# Patient Record
Sex: FEMALE | Race: BLACK OR AFRICAN AMERICAN | Employment: FULL TIME | URBAN - METROPOLITAN AREA
[De-identification: names, ages, dates, MRNs, and addresses within clinical notes are randomized per-mention and may not be internally consistent; named-entity substitution may affect disease eponyms.]

---

## 2017-03-13 DIAGNOSIS — I10 ESSENTIAL HYPERTENSION, BENIGN: ICD-10-CM

## 2017-03-13 RX ORDER — LISINOPRIL AND HYDROCHLOROTHIAZIDE 20; 25 MG/1; MG/1
1 TABLET ORAL DAILY
Qty: 90 TAB | Refills: 0 | Status: SHIPPED | OUTPATIENT
Start: 2017-03-13 | End: 2017-09-19 | Stop reason: SDUPTHER

## 2017-09-19 DIAGNOSIS — I10 ESSENTIAL HYPERTENSION, BENIGN: ICD-10-CM

## 2017-09-19 RX ORDER — LISINOPRIL AND HYDROCHLOROTHIAZIDE 20; 25 MG/1; MG/1
1 TABLET ORAL DAILY
Qty: 30 TAB | Refills: 2 | Status: SHIPPED | OUTPATIENT
Start: 2017-09-19 | End: 2018-10-19

## 2017-10-15 ENCOUNTER — HOSPITAL ENCOUNTER (EMERGENCY)
Age: 32
Discharge: HOME | End: 2017-10-15
Attending: EMERGENCY MEDICINE | Admitting: EMERGENCY MEDICINE
Payer: SUBSIDIZED

## 2017-10-15 VITALS
OXYGEN SATURATION: 100 % | HEART RATE: 90 BPM | HEIGHT: 66 IN | SYSTOLIC BLOOD PRESSURE: 133 MMHG | WEIGHT: 200 LBS | RESPIRATION RATE: 18 BRPM | BODY MASS INDEX: 32.14 KG/M2 | TEMPERATURE: 100.9 F | DIASTOLIC BLOOD PRESSURE: 104 MMHG

## 2017-10-15 DIAGNOSIS — H65.193 OTHER ACUTE NONSUPPURATIVE OTITIS MEDIA OF BOTH EARS, RECURRENCE NOT SPECIFIED: Primary | ICD-10-CM

## 2017-10-15 DIAGNOSIS — H60.503 ACUTE OTITIS EXTERNA OF BOTH EARS, UNSPECIFIED TYPE: ICD-10-CM

## 2017-10-15 PROCEDURE — 74011250637 HC RX REV CODE- 250/637: Performed by: PHYSICIAN ASSISTANT

## 2017-10-15 PROCEDURE — 99283 EMERGENCY DEPT VISIT LOW MDM: CPT

## 2017-10-15 RX ORDER — LORATADINE 10 MG/1
10 TABLET ORAL
Status: COMPLETED | OUTPATIENT
Start: 2017-10-15 | End: 2017-10-15

## 2017-10-15 RX ORDER — IBUPROFEN 400 MG/1
800 TABLET ORAL
Status: COMPLETED | OUTPATIENT
Start: 2017-10-15 | End: 2017-10-15

## 2017-10-15 RX ORDER — LORATADINE 10 MG/1
10 TABLET ORAL DAILY
Qty: 20 TAB | Refills: 0 | Status: SHIPPED | OUTPATIENT
Start: 2017-10-15 | End: 2018-03-21

## 2017-10-15 RX ORDER — AMOXICILLIN 875 MG/1
875 TABLET, FILM COATED ORAL 2 TIMES DAILY
Qty: 20 TAB | Refills: 0 | Status: SHIPPED | OUTPATIENT
Start: 2017-10-15 | End: 2017-10-25

## 2017-10-15 RX ORDER — IBUPROFEN 800 MG/1
800 TABLET ORAL
Qty: 20 TAB | Refills: 0 | Status: SHIPPED | OUTPATIENT
Start: 2017-10-15 | End: 2017-10-15

## 2017-10-15 RX ORDER — NEOMYCIN SULFATE, POLYMYXIN B SULFATE, HYDROCORTISONE 3.5; 10000; 1 MG/ML; [USP'U]/ML; MG/ML
5 SOLUTION/ DROPS AURICULAR (OTIC) 4 TIMES DAILY
Qty: 10 ML | Refills: 0 | Status: SHIPPED | OUTPATIENT
Start: 2017-10-15 | End: 2017-10-15

## 2017-10-15 RX ORDER — IBUPROFEN 800 MG/1
800 TABLET ORAL
Qty: 20 TAB | Refills: 0 | Status: SHIPPED | OUTPATIENT
Start: 2017-10-15 | End: 2017-10-22

## 2017-10-15 RX ORDER — AMOXICILLIN 875 MG/1
875 TABLET, FILM COATED ORAL 2 TIMES DAILY
Qty: 20 TAB | Refills: 0 | Status: SHIPPED | OUTPATIENT
Start: 2017-10-15 | End: 2017-10-15

## 2017-10-15 RX ORDER — LORATADINE 10 MG/1
10 TABLET ORAL DAILY
Qty: 20 TAB | Refills: 0 | Status: SHIPPED | OUTPATIENT
Start: 2017-10-15 | End: 2017-10-15

## 2017-10-15 RX ORDER — NEOMYCIN SULFATE, POLYMYXIN B SULFATE, HYDROCORTISONE 3.5; 10000; 1 MG/ML; [USP'U]/ML; MG/ML
5 SOLUTION/ DROPS AURICULAR (OTIC) 4 TIMES DAILY
Qty: 10 ML | Refills: 0 | Status: SHIPPED | OUTPATIENT
Start: 2017-10-15 | End: 2018-03-21

## 2017-10-15 RX ADMIN — IBUPROFEN 800 MG: 400 TABLET, FILM COATED ORAL at 21:48

## 2017-10-15 RX ADMIN — LORATADINE 10 MG: 10 TABLET ORAL at 21:48

## 2017-10-16 NOTE — ED PROVIDER NOTES
Patient is a 36 y.o. female presenting with ear pain. The history is provided by the patient. Ear Pain    This is a new problem. The current episode started 12 to 24 hours ago. The problem occurs constantly. The problem has not changed since onset. Patient complains that both ears are affected. There has been a fever of 100 - 100.9 F. The fever has been present for less than 1 day. The pain is at a severity of 10/10. The pain is moderate. Associated symptoms include headaches. Pertinent negatives include no ear discharge, no hearing loss, no rhinorrhea, no sore throat, no abdominal pain, no diarrhea, no vomiting, no neck pain, no cough and no rash. Risk factors: [place]. Her past medical history does not include chronic ear infection, hearing loss or tympanostomy tube. Past Medical History:   Diagnosis Date    Headache     Hypertension        Past Surgical History:   Procedure Laterality Date    HX GYN      ectopic pregnancy - lapartomy         Family History:   Problem Relation Age of Onset    Hypertension Mother     Migraines Mother     Cancer Maternal Grandmother      brain tumor       Social History     Social History    Marital status: UNKNOWN     Spouse name: N/A    Number of children: N/A    Years of education: N/A     Occupational History    Not on file. Social History Main Topics    Smoking status: Current Every Day Smoker     Packs/day: 0.25    Smokeless tobacco: Never Used    Alcohol use Yes      Comment: occassionally    Drug use: No    Sexual activity: Yes     Partners: Male     Birth control/ protection: Pill     Other Topics Concern    Not on file     Social History Narrative         ALLERGIES: Nuts [tree nut]    Review of Systems   Constitutional: Negative for activity change, chills, fatigue and fever. HENT: Positive for ear pain.  Negative for congestion, dental problem, ear discharge, facial swelling, hearing loss, mouth sores, postnasal drip, rhinorrhea, sinus pressure, sore throat, tinnitus and trouble swallowing. Eyes: Negative for pain, discharge and visual disturbance. Respiratory: Negative for apnea, cough and shortness of breath. Cardiovascular: Negative for chest pain. Gastrointestinal: Negative for abdominal pain, diarrhea, nausea and vomiting. Genitourinary: Negative. Negative for dysuria and frequency. Musculoskeletal: Negative for arthralgias and neck pain. Skin: Negative. Negative for rash. Neurological: Positive for headaches. Negative for seizures, facial asymmetry, speech difficulty, weakness, light-headedness and numbness. Psychiatric/Behavioral: Negative. Vitals:    10/15/17 2122   BP: (!) 133/104   Pulse: 90   Resp: 18   Temp: (!) 100.9 °F (38.3 °C)   SpO2: 100%   Weight: 90.7 kg (200 lb)   Height: 5' 6\" (1.676 m)            Physical Exam   Constitutional: She is oriented to person, place, and time. She appears well-developed and well-nourished. No distress. HENT:   Head: Normocephalic and atraumatic. Right Ear: Hearing normal. There is swelling and tenderness. No drainage. No foreign bodies. No mastoid tenderness. Tympanic membrane is injected, erythematous and bulging. No middle ear effusion. No decreased hearing is noted. Left Ear: Hearing normal. There is swelling and tenderness. No drainage. No foreign bodies. No mastoid tenderness. Tympanic membrane is injected, erythematous and bulging. No middle ear effusion. No decreased hearing is noted. Nose: Nose normal. No mucosal edema or rhinorrhea. Right sinus exhibits no maxillary sinus tenderness and no frontal sinus tenderness. Left sinus exhibits no maxillary sinus tenderness and no frontal sinus tenderness. Mouth/Throat: Uvula is midline, oropharynx is clear and moist and mucous membranes are normal. No oropharyngeal exudate, posterior oropharyngeal edema, posterior oropharyngeal erythema or tonsillar abscesses.    Eyes: Conjunctivae and EOM are normal. Pupils are equal, round, and reactive to light. Neck: Normal range of motion. Neck supple. Cardiovascular: Normal rate, regular rhythm, normal heart sounds and intact distal pulses. Pulmonary/Chest: Effort normal and breath sounds normal.   Abdominal: Soft. Bowel sounds are normal.   Musculoskeletal: Normal range of motion. Neurological: She is alert and oriented to person, place, and time. No cranial nerve deficit. Skin: Skin is warm and dry. She is not diaphoretic. Psychiatric: She has a normal mood and affect. Her behavior is normal. Judgment and thought content normal.   Nursing note and vitals reviewed. MDM  Number of Diagnoses or Management Options  Acute otitis externa of both ears, unspecified type: Other acute nonsuppurative otitis media of both ears, recurrence not specified:   Diagnosis management comments: DDx: oe, aom, serous otitis media, uri, pharyngitis, mastoiditis    LABORATORY TESTS:  No results found for this or any previous visit (from the past 12 hour(s)). IMAGING RESULTS:  No orders to display    MEDICATIONS GIVEN:  Medications  ibuprofen (MOTRIN) tablet 800 mg (not administered)  loratadine (CLARITIN) tablet 10 mg (not administered)    IMPRESSION:  Other acute nonsuppurative otitis media of both ears, recurrence not specified  (primary encounter diagnosis)  Acute otitis externa of both ears, unspecified type    PLAN:  1. Current Discharge Medication List    START taking these medications    loratadine (CLARITIN) 10 mg tablet  Take 1 Tab by mouth daily. Qty: 20 Tab Refills: 0    ibuprofen (MOTRIN) 800 mg tablet  Take 1 Tab by mouth every six (6) hours as needed for Pain for up to 7 days. Qty: 20 Tab Refills: 0    amoxicillin (AMOXIL) 875 mg tablet  Take 1 Tab by mouth two (2) times a day for 10 days. Qty: 20 Tab Refills: 0    neomycin-polymyxin-hydrocortisone (CORTISPORIN) otic solution  Administer 5 Drops into each ear four (4) times daily.   Qty: 10 mL Refills: 0      CONTINUE these medications which have NOT CHANGED    lisinopril-hydroCHLOROthiazide (PRINZIDE, ZESTORETIC) 20-25 mg per tablet  Take 1 Tab by mouth daily. Qty: 30 Tab Refills: 2  Associated Diagnoses:Essential hypertension, benign    ascorbic acid (VITAMIN C) 500 mg tablet  Take  by mouth. cholecalciferol (VITAMIN D3) 1,000 unit cap  Take  by mouth daily. 2. Follow-up Information     Follow up With Details Comments [address], MD Schedule an appointment as soon as possible for a   visit in 1 week As needed, If symptoms worsen [address] [place] [address]   [address] [place] [address]  [phone]        Return to ED if worse                  Amount and/or Complexity of Data Reviewed  Tests in the medicine section of CPT®: ordered and reviewed    Patient Progress  Patient progress: stable    ED Course       Procedures    9:38 PM  Pt denies taking her daily HTN medication today. Educated on taking as Rx.     9:39 PM  I have discussed with patient their diagnosis, treatment, and follow up plan. The patient agrees to follow up as outlined in discharge paperwork and also to return to the ED with any worsening.  Dominga Barnes PA-C

## 2017-10-16 NOTE — ED NOTES
Discharge instructions were given to the patient by John Paul Lake RN. The patient left the Emergency Department ambulatory, alert and oriented and in no acute distress with 4 prescriptions. The patient was encouraged to call or return to the ED for worsening issues or problems and was encouraged to schedule a follow up appointment for continuing care. The patient verbalized understanding of discharge instructions and prescriptions, all questions were answered. The patient has no further concerns at this time.

## 2017-10-16 NOTE — ED NOTES
Pt presents ambulatory to ED complaining of ear pain and fever X1 day. Pt reports taking Tylenol at 1900 with no relief Pt is alert and oriented x 4, RR even and unlabored, skin is warm and dry. Assesment completed and pt updated on plan of care. Emergency Department Nursing Plan of Care       The Nursing Plan of Care is developed from the Nursing assessment and Emergency Department Attending provider initial evaluation. The plan of care may be reviewed in the ED Provider note.     The Plan of Care was developed with the following considerations:   Patient / Family readiness to learn indicated by:verbalized understanding  Persons(s) to be included in education: patient  Barriers to Learning/Limitations:No    Signed     Yris Infante RN    10/15/2017   9:52 PM

## 2017-10-16 NOTE — DISCHARGE INSTRUCTIONS
Swimmer's Ear: Care Instructions  Your Care Instructions    Swimmer's ear (otitis externa) is inflammation or infection of the ear canal. This is the passage that leads from the outer ear to the eardrum. Any water, sand, or other debris that gets into the ear canal and stays there can cause swimmer's ear. Putting cotton swabs or other items in the ear to clean it can also cause this problem. Swimmer's ear can be very painful. But you can treat the pain and infection with medicines. You should feel better in a few days. Follow-up care is a key part of your treatment and safety. Be sure to make and go to all appointments, and call your doctor if you are having problems. It's also a good idea to know your test results and keep a list of the medicines you take. How can you care for yourself at home? Cleaning and care  · Use antibiotic drops as your doctor directs. · Do not insert ear drops (other than the antibiotic ear drops) or anything else into the ear unless your doctor has told you to. · Avoid getting water in the ear until the problem clears up. Use cotton lightly coated with petroleum jelly as an earplug. Do not use plastic earplugs. · Use a hair dryer set on low to carefully dry the ear after you shower. · To ease ear pain, hold a warm washcloth against your ear. · Take pain medicines exactly as directed. ¨ If the doctor gave you a prescription medicine for pain, take it as prescribed. ¨ If you are not taking a prescription pain medicine, ask your doctor if you can take an over-the-counter medicine. Inserting ear drops  · Warm the drops to body temperature by rolling the container in your hands. Or you can place it in a cup of warm water for a few minutes. · Lie down, with your ear facing up. · Place drops inside the ear. Follow your doctor's instructions (or the directions on the label) for how many drops to use.  Gently wiggle the outer ear or pull the ear up and back to help the drops get into the ear. · It's important to keep the liquid in the ear canal for 3 to 5 minutes. When should you call for help? Call your doctor now or seek immediate medical care if:  · You have a new or higher fever. · You have new or worse pain, swelling, warmth, or redness around or behind your ear. · You have new or increasing pus or blood draining from your ear. Watch closely for changes in your health, and be sure to contact your doctor if:  · You are not getting better after 2 days (48 hours). Where can you learn more? Go to http://osmin-juancarlos.info/. Enter C706 in the search box to learn more about \"Swimmer's Ear: Care Instructions. \"  Current as of: July 29, 2016  Content Version: 11.3  © 8945-5236 [employer]. Care instructions adapted under license by [employer] (which disclaims liability or warranty for this information). If you have questions about a medical condition or this instruction, always ask your healthcare professional. [place] [address] any warranty or liability for your use of this information. Ear Infection (Otitis Media): Care Instructions  Your Care Instructions    An ear infection may start with a cold and affect the middle ear (otitis media). It can hurt a lot. Most ear infections clear up on their own in a couple of days. Most often you will not need antibiotics. This is because many ear infections are caused by a virus. Antibiotics don't work against a virus. Regular doses of pain medicines are the best way to reduce your fever and help you feel better. Follow-up care is a key part of your treatment and safety. Be sure to make and go to all appointments, and call your doctor if you are having problems. It's also a good idea to know your test results and keep a list of the medicines you take. How can you care for yourself at home? · Take pain medicines exactly as directed.   ¨ If the doctor gave you a prescription medicine for pain, take it as prescribed. ¨ If you are not taking a prescription pain medicine, take an over-the-counter medicine, such as acetaminophen (Tylenol), ibuprofen (Advil, Motrin), or naproxen (Aleve). Read and follow all instructions on the label. ¨ Do not take two or more pain medicines at the same time unless the doctor told you to. Many pain medicines have acetaminophen, which is Tylenol. Too much acetaminophen (Tylenol) can be harmful. · Plan to take a full dose of pain reliever before bedtime. Getting enough sleep will help you get better. · Try a warm, moist washcloth on the ear. It may help relieve pain. · If your doctor prescribed antibiotics, take them as directed. Do not stop taking them just because you feel better. You need to take the full course of antibiotics. When should you call for help? Call your doctor now or seek immediate medical care if:  · You have new or increasing ear pain. · You have new or increasing pus or blood draining from your ear. · You have a fever with a stiff neck or a severe headache. Watch closely for changes in your health, and be sure to contact your doctor if:  · You have new or worse symptoms. · You are not getting better after taking an antibiotic for 2 days. Where can you learn more? Go to http://osmin-juancarlos.info/. Enter X885 in the search box to learn more about \"Ear Infection (Otitis Media): Care Instructions. \"  Current as of: May 4, 2017  Content Version: 11.3  © 5905-5012 [employer]. Care instructions adapted under license by [employer] (which disclaims liability or warranty for this information). If you have questions about a medical condition or this instruction, always ask your healthcare professional. [address] any warranty or liability for your use of this information.

## 2018-02-12 ENCOUNTER — TELEPHONE (OUTPATIENT)
Dept: OBGYN CLINIC | Age: 33
End: 2018-02-12

## 2018-02-12 ENCOUNTER — OFFICE VISIT (OUTPATIENT)
Dept: OBGYN CLINIC | Age: 33
End: 2018-02-12

## 2018-02-12 VITALS
SYSTOLIC BLOOD PRESSURE: 119 MMHG | DIASTOLIC BLOOD PRESSURE: 82 MMHG | BODY MASS INDEX: 38.35 KG/M2 | HEART RATE: 99 BPM | TEMPERATURE: 98.6 F | RESPIRATION RATE: 18 BRPM | WEIGHT: 237.6 LBS

## 2018-02-12 DIAGNOSIS — Z01.411 ENCOUNTER FOR GYNECOLOGICAL EXAMINATION WITH ABNORMAL FINDING: ICD-10-CM

## 2018-02-12 DIAGNOSIS — N92.0 MENORRHAGIA WITH REGULAR CYCLE: Primary | ICD-10-CM

## 2018-02-12 RX ORDER — LEVONORGESTREL / ETHINYL ESTRADIOL AND ETHINYL ESTRADIOL 150-30(84)
1 KIT ORAL DAILY
Qty: 1 DOSE PACK | Refills: 4 | Status: SHIPPED | OUTPATIENT
Start: 2018-02-12 | End: 2018-10-19

## 2018-02-12 NOTE — MR AVS SNAPSHOT
[facility] 
 
 
 [place] [address] 
[phone] Patient: Jim Batres MRN: [id number] :1985 Visit Information Date & Time Provider Department Dept. Phone Encounter #  
 2018  2:00 PM Tc Cee MD [address] O[facility]YN AT [facility] [id number] Follow-up Instructions Return in about 1 year (around 2019), or if symptoms worsen or fail to improve. Follow-up and Disposition History Your Appointments 2018  3:00 PM  
New Patient with Nile Newton. Negra Emanuel NP  
[facility]) Appt Note: New pt est pcp. [place] [address] [place] [address] [id number]  
[phone]  
  
   
 [facility] [address] Upcoming Health Maintenance Date Due Influenza Age 5 to Adult 2017 PAP AKA CERVICAL CYTOLOGY 3/17/2018 Allergies as of 2018  Review Complete On: 2018 By: Tc Cee MD  
  
 Severity Noted Reaction Type Reactions Nuts [Tree Nut]  2014    Shortness of Breath Current Immunizations  Never Reviewed No immunizations on file. Not reviewed this visit You Were Diagnosed With   
  
 Codes Comments Menorrhagia with regular cycle    -  Primary ICD-10-CM: N92.0 ICD-9-CM: 626.2 Encounter for gynecological examination with abnormal finding     ICD-10-CM: Z01.411 ICD-9-CM: V72.31 Vitals BP Pulse Temp Resp Weight(growth percentile) LMP  
 119/82 (BP 1 Location: Left arm, BP Patient Position: Sitting) 99 98.6 °F (37 °C) (Oral) 18 237 lb 9.6 oz (107.8 kg) 2018 (Exact Date) BMI OB Status Smoking Status 38.35 kg/m2 Having regular periods Current Every Day Smoker Vitals History BMI and BSA Data Body Mass Index Body Surface Area  
 38.35 kg/m 2 2.24 m 2 Preferred Pharmacy Pharmacy Name Phone [facility] [address] [place] [address] [phone] Your Updated Medication List  
  
   
This list is accurate as of: 2/12/18  2:31 PM.  Always use your most recent med list.  
  
  
  
  
 L-Norgest&E Estradiol-E Estrad 0.15 mg-30 mcg (84)/10 mcg (7) 3mpk Commonly known as:  Phylliss Presume Take 1 Tab by mouth daily. Indications: MENORRHAGIA  
  
 lisinopril-hydroCHLOROthiazide 20-25 mg per tablet Commonly known as:  Daysi Primus Take 1 Tab by mouth daily. loratadine 10 mg tablet Commonly known as:  Lajune [place] Take 1 Tab by mouth daily. neomycin-polymyxin-hydrocortisone otic solution Commonly known as:  CORTISPORIN Administer 5 Drops into each ear four (4) times daily. POTASSIMIN PO Take  by mouth. VITAMIN B COMPLEX PO Take  by mouth. Prescriptions Sent to Pharmacy Refills L-Norgest&E Estradiol-E Estrad (SEASONIQUE) 0.15 mg-30 mcg (84)/10 mcg (7) 3MPk 4 Sig: Take 1 Tab by mouth daily. Indications: MENORRHAGIA Class: Normal  
 Pharmacy: [address] [place] [address] #: [phone] Route: Oral  
  
Follow-up Instructions Return in about 1 year (around 2/12/2019), or if symptoms worsen or fail to improve. Patient Instructions Pelvic Exam: Care Instructions Your Care Instructions When your doctor examines all of your pelvic organs, it's called a pelvic exam. Two good reasons to have this kind of exam are to check for sexually transmitted infections (STIs) and to get a Pap test. A Pap test is also called a Pap smear. It checks for early changes that can lead to cancer of the cervix. Sometimes a pelvic exam is part of a regular checkup. In this case, you can do some things to make your test results as accurate as possible. · Try to schedule the exam when you don't have your period. · Don't use douches, tampons, or vaginal medicines, sprays, or powders for 24 hours before your exam. 
· Don't have sex for 24 hours before your exam. 
Other times, women have this kind of exam at any time of the month. This is because they have pelvic pain, bleeding, or discharge. Or they may have another pelvic problem. Before your exam, it's important to share some information with your doctor. For example, if you are a survivor of rape or sexual abuse, you can talk about any concerns you may have. Your doctor will also want to know if you are pregnant or use birth control. And he or she will want to hear about any problems, surgeries, or procedures you have had in your pelvic area. You will also need to tell your doctor when your last period was. Follow-up care is a key part of your treatment and safety. Be sure to make and go to all appointments, and call your doctor if you are having problems. It's also a good idea to know your test results and keep a list of the medicines you take. How is a pelvic exam done? · During a pelvic exam, you will: ¨ Take off your clothes below the waist. You will get a paper or cloth cover to put over the lower half of your body. Clement Ayanna on your back on an exam table. Your feet will be raised above you. Stirrups will support your feet. · The doctor will: ¨ Ask you to relax your knees. Your knees need to lean out, toward the walls. ¨ Check the opening of your vagina for sores or swelling. ¨ Gently put a tool called a speculum into your vagina. It opens the vagina a little bit. You will feel some pressure. But if you are relaxed, it will not hurt. It lets your doctor see inside the vagina. ¨ Use a small brush, spatula, or swab to get a sample of cells, if you are having a Pap test or culture. The doctor then removes the speculum. ¨ Put on gloves and put one or two fingers of one hand into your vagina. The other hand goes on your lower belly.  This lets your doctor feel your pelvic organs. You will probably feel some pressure. Try to stay relaxed. ¨ Put one gloved finger into your rectum and one into your vagina, if needed. This can also help check your pelvic organs. This exam takes about 10 minutes. At the end, you will get a washcloth or tissue to clean your vaginal area. It's normal to have some discharge after this exam. You can then get dressed. Some test results may be ready right away. But results from a culture or a Pap test may take several days or a few weeks. Why should you have a pelvic exam? 
· You want to have recommended screening tests. This includes a Pap test. 
· You think you have a vaginal infection. Signs include itching, burning, or unusual discharge. · You might have been exposed to a sexually transmitted infection (STI), such as chlamydia or herpes. · You have vaginal bleeding that is not part of your normal menstrual period. · You have pain in your belly or pelvis. · You have been sexually assaulted. A pelvic exam lets your doctor collect evidence and check for STIs. · You are pregnant. · You are having trouble getting pregnant. What are the risks of a pelvic exam? 
There are no risks from a pelvic exam. 
When should you call for help? Watch closely for changes in your health, and be sure to contact your doctor if you have any problems. Where can you learn more? Go to http://osmin-juancarlos.info/. Enter R984 in the search box to learn more about \"Pelvic Exam: Care Instructions. \" Current as of: October 13, 2016 Content Version: 11.4 © 0430-9856 [employer]IMIZERx. Care instructions adapted under license by Taggify (which disclaims liability or warranty for this information). If you have questions about a medical condition or this instruction, always ask your healthcare professional. [address] any warranty or liability for your use of this information. Introducing [facility]! Dear Precious Peace: Thank you for requesting a Employee Benefit Plans account. Our records indicate that you already have an active Employee Benefit Plans account. You can access your account anytime at [email or web address] Did you know that you can access your hospital and ER discharge instructions at any time in Employee Benefit Plans? You can also review all of your test results from your hospital stay or ER visit. Additional Information If you have questions, please visit the Frequently Asked Questions section of the Employee Benefit Plans website at [email or web address] Remember, Employee Benefit Plans is NOT to be used for urgent needs. For medical emergencies, dial 911. Now available from your iPhone and Android! Please provide this summary of care documentation to your next provider. Your primary care clinician is listed as Mona Gloria. If you have any questions after today's visit, please call [phone].

## 2018-02-12 NOTE — PATIENT INSTRUCTIONS
Pelvic Exam: Care Instructions  Your Care Instructions    When your doctor examines all of your pelvic organs, it's called a pelvic exam. Two good reasons to have this kind of exam are to check for sexually transmitted infections (STIs) and to get a Pap test. A Pap test is also called a Pap smear. It checks for early changes that can lead to cancer of the cervix. Sometimes a pelvic exam is part of a regular checkup. In this case, you can do some things to make your test results as accurate as possible. · Try to schedule the exam when you don't have your period. · Don't use douches, tampons, or vaginal medicines, sprays, or powders for 24 hours before your exam.  · Don't have sex for 24 hours before your exam.  Other times, women have this kind of exam at any time of the month. This is because they have pelvic pain, bleeding, or discharge. Or they may have another pelvic problem. Before your exam, it's important to share some information with your doctor. For example, if you are a survivor of rape or sexual abuse, you can talk about any concerns you may have. Your doctor will also want to know if you are pregnant or use birth control. And he or she will want to hear about any problems, surgeries, or procedures you have had in your pelvic area. You will also need to tell your doctor when your last period was. Follow-up care is a key part of your treatment and safety. Be sure to make and go to all appointments, and call your doctor if you are having problems. It's also a good idea to know your test results and keep a list of the medicines you take. How is a pelvic exam done? · During a pelvic exam, you will:  ¨ Take off your clothes below the waist. You will get a paper or cloth cover to put over the lower half of your body. [place] on your back on an exam table. Your feet will be raised above you. Stirrups will support your feet. · The doctor will:  Simmie Raveling you to relax your knees.  Your knees need to lean out, toward the walls. ¨ Check the opening of your vagina for sores or swelling. ¨ Gently put a tool called a speculum into your vagina. It opens the vagina a little bit. You will feel some pressure. But if you are relaxed, it will not hurt. It lets your doctor see inside the vagina. ¨ Use a small brush, spatula, or swab to get a sample of cells, if you are having a Pap test or culture. The doctor then removes the speculum. ¨ Put on gloves and put one or two fingers of one hand into your vagina. The other hand goes on your lower belly. This lets your doctor feel your pelvic organs. You will probably feel some pressure. Try to stay relaxed. ¨ Put one gloved finger into your rectum and one into your vagina, if needed. This can also help check your pelvic organs. This exam takes about 10 minutes. At the end, you will get a washcloth or tissue to clean your vaginal area. It's normal to have some discharge after this exam. You can then get dressed. Some test results may be ready right away. But results from a culture or a Pap test may take several days or a few weeks. Why should you have a pelvic exam?  · You want to have recommended screening tests. This includes a Pap test.  · You think you have a vaginal infection. Signs include itching, burning, or unusual discharge. · You might have been exposed to a sexually transmitted infection (STI), such as chlamydia or herpes. · You have vaginal bleeding that is not part of your normal menstrual period. · You have pain in your belly or pelvis. · You have been sexually assaulted. A pelvic exam lets your doctor collect evidence and check for STIs. · You are pregnant. · You are having trouble getting pregnant. What are the risks of a pelvic exam?  There are no risks from a pelvic exam.  When should you call for help? Watch closely for changes in your health, and be sure to contact your doctor if you have any problems. Where can you learn more?   Go to http://osmin-juancarlos.info/. Enter A108 in the search box to learn more about \"Pelvic Exam: Care Instructions. \"  Current as of: October 13, 2016  Content Version: 11.4  © 2854-1879 [employer], [employer]. Care instructions adapted under license by [employer] (which disclaims liability or warranty for this information). If you have questions about a medical condition or this instruction, always ask your healthcare professional. [place] [address] any warranty or liability for your use of this information.

## 2018-02-12 NOTE — PROGRESS NOTES
Amber Herrera is a No obstetric history on file. ,  29 y.o. female [address] whose Patient's last menstrual period was 01/28/2018 (exact date). was on 1/28/2018 who presents for her annual checkup. She is having menorrhagia. Menstrual status:    Periods are regular around the same time each month. Last about 5 days, the first 2-3 of which are extremely heavy and interfering with daily life. Also with some dysmenorrhea. No bleeding between menses. She reports no premenstrual symptoms. Contraception:    The current method of family planning is none and She declines contraception and counseling. Sexual history:    She  reports that she currently engages in sexual activity and has had male partners. She reports using the following method of birth control/protection: None. Medical conditions:    Since her last annual GYN exam about one year ago, she has not the following changes in her health history: none. Pap and Mammogram History:    Her most recent Pap smear wasnormal obtained 1 year(s) ago. No hx of abnormal pap smear. The patient has never had a mammogram.    The patient does not have a family history of breast cancer. Past Medical History:   Diagnosis Date    Headache     Hypertension      Past Surgical History:   Procedure Laterality Date    HX GYN      ectopic pregnancy - lapartomy       Current Outpatient Prescriptions   Medication Sig Dispense Refill    lisinopril-hydroCHLOROthiazide (PRINZIDE, ZESTORETIC) 20-25 mg per tablet Take 1 Tab by mouth daily. 30 Tab 2    POTASSIUM (POTASSIMIN PO) Take  by mouth.  VITAMIN B COMPLEX PO Take  by mouth.  loratadine (CLARITIN) 10 mg tablet Take 1 Tab by mouth daily. 20 Tab 0    neomycin-polymyxin-hydrocortisone (CORTISPORIN) otic solution Administer 5 Drops into each ear four (4) times daily.  10 mL 0     Allergies: Nuts [employer] nut]   Social History     Social History    Marital status: SINGLE     Spouse name: N/A    Number of children: N/A    Years of education: N/A     Occupational History    Not on file. Social History Main Topics    Smoking status: Current Every Day Smoker     Packs/day: 0.25    Smokeless tobacco: Never Used      Comment: 2-3 cigarettes/day    Alcohol use Yes      Comment: occassionally    Drug use: No    Sexual activity: Yes     Partners: Male     Birth control/ protection: None     Other Topics Concern    Not on file     Social History Narrative     Tobacco History:  reports that she has been smoking. She has been smoking about 0.25 packs per day. She has never used smokeless tobacco.  Alcohol Abuse:  reports that she drinks alcohol. Drug Abuse:  reports that she does not use illicit drugs.     Patient Active Problem List   Diagnosis Code    Migraine G43.909    Essential hypertension I10    Tobacco abuse Z72.0    Obesity E66.9       Review of Systems - History obtained from the patient  Constitutional: negative for weight loss, fever, night sweats  HEENT: negative for hearing loss, earache, congestion, snoring, sorethroat  CV: negative for chest pain, palpitations, edema  Resp: negative for cough, shortness of breath, wheezing  GI: negative for change in bowel habits, abdominal pain, black or bloody stools  : negative for frequency, dysuria, hematuria, vaginal discharge  MSK: negative for back pain, joint pain, muscle pain  Breast: negative for breast lumps, nipple discharge, galactorrhea  Skin :negative for itching, rash, hives  Neuro: negative for dizziness, headache, confusion, weakness  Psych: negative for anxiety, depression, change in mood  Heme/lymph: negative for bleeding, bruising, pallor    Physical Exam    Visit Vitals    /82 (BP 1 Location: Left arm, BP Patient Position: Sitting)    Pulse 99    Temp 98.6 °F (37 °C) (Oral)    Resp 18    Wt 237 lb 9.6 oz (107.8 kg)    LMP 01/28/2018 (Exact Date)    BMI 38.35 kg/m2       Constitutional  · Appearance: well-nourished, well developed, alert, in no acute distress    HENT  · Head and Face: appears normal    Neck  · Inspection/Palpation: normal appearance, no masses or tenderness  · Lymph Nodes: no lymphadenopathy present  · Thyroid: gland size normal, nontender, no nodules or masses present on palpation    Chest  · Respiratory Effort: breathing normal  · Auscultation: normal breath sounds    Cardiovascular  · Heart:  · Auscultation: regular rate and rhythm without murmur    Breasts  · Inspection of Breasts: breasts symmetrical, no skin changes, no discharge present, nipple appearance normal, no skin retraction present  · Palpation of Breasts and Axillae: no masses present on palpation, no breast tenderness  · Axillary Lymph Nodes: no lymphadenopathy present    Gastrointestinal  · Abdominal Examination: abdomen non-tender to palpation, normal bowel sounds, no masses present  · Liver and spleen: no hepatomegaly present, spleen not palpable  · Hernias: no hernias identified    Genitourinary  · External Genitalia: normal appearance for age, no discharge present, no tenderness present, no inflammatory lesions present, no masses present, no atrophy present  · Vagina: normal vaginal vault without central or paravaginal defects, no discharge present, no inflammatory lesions present, no masses present  · Bladder: non-tender to palpation  · Urethra: appears normal  · Cervix: normal   · Uterus: normal size, shape and consistency  · Adnexa: no adnexal tenderness present, no adnexal masses present  · Perineum: perineum within normal limits, no evidence of trauma, no rashes or skin lesions present  · Anus: anus within normal limits, no hemorrhoids present  · Inguinal Lymph Nodes: no lymphadenopathy present    Skin  · General Inspection: no rash, no lesions identified    Neurologic/Psychiatric  · Mental Status:  · Orientation: grossly oriented to person, place and time  · Mood and Affect: mood normal, affect appropriate    . Assessment:  Routine gynecologic examination  Patient with menorrhagia and dysmenorrhea    Plan:  - pap smear up to date  - mammogram not indicated  - will start on seasonique for menstrual control    Patient reports that she is quitting smoking.   Counseled re: diet, exercise, healthy lifestyle  Return for yearly wellness visits  Pt counseled regarding co-testing for high risk HPV with pap

## 2018-02-12 NOTE — PROGRESS NOTES
Chief Complaint   Patient presents with    Well Woman     Patient presents in stable condition, complains of heavy menstrual cycles, which are regular. Patient requests oral contraceptives.

## 2018-02-13 RX ORDER — NORGESTIMATE AND ETHINYL ESTRADIOL 0.25-0.035
1 KIT ORAL DAILY
Qty: 4 DOSE PACK | Refills: 5 | Status: SHIPPED | OUTPATIENT
Start: 2018-02-13 | End: 2018-05-15 | Stop reason: SDUPTHER

## 2018-02-13 NOTE — TELEPHONE ENCOUNTER
Called in a prescription for orthocyclin for patient, as seasonique not covered. Will cycle q 3months (skip placebos for 3 packs, then take all of 4th pack). Instructions left on message. Patient to return call if any questions.

## 2018-03-15 ENCOUNTER — TELEPHONE (OUTPATIENT)
Dept: OBGYN CLINIC | Age: 33
End: 2018-03-15

## 2018-03-15 NOTE — TELEPHONE ENCOUNTER
Pt called office stating she finished her first pack of BCP. Then she was to skip the placebo pills. Pt stated when she went to  the new pack of BCP the pharmacy wouldn't fill the Rx because they said it was too soon (pt stated she still has 7 pills left to take). Pt then stated she eventually started the second pack of pills, but now she has been on her cycle for 12 days. Pt was told per Dr. Yulia Beckham that he will send in a medication that she is to take for 7 days in addition to her BCP, that will help with the bleeding. Understanding voiced by pt. Pharmacy information verified.

## 2018-03-20 ENCOUNTER — TELEPHONE (OUTPATIENT)
Dept: OBGYN CLINIC | Age: 33
End: 2018-03-20

## 2018-03-20 NOTE — TELEPHONE ENCOUNTER
Pt states she has been bleeding for 17 days, she is down to her last  Premarin pill.  Asking what to do

## 2018-03-21 ENCOUNTER — HOSPITAL ENCOUNTER (EMERGENCY)
Age: 33
Discharge: HOME | End: 2018-03-21
Attending: EMERGENCY MEDICINE
Payer: SUBSIDIZED

## 2018-03-21 VITALS
HEART RATE: 98 BPM | OXYGEN SATURATION: 96 % | HEIGHT: 66 IN | SYSTOLIC BLOOD PRESSURE: 157 MMHG | RESPIRATION RATE: 19 BRPM | TEMPERATURE: 98.2 F | WEIGHT: 237 LBS | DIASTOLIC BLOOD PRESSURE: 95 MMHG | BODY MASS INDEX: 38.09 KG/M2

## 2018-03-21 DIAGNOSIS — N92.0 MENORRHAGIA WITH REGULAR CYCLE: ICD-10-CM

## 2018-03-21 DIAGNOSIS — R31.9 URINARY TRACT INFECTION WITH HEMATURIA, SITE UNSPECIFIED: Primary | ICD-10-CM

## 2018-03-21 DIAGNOSIS — N39.0 URINARY TRACT INFECTION WITH HEMATURIA, SITE UNSPECIFIED: Primary | ICD-10-CM

## 2018-03-21 LAB
ANION GAP BLD CALC-SCNC: 18 MMOL/L (ref 10–20)
APPEARANCE UR: CLEAR
BACTERIA URNS QL MICRO: ABNORMAL /HPF
BILIRUB UR QL: NEGATIVE
BUN BLD-MCNC: <3 MG/DL (ref 9–20)
CA-I BLD-MCNC: 1.16 MMOL/L (ref 1.12–1.32)
CHLORIDE BLD-SCNC: 105 MMOL/L (ref 98–107)
CO2 BLD-SCNC: 25 MMOL/L (ref 21–32)
COLOR UR: ABNORMAL
CREAT BLD-MCNC: 0.5 MG/DL (ref 0.6–1.3)
EPITH CASTS URNS QL MICRO: ABNORMAL /LPF
GLUCOSE BLD-MCNC: 81 MG/DL (ref 65–100)
GLUCOSE UR STRIP.AUTO-MCNC: NEGATIVE MG/DL
HCG UR QL: NEGATIVE
HCT VFR BLD CALC: 39 % (ref 35–47)
HGB UR QL STRIP: ABNORMAL
KETONES UR QL STRIP.AUTO: NEGATIVE MG/DL
LEUKOCYTE ESTERASE UR QL STRIP.AUTO: NEGATIVE
MUCOUS THREADS URNS QL MICRO: ABNORMAL /LPF
NITRITE UR QL STRIP.AUTO: NEGATIVE
PH UR STRIP: 6.5 [PH] (ref 5–8)
POTASSIUM BLD-SCNC: 3.3 MMOL/L (ref 3.5–5.1)
PROT UR STRIP-MCNC: NEGATIVE MG/DL
RBC #/AREA URNS HPF: ABNORMAL /HPF (ref 0–5)
SERVICE CMNT-IMP: ABNORMAL
SODIUM BLD-SCNC: 143 MMOL/L (ref 136–145)
SP GR UR REFRACTOMETRY: 1.02 (ref 1–1.03)
UA: UC IF INDICATED,UAUC: ABNORMAL
UROBILINOGEN UR QL STRIP.AUTO: 1 EU/DL (ref 0.2–1)
WBC URNS QL MICRO: ABNORMAL /HPF (ref 0–4)

## 2018-03-21 PROCEDURE — 81025 URINE PREGNANCY TEST: CPT

## 2018-03-21 PROCEDURE — 81001 URINALYSIS AUTO W/SCOPE: CPT | Performed by: PHYSICIAN ASSISTANT

## 2018-03-21 PROCEDURE — 99284 EMERGENCY DEPT VISIT MOD MDM: CPT

## 2018-03-21 PROCEDURE — 87086 URINE CULTURE/COLONY COUNT: CPT | Performed by: PHYSICIAN ASSISTANT

## 2018-03-21 PROCEDURE — 80047 BASIC METABLC PNL IONIZED CA: CPT

## 2018-03-21 RX ORDER — CEPHALEXIN 500 MG/1
500 CAPSULE ORAL 2 TIMES DAILY
Qty: 14 CAP | Refills: 0 | Status: SHIPPED | OUTPATIENT
Start: 2018-03-21 | End: 2018-03-28

## 2018-03-21 RX ORDER — SODIUM CHLORIDE 0.9 % (FLUSH) 0.9 %
5-10 SYRINGE (ML) INJECTION EVERY 8 HOURS
Status: DISCONTINUED | OUTPATIENT
Start: 2018-03-21 | End: 2018-03-21 | Stop reason: HOSPADM

## 2018-03-21 RX ORDER — SODIUM CHLORIDE 0.9 % (FLUSH) 0.9 %
5-10 SYRINGE (ML) INJECTION AS NEEDED
Status: DISCONTINUED | OUTPATIENT
Start: 2018-03-21 | End: 2018-03-21 | Stop reason: HOSPADM

## 2018-03-21 NOTE — ED NOTES
Patient here with c/o vaginal bleeding. Patient states problem x17 days. Patient states recent changes in her birth control medications, states on Primarin switched to [place] (?). Patient states \"I'm going through 2 tampon per hour\". Patient states symptoms also include headaches and generalized weakness and achy breasts. Patient denies N/V/D. Patient states last regular cycle at the end of January, states then no cycle in February and then this cycle that started 17 days ago and ongoing. Emergency Department Nursing Plan of Care       The Nursing Plan of Care is developed from the Nursing assessment and Emergency Department Attending provider initial evaluation. The plan of care may be reviewed in the ED Provider note.     The Plan of Care was developed with the following considerations:   Patient / Family readiness to learn indicated by:verbalized understanding  Persons(s) to be included in education: patient  Barriers to Learning/Limitations:No    Signed     Sara Carroll RN    3/21/2018   5:33 PM

## 2018-03-21 NOTE — DISCHARGE INSTRUCTIONS
Heavy Menstrual Periods: Care Instructions  Your Care Instructions    Many women get heavy menstrual periods and painful cramps. For some women, this means passing large blood clots and changing sanitary pads or tampons often. You may also have periods that last longer than 7 days. A change in hormones or an irritation in the uterus can cause heavy bleeding. Women who are overweight are more likely to have heavy menstrual periods. But there may not be a specific cause for your heavy menstrual periods. Your doctor may recommend hormone treatments to slow or stop your periods. If a fibroid (a growth that is not cancer) is causing your heavy bleeding, your doctor may recommend surgery or other treatments to remove the growth. Because blood loss from heavy menstrual periods can make you very tired and weak (anemic), your doctor may recommend that you take extra iron. Follow-up care is a key part of your treatment and safety. Be sure to make and go to all appointments, and call your doctor if you are having problems. It's also a good idea to know your test results and keep a list of the medicines you take. How can you care for yourself at home? · Get plenty of rest.  · Keep a record of your periods. Write down when your period begins and ends and how much flow you have. That means counting the number of pads and tampons you use. Note whether they are soaked. Note any other symptoms. Take this record to your doctor appointments. · Take your medicines exactly as prescribed. Call your doctor if you think you are having a problem with your medicine. · Take pain medicines exactly as directed. ¨ If the doctor gave you a prescription medicine for pain, take it as prescribed. ¨ If you are not taking a prescription pain medicine, ask your doctor if you can take an over-the-counter medicine. · Try to reach a healthy weight. If you are trying to lose weight, do it slowly with your doctor's advice.   · If you are taking iron pills:  ¨ Try to take the pills about 1 hour before or 2 hours after meals. But you may need to take iron with some food to avoid an upset stomach. ¨ Vitamin C (from food or pills) helps your body absorb iron. Try taking iron pills with a glass of orange juice or other citrus fruit juice. ¨ Do not take antacids or drink milk or caffeine drinks (such as coffee, tea, or cola) at the same time or within 2 hours of the time that you take your iron. They can make it hard for your body to absorb the iron. ¨ Iron pills may cause stomach problems, such as heartburn, nausea, diarrhea, constipation, and cramps. Be sure to drink plenty of fluids, and include fruits, vegetables, and fiber in your diet each day. ¨ If you forget to take an iron pill, do not take a double dose of iron the next time you take a pill. ¨ Keep iron pills out of the reach of small children. An overdose of iron can be very dangerous. When should you call for help? Call 911 anytime you think you may need emergency care. For example, call if:  ? · You passed out (lost consciousness). ?Call your doctor now or seek immediate medical care if:  ? · You have new or worse belly or pelvic pain. ? · You have severe vaginal bleeding. ? · You feel dizzy or lightheaded, or you feel like you may faint. ? Watch closely for changes in your health, and be sure to contact your doctor if:  ? · You think you may be pregnant. ? · Your bleeding gets worse. ? · You do not get better as expected. Where can you learn more? Go to http://osmin-juancarlos.info/. Enter F477 in the search box to learn more about \"Heavy Menstrual Periods: Care Instructions. \"  Current as of: October 13, 2016  Content Version: 11.4  © 7389-8813 [employer]. Care instructions adapted under license by [employer] (which disclaims liability or warranty for this information).  If you have questions about a medical condition or this instruction, always ask your healthcare professional. [place] [address] any warranty or liability for your use of this information.

## 2018-03-21 NOTE — ED PROVIDER NOTES
EMERGENCY DEPARTMENT HISTORY AND PHYSICAL EXAM      Date: 3/21/2018  Patient Name: Lucy Boggs    History of Presenting Illness     Chief Complaint   Patient presents with    Vaginal Bleeding     heavy vaginal bleeding x 1 month after starting Sprintec, PCP prescribed Premarin without relief, using approx. 2 tampons within an hr, denies clots. History Provided By: Patient    HPI: Lucy Boggs, 29 y.o. female with PMHx significant for HTN, ectopic pregnancy, presents ambulatory to the ED with cc of 5/10 abdominal cramping, along with associated ongoing vaginal bleeding, and moderate headache x 17 days. Pt reports seeing her OBGYN sometime in February, and being put on birth control for the first time at the time. She adds her menstrual cycle starting ~17 days ago (prior LMP 1/28/18), and reports that she has been constantly bleeding since then while going through 2 tampons per hour; she denies there being any clots, and describes the blood as bright red. She called her OBGYN's office last week, and got prescribed Premarin, however it has not helped with her ongoing bleeding, and reports to the ED today due to being on her last Premarin dosage (called OBGYN and left message today but provider out of office). Of note, she is on Lisinopril daily for her HTN, and has a Hx of an ectopic pregnancy ~7-8yrs ago which resulted in her having a R salpingectomy. She denies any N/V/D, constipation, chest pain, SOB, and possibility for STD. Social Hx:   ETOH: +, occasional  Tobacco: +, 2.47YVR  Illicit drug use: no      PCP: Otilio Phalen, MD    There are no other complaints, changes, or physical findings at this time.     Current Facility-Administered Medications   Medication Dose Route Frequency Provider Last Rate Last Dose    sodium chloride (NS) flush 5-10 mL  5-10 mL IntraVENous Q8H Jb Ireland PA-C        sodium chloride (NS) flush 5-10 mL  5-10 mL IntraVENous PRN Jb Ireland PA-C Current Outpatient Prescriptions   Medication Sig Dispense Refill    cephALEXin (KEFLEX) 500 mg capsule Take 1 Cap by mouth two (2) times a day for 7 days. 14 Cap 0    norgestimate-ethinyl estradiol (ORTHO-CYCLEN, SPRINTEC) 0.25-35 mg-mcg tab Take 1 Tab by mouth daily. Skip placebos for 3 packs. 4 Dose Pack 5    VITAMIN B COMPLEX PO Take  by mouth.  lisinopril-hydroCHLOROthiazide (PRINZIDE, ZESTORETIC) 20-25 mg per tablet Take 1 Tab by mouth daily. 30 Tab 2    L-Norgest&E Estradiol-E Estrad (SEASONIQUE) 0.15 mg-30 mcg (84)/10 mcg (7) 3MPk Take 1 Tab by mouth daily. Indications: MENORRHAGIA 1 Dose Pack 4       Past History     Past Medical History:  Past Medical History:   Diagnosis Date    Headache     Hypertension        Past Surgical History:  Past Surgical History:   Procedure Laterality Date    HX GYN      ectopic pregnancy - lapartomy       Family History:  Family History   Problem Relation Age of Onset    Hypertension Mother    Everlyn Ebonie Migraines Mother     Cancer Maternal Grandmother      brain tumor       Social History:  Social History   Substance Use Topics    Smoking status: Current Every Day Smoker     Packs/day: 0.25    Smokeless tobacco: Never Used      Comment: 2-3 cigarettes/day    Alcohol use Yes      Comment: occassionally       Allergies: Allergies   Allergen Reactions    Nuts [Tree Nut] Shortness of Breath         Review of Systems   Review of Systems   Constitutional: Negative for chills and fever. HENT: Negative for congestion, rhinorrhea, sneezing and sore throat. Eyes: Negative for redness and visual disturbance. Respiratory: Negative for shortness of breath. Cardiovascular: Negative for chest pain and leg swelling. Gastrointestinal: Positive for abdominal pain (intermittent cramping; mild). Negative for constipation, diarrhea, nausea and vomiting. Genitourinary: Positive for menstrual problem and vaginal bleeding.  Negative for difficulty urinating, dysuria, frequency, genital sores, pelvic pain and vaginal discharge. Musculoskeletal: Negative for back pain, myalgias and neck stiffness. Skin: Negative for color change and rash. Neurological: Positive for headaches. Negative for dizziness, syncope and weakness. Hematological: Negative for adenopathy. All other systems reviewed and are negative. Physical Exam   Physical Exam   Constitutional: She is oriented to person, place, and time. She appears well-developed and well-nourished. Well appearing female in no distress. HENT:   Head: Normocephalic and atraumatic. Mouth/Throat: Oropharynx is clear and moist. Mucous membranes are not pale. Eyes: Conjunctivae and EOM are normal.   Neck: Normal range of motion and full passive range of motion without pain. Neck supple. Cardiovascular: Normal rate, regular rhythm, S1 normal, S2 normal, normal heart sounds, intact distal pulses and normal pulses. No murmur heard. Pulmonary/Chest: Effort normal and breath sounds normal. No respiratory distress. She has no wheezes. Abdominal: Soft. Normal appearance and bowel sounds are normal. She exhibits no distension. There is no tenderness. There is no rigidity, no rebound, no guarding, no tenderness at McBurney's point and negative Chapman's sign. Hernia confirmed negative in the right inguinal area and confirmed negative in the left inguinal area. Genitourinary: Vagina normal. Pelvic exam was performed with patient supine. No labial fusion. There is no rash, tenderness, lesion or injury on the right labia. There is no rash, tenderness, lesion or injury on the left labia. No erythema or tenderness in the vagina. No foreign body in the vagina. No signs of injury around the vagina. No vaginal discharge found. Genitourinary Comments: scant amount of bright red blood in vaginal canal. No active hemorrhage noted. Musculoskeletal: Normal range of motion.    Lymphadenopathy:        Right: No inguinal adenopathy present. Left: No inguinal adenopathy present. Neurological: She is alert and oriented to person, place, and time. She has normal strength. Skin: Skin is warm, dry and intact. No rash noted. No pallor. Psychiatric: She has a normal mood and affect. Her speech is normal and behavior is normal. Judgment and thought content normal.   Nursing note and vitals reviewed. Diagnostic Study Results     Labs -     Recent Results (from the past 12 hour(s))   HCG URINE, QL. - POC    Collection Time: 03/21/18  5:54 PM   Result Value Ref Range    Pregnancy test,urine (POC) NEGATIVE  NEG     URINALYSIS W/ REFLEX CULTURE    Collection Time: 03/21/18  6:05 PM   Result Value Ref Range    Color DARK YELLOW      Appearance CLEAR CLEAR      Specific gravity 1.025 1.003 - 1.030      pH (UA) 6.5 5.0 - 8.0      Protein NEGATIVE  NEG mg/dL    Glucose NEGATIVE  NEG mg/dL    Ketone NEGATIVE  NEG mg/dL    Bilirubin NEGATIVE  NEG      Blood MODERATE (A) NEG      Urobilinogen 1.0 0.2 - 1.0 EU/dL    Nitrites NEGATIVE  NEG      Leukocyte Esterase NEGATIVE  NEG      WBC 0-4 0 - 4 /hpf    RBC 0-5 0 - 5 /hpf    Epithelial cells FEW FEW /lpf    Bacteria 1+ (A) NEG /hpf    UA:UC IF INDICATED URINE CULTURE ORDERED (A) CNI      Mucus TRACE (A) NEG /lpf   POC CHEM8    Collection Time: 03/21/18  6:07 PM   Result Value Ref Range    Calcium, ionized (POC) 1.16 1.12 - 1.32 mmol/L    Sodium (POC) 143 136 - 145 mmol/L    Potassium (POC) 3.3 (L) 3.5 - 5.1 mmol/L    Chloride (POC) 105 98 - 107 mmol/L    CO2 (POC) 25 21 - 32 mmol/L    Anion gap (POC) 18 10 - 20 mmol/L    Glucose (POC) 81 65 - 100 mg/dL    BUN (POC) <3 (L) 9 - 20 mg/dL    Creatinine (POC) 0.5 (L) 0.6 - 1.3 mg/dL    GFRAA, POC >60 >60 ml/min/1.73m2    GFRNA, POC >60 >60 ml/min/1.73m2    Hematocrit (POC) 39 35.0 - 47.0 %    Comment Comment Not Indicated. Medical Decision Making   I am the first provider for this patient.     I reviewed the vital signs, available nursing notes, past medical history, past surgical history, family history and social history. Vital Signs-Reviewed the patient's vital signs. Patient Vitals for the past 12 hrs:   Temp Pulse Resp BP SpO2   03/21/18 1717 98.2 °F (36.8 °C) 98 19 (!) 157/95 96 %       Records Reviewed: Old Medical Records    Provider Notes (Medical Decision Making):     DDx: anemia, menorrhagia, dysmenorrhea, uterine fibroid, hormone misbalance. ED Course:   Initial assessment performed. The patients presenting problems have been discussed, and they are in agreement with the care plan formulated and outlined with them. I have encouraged them to ask questions as they arise throughout their visit. 5:27 PM  Old records reviewed: pt had an OB visit on 02/12 with cc of nausea at the time; she was then prescribed seasonique. Pt later spoke to her OB on 03/15, and yesterday due to being on her last pill of premarin. Written by Destiny Chaidez ED scribe, as dictated by Pricila Moran PA-C    Procedure Note - Pelvic Exam:    6:23 PM  Performed by: Pricila Moran PA-C  Chaperoned by: Elza Liang  Pelvic exam was performed using bimanual and speculum. Further findings noted in physical exam.   The procedure took 1-15 minutes, and pt tolerated well.    6:24 PM  Plan to have the pt follow up with OB due to recently being on Premarin, and physical exam being relatively normal. Labs are normal. Will send Abx for possible UTI. Written by Destiny Chaidez ED scribe, as dictated by Pricila Moran PA-C    Disposition:  DISCHARGE NOTE  6:34 PM  The patient has been re-evaluated and is ready for discharge. Reviewed available results with patient. Counseled pt on diagnosis and care plan. Pt has expressed understanding, and all questions have been answered. Pt agrees with plan and agrees to F/U as recommended, or return to the ED if their sxs worsen. Discharge instructions have been provided and explained to the pt, along with reasons to return to the ED. PLAN:  1. Discharge Medication List as of 3/21/2018  6:28 PM      START taking these medications    Details   cephALEXin (KEFLEX) 500 mg capsule Take 1 Cap by mouth two (2) times a day for 7 days. , Normal, Disp-14 Cap, R-0         CONTINUE these medications which have NOT CHANGED    Details   norgestimate-ethinyl estradiol (ORTHO-CYCLEN, SPRINTEC) 0.25-35 mg-mcg tab Take 1 Tab by mouth daily. Skip placebos for 3 packs., Normal, Disp-4 Dose Pack, R-5      VITAMIN B COMPLEX PO Take  by mouth., Historical Med      lisinopril-hydroCHLOROthiazide (PRINZIDE, ZESTORETIC) 20-25 mg per tablet Take 1 Tab by mouth daily. , Normal, Disp-30 Tab, R-2      L-Norgest&E Estradiol-E Estrad (SEASONIQUE) 0.15 mg-30 mcg (84)/10 mcg (7) 3MPk Take 1 Tab by mouth daily. Indications: MENORRHAGIA, Normal, Disp-1 Dose Pack, R-4           2. Follow-up Information     Follow up With Details Comments 98 Antonella Monteiro NP Schedule an appointment as soon as possible for a visit in 1 day As needed Abilio Valle MD Schedule an appointment as soon as possible for a visit in 1 day As needed [facility] [address]  [phone]          Return to ED if worse     Diagnosis     Clinical Impression:   1. Urinary tract infection with hematuria, site unspecified    2. Menorrhagia with regular cycle        Attestations: This note is prepared by Jeremy Pop, acting as Scribe for [employer]. The scribe's documentation has been prepared under my direction and personally reviewed by me in its entirety. I confirm that the note above accurately reflects all work, treatment, procedures, and medical decision making performed by me.   Jaren Nieto PA-C

## 2018-03-22 LAB
BACTERIA SPEC CULT: NORMAL
CC UR VC: NORMAL
SERVICE CMNT-IMP: NORMAL

## 2018-03-23 NOTE — TELEPHONE ENCOUNTER
Please have patient continue with birth control pills and cycle with this packet (take placebos).  If no improvement, will try a different pill with the next pack of pills    Left message for pt to return call to office.

## 2018-03-26 ENCOUNTER — OFFICE VISIT (OUTPATIENT)
Dept: OBGYN CLINIC | Age: 33
End: 2018-03-26

## 2018-03-26 VITALS
SYSTOLIC BLOOD PRESSURE: 138 MMHG | WEIGHT: 233.6 LBS | HEART RATE: 88 BPM | HEIGHT: 66 IN | TEMPERATURE: 97.7 F | RESPIRATION RATE: 18 BRPM | DIASTOLIC BLOOD PRESSURE: 90 MMHG | BODY MASS INDEX: 37.54 KG/M2

## 2018-03-26 DIAGNOSIS — N93.9 ABNORMAL UTERINE BLEEDING (AUB): Primary | ICD-10-CM

## 2018-03-26 NOTE — PATIENT INSTRUCTIONS
Ethinyl Estradiol/Norgestimate (By mouth)   Ethinyl Estradiol (ETH-i-nil es-tra-DYE-ol), Norgestimate (wya-DQH-ks-mate)  Prevents pregnancy and treats acne. This medicine is commonly called a birth control pill. Brand Name(s): Estarylla, Femynor, Mono-Linyah, MonoNessa, Ortho Tri-Cyclen, Ortho Tri-Cyclen Lo, Ortho-Cyclen, Previfem, Sprintec, Tri Femynor, Norphlet, Tri-Linyah, [place], Tri-Lo-Khadijah, Tri-Lo-Sprintec   There may be other brand names for this medicine. When This Medicine Should Not Be Used: This medicine is not right for everyone. Do not use it if you had an allergic reaction to ethinyl estradiol or norgestimate, or if you are pregnant or have unusual vaginal bleeding that has not been checked by your doctor. Do not use it if you have liver disease or liver cancer, breast cancer, or blood clotting problems. Do not use it if you have high blood pressure, certain heart problems, or diabetes with kidney, eye, nerve, or blood vessel damage. How to Use This Medicine:   Tablet  · Your doctor will tell you how much medicine to use. Do not use more than directed. · Each brand of birth control pills has specific directions. Read and follow the patient instructions for your prescribed brand. Talk to your doctor or pharmacist if you have any questions. · Ask your doctor if you should use a second form of birth control for the first 7 days of your first cycle of pills. · Take your pill at the same time every day. Birth control pills work best when there is no more than 24 hours between doses. Keep the pills in the original container. Take the pills in the order that they appear in the container. · Follow the instructions in the patient leaflet or call your doctor if you vomit or have diarrhea within 3 to 4 hours of taking this medicine. · Missed dose: Read and carefully follow the patient instructions if you miss a dose. You may need to use a second form of birth control for 1 week.  You could have light bleeding or spotting any time you do not take a pill on time. The more pills you miss, the more likely you are to have bleeding. · Store the medicine in a closed container at room temperature, away from heat, moisture, and direct light. Store the medicine in its original package. Drugs and Foods to Avoid:   Ask your doctor or pharmacist before using any other medicine, including over-the-counter medicines, vitamins, and herbal products. · Do not use this medicine together with medicine to treat hepatitis C virus infection, including ombitasvir/paritaprevir/ritonavir, with or without dasabuvir. · Some foods and medicines can affect how birth control pills work. Tell your doctor if you are also using the following:   ¨ Acetaminophen, aprepitant, ascorbic acid, aspirin, atorvastatin, bosentan, clofibrate, colesevelam, cyclosporine, morphine, prednisolone, rosuvastatin, Alex's wort, temazepam, theophylline, tizanidine  ¨ Medicine to treat an infection (including griseofulvin, fluconazole, itraconazole, ketoconazole, rifabutin, rifampicin, voriconazole  ¨ Medicine to treat HIV/AIDS (including amprenavir/ritonavir, atazanavir/ritonavir, boceprevir, darunavir/ritonavir, etravirine, fosamprenavir/ritonavir, indinavir, lopinavir/ritonavir, nelfinavir, nevirapine, ritonavir, telaprevir, tipranavir/ritonavir)  ¨ Medicine to treat seizures (including carbamazepine, felbamate, lamotrigine, oxcarbazepine, phenytoin, rufinamide, topiramate)  ¨ Thyroid replacement medicine  · Do not eat grapefruit or drink grapefruit juice while you are using this medicine. Warnings While Using This Medicine:   · Tell your doctor right away if you think you have become pregnant. If you miss 2 monthly periods in a row, call your doctor for a pregnancy test before you take any more pills. · Tell your doctor if you are breastfeeding, or if you had given birth within 4 weeks before you start using this medicine.  Tell your doctor if you have cervical cancer, diabetes, epilepsy, gallbladder disease, migraine headaches, heart or blood vessel disease, high cholesterol, a history of chloasma gravidarum (skin discoloration of the face during pregnancy), or depression, or a family history of breast cancer. Tell your doctor if you smoke or if you are having a surgery that requires inactivity for a long time. · This medicine may cause the following problems:  ¨ Increased risk of heart attack, stroke, or blood clots  ¨ Liver problems (including cancer or tumors)  ¨ High blood pressure  ¨ Gallbladder disease  ¨ High cholesterol or fats in the blood  ¨ Possible increased risk of breast or cervical cancer  · This medicine will not protect you from HIV/AIDS or other sexually transmitted diseases. · You might have spotting or irregular bleeding when you first start using this medicine. You might have unplanned bleeding if you miss a dose or are late taking it. However, if you have heavy bleeding, call your doctor. · If you miss two periods in a row, call your doctor for a pregnancy test before you take any more pills. · Tell any doctor or dentist who treats you that you are using this medicine. You may need to stop using this medicine several days before you have surgery or medical tests. · Tell any doctor or dentist who treats you that you are using this medicine. This medicine may affect certain medical test results. · Your doctor will check your progress and the effects of this medicine at regular visits. Keep all appointments. · Keep all medicine out of the reach of children. Never share your medicine with anyone.   Possible Side Effects While Using This Medicine:   Call your doctor right away if you notice any of these side effects:  · Allergic reaction: Itching or hives, swelling in your face or hands, swelling or tingling in your mouth or throat, chest tightness, trouble breathing  · Blistering, peeling, red skin rash  · Breast lumps, tenderness, pain, swelling, or discharge  · Chest pain or tightness, trouble breathing, or coughing up blood  · Numbness or weakness on one side of your body, sudden or severe headache, problems with vision, speech, or walking  · Pain in your lower leg (calf)  · Sudden and severe stomach pain, nausea, vomiting, lightheadedness  · Unusual or unexpected vaginal bleeding or heavy bleeding  · Unusual sweating, fainting  · Vision loss, double vision  · Yellow skin or eyes  If you notice these less serious side effects, talk with your doctor:   · Darkened skin on your face  · Depression, mood changes  · Headaches  · Light spotting or bleeding between periods  If you notice other side effects that you think are caused by this medicine, tell your doctor. Call your doctor for medical advice about side effects. You may report side effects to FDA at 0-471-FDA-3826  © 2017 [address] Information is for End User's use only and may not be sold, redistributed or otherwise used for commercial purposes. The above information is an  only. It is not intended as medical advice for individual conditions or treatments. Talk to your doctor, nurse or pharmacist before following any medical regimen to see if it is safe and effective for you.

## 2018-03-26 NOTE — PROGRESS NOTES
Chief Complaint   Patient presents with    Menstrual Problem     Pt states when she tried to fill Rx for OCP, she was unable to. The pharmacy filled RX 3 days after she was to start the new pack, by then she had already started her cycle. Pt states bleeding since 3/15/18.

## 2018-03-26 NOTE — MR AVS SNAPSHOT
[address] [facility] [address] [place] [facility] 
[phone] Patient: Neli Perez MRN: [id number] :1985 Visit Information Date & Time Provider Department Dept. Phone Encounter #  
 3/26/2018  2:40 PM Flavio Salazar MD [facility] AT [address] [facility] [id number] Upcoming Health Maintenance Date Due Influenza Age 5 to Adult 2017 PAP AKA CERVICAL CYTOLOGY 3/17/2018 Allergies as of 3/26/2018  Review Complete On: 3/26/2018 By: Flavio Salazar MD  
  
 Severity Noted Reaction Type Reactions Nuts [Tree Nut]  2014    Shortness of Breath Current Immunizations  Never Reviewed No immunizations on file. Not reviewed this visit Vitals BP Pulse Temp Resp Height(growth percentile) Weight(growth percentile) 138/90 (BP 1 Location: Right arm, BP Patient Position: Sitting) 88 97.7 °F (36.5 °C) (Oral) 18 5' 6\" (1.676 m) 233 lb 9.6 oz (106 kg) LMP BMI OB Status Smoking Status 03/15/2018 37.7 kg/m2 Having regular periods Current Every Day Smoker Vitals History BMI and BSA Data Body Mass Index Body Surface Area 37.7 kg/m 2 2.22 m 2 Preferred Pharmacy Pharmacy Name Phone [facility] [address] [place] [address] [phone] Your Updated Medication List  
  
   
This list is accurate as of 3/26/18  3:55 PM.  Always use your most recent med list.  
  
  
  
  
 cephALEXin 500 mg capsule Commonly known as:  Taylor Berrios Take 1 Cap by mouth two (2) times a day for 7 days. L-Norgest&E Estradiol-E Estrad 0.15 mg-30 mcg (84)/10 mcg (7) 3mpk Commonly known as:  Misael Dinning Take 1 Tab by mouth daily. Indications: MENORRHAGIA  
  
 lisinopril-hydroCHLOROthiazide 20-25 mg per tablet Commonly known as:  Janay Awkward Take 1 Tab by mouth daily. norgestimate-ethinyl estradiol 0.25-35 mg-mcg Tab Commonly known as:  Azalee Octave Take 1 Tab by mouth daily. Skip placebos for 3 packs. VITAMIN B COMPLEX PO Take  by mouth. Patient Instructions Ethinyl Estradiol/Norgestimate (By mouth) Ethinyl Estradiol (ETH-i-nil es-tra-DYE-ol), Norgestimate (ccg-XQP-ok-mate) Prevents pregnancy and treats acne. This medicine is commonly called a birth control pill. Brand Name(s): Estarylla, Femynor, Mono-Linyah, MonoNessa, Ortho Tri-Cyclen, Ortho Tri-Cyclen Lo, Ortho-Cyclen, Previfem, Sprintec, Tri Femynor, Tri-Estarylla, Tri-Linyah, [place], Pako, Tri-Lo-Sprintec There may be other brand names for this medicine. When This Medicine Should Not Be Used: This medicine is not right for everyone. Do not use it if you had an allergic reaction to ethinyl estradiol or norgestimate, or if you are pregnant or have unusual vaginal bleeding that has not been checked by your doctor. Do not use it if you have liver disease or liver cancer, breast cancer, or blood clotting problems. Do not use it if you have high blood pressure, certain heart problems, or diabetes with kidney, eye, nerve, or blood vessel damage. How to Use This Medicine:  
Tablet · Your doctor will tell you how much medicine to use. Do not use more than directed. · Each brand of birth control pills has specific directions. Read and follow the patient instructions for your prescribed brand. Talk to your doctor or pharmacist if you have any questions. · Ask your doctor if you should use a second form of birth control for the first 7 days of your first cycle of pills. · Take your pill at the same time every day. Birth control pills work best when there is no more than 24 hours between doses. Keep the pills in the original container. Take the pills in the order that they appear in the container.  
· Follow the instructions in the patient leaflet or call your doctor if you vomit or have diarrhea within 3 to 4 hours of taking this medicine. · Missed dose: Read and carefully follow the patient instructions if you miss a dose. You may need to use a second form of birth control for 1 week. You could have light bleeding or spotting any time you do not take a pill on time. The more pills you miss, the more likely you are to have bleeding. · Store the medicine in a closed container at room temperature, away from heat, moisture, and direct light. Store the medicine in its original package. Drugs and Foods to Avoid: Ask your doctor or pharmacist before using any other medicine, including over-the-counter medicines, vitamins, and herbal products. · Do not use this medicine together with medicine to treat hepatitis C virus infection, including ombitasvir/paritaprevir/ritonavir, with or without dasabuvir. · Some foods and medicines can affect how birth control pills work. Tell your doctor if you are also using the following: ¨ Acetaminophen, aprepitant, ascorbic acid, aspirin, atorvastatin, bosentan, clofibrate, colesevelam, cyclosporine, morphine, prednisolone, rosuvastatin, Alex's wort, temazepam, theophylline, tizanidine ¨ Medicine to treat an infection (including griseofulvin, fluconazole, itraconazole, ketoconazole, rifabutin, rifampicin, voriconazole ¨ Medicine to treat HIV/AIDS (including amprenavir/ritonavir, atazanavir/ritonavir, boceprevir, darunavir/ritonavir, etravirine, fosamprenavir/ritonavir, indinavir, lopinavir/ritonavir, nelfinavir, nevirapine, ritonavir, telaprevir, tipranavir/ritonavir) ¨ Medicine to treat seizures (including carbamazepine, felbamate, lamotrigine, oxcarbazepine, phenytoin, rufinamide, topiramate) ¨ Thyroid replacement medicine · Do not eat grapefruit or drink grapefruit juice while you are using this medicine. Warnings While Using This Medicine: · Tell your doctor right away if you think you have become pregnant.  If you miss 2 monthly periods in a row, call your doctor for a pregnancy test before you take any more pills. · Tell your doctor if you are breastfeeding, or if you had given birth within 4 weeks before you start using this medicine. Tell your doctor if you have cervical cancer, diabetes, epilepsy, gallbladder disease, migraine headaches, heart or blood vessel disease, high cholesterol, a history of chloasma gravidarum (skin discoloration of the face during pregnancy), or depression, or a family history of breast cancer. Tell your doctor if you smoke or if you are having a surgery that requires inactivity for a long time. · This medicine may cause the following problems: 
¨ Increased risk of heart attack, stroke, or blood clots ¨ Liver problems (including cancer or tumors) ¨ High blood pressure ¨ Gallbladder disease ¨ High cholesterol or fats in the blood ¨ Possible increased risk of breast or cervical cancer · This medicine will not protect you from HIV/AIDS or other sexually transmitted diseases. · You might have spotting or irregular bleeding when you first start using this medicine. You might have unplanned bleeding if you miss a dose or are late taking it. However, if you have heavy bleeding, call your doctor. · If you miss two periods in a row, call your doctor for a pregnancy test before you take any more pills. · Tell any doctor or dentist who treats you that you are using this medicine. You may need to stop using this medicine several days before you have surgery or medical tests. · Tell any doctor or dentist who treats you that you are using this medicine. This medicine may affect certain medical test results. · Your doctor will check your progress and the effects of this medicine at regular visits. Keep all appointments. · Keep all medicine out of the reach of children. Never share your medicine with anyone. Possible Side Effects While Using This Medicine: Call your doctor right away if you notice any of these side effects: · Allergic reaction: Itching or hives, swelling in your face or hands, swelling or tingling in your mouth or throat, chest tightness, trouble breathing · Blistering, peeling, red skin rash · Breast lumps, tenderness, pain, swelling, or discharge · Chest pain or tightness, trouble breathing, or coughing up blood · Numbness or weakness on one side of your body, sudden or severe headache, problems with vision, speech, or walking · Pain in your lower leg (calf) · Sudden and severe stomach pain, nausea, vomiting, lightheadedness · Unusual or unexpected vaginal bleeding or heavy bleeding · Unusual sweating, fainting · Vision loss, double vision · Yellow skin or eyes If you notice these less serious side effects, talk with your doctor: · Darkened skin on your face · Depression, mood changes · Headaches · Light spotting or bleeding between periods If you notice other side effects that you think are caused by this medicine, tell your doctor. Call your doctor for medical advice about side effects. You may report side effects to FDA at 6-627-FDA-0599 © 2017 [address] Information is for End User's use only and may not be sold, redistributed or otherwise used for commercial purposes. The above information is an  only. It is not intended as medical advice for individual conditions or treatments. Talk to your doctor, nurse or pharmacist before following any medical regimen to see if it is safe and effective for you. Introducing hospitals & HEALTH SERVICES! Jagruti Little: Thank you for requesting a Vingle account. Our records indicate that you already have an active Vingle account. You can access your account anytime at [email or web address] Did you know that you can access your hospital and ER discharge instructions at any time in Vingle?   You can also review all of your test results from your hospital stay or ER visit. Additional Information If you have questions, please visit the Frequently Asked Questions section of the VISup website at [email or web address] Remember, VISup is NOT to be used for urgent needs. For medical emergencies, dial 911. Now available from your iPhone and Android! Please provide this summary of care documentation to your next provider. Your primary care clinician is listed as Anais Cody. If you have any questions after today's visit, please call [phone].

## 2018-03-26 NOTE — PROGRESS NOTES
29 y.o. who presents with AUB    Patient was seen on 2/12/18. She noted regular cycles, which tended to be heavy and painful . She desired to go on [place]. When insurance did not cover this, she was started on Sprintec, with plan to cycle q3 months. Insurance would not cover this, so she was made to cycle. Missed about 3 days. Then restarted Sprintec. This slowed down the bleeding a lot. Would occasionally get sharp pain though. She was given a week of premarin to take with the birth control pill. This did not stop the bleeding, but it did not increase as well. Was spotting around this point. Bleeding has not been heavy, but it has not stopped. Has been bleeding since the 3/15/18. Patient was seen in ED on 3/21/18 for bleeding. She was found to have a UTI and was treated with Keflex. Due for placebo in 3 days. Just switched to pantyliner this weekend. Was on Seasonique in the distant past and Ashley Pert recently and did well on those. Review of symptoms:  Constitutional: negative  Urinary: negative  CV: negative    Neuro: negative  Resp: negative   Psych: negative  GI: negative    Musculoskeletal: negative  GYN: per HPI    Integumentary: negative    Physical exam:    Gen: AOx3, NAD  Resp: No respiratory distress  GYN: Deferred    A/P  29 y.o. with AUB from birth control pills. - Suspect that bleeding is just from body adjusting to birth control  - Will continue Sprintec for now. Due to cycle in 3 days. Will take placebos. If continues to bleeding into the start of the next pack, patient to call and we will start Ashley Pert. Will forego cycling q3 months for now, until her body adjusts to the birth control pills    Patient agrees with plan. Return to clinic PRN or for annual exam    Spent greater than 25 minutes with patient, over 50% of which was spent counselling.

## 2018-05-15 RX ORDER — NORGESTIMATE AND ETHINYL ESTRADIOL 0.25-0.035
1 KIT ORAL DAILY
Qty: 1 DOSE PACK | Refills: 12 | Status: SHIPPED | OUTPATIENT
Start: 2018-05-15 | End: 2018-10-19

## 2018-05-15 RX ORDER — NORGESTIMATE AND ETHINYL ESTRADIOL 0.25-0.035
1 KIT ORAL DAILY
Qty: 4 DOSE PACK | Refills: 5 | Status: SHIPPED | OUTPATIENT
Start: 2018-05-15 | End: 2018-10-19

## 2018-09-13 ENCOUNTER — TELEPHONE (OUTPATIENT)
Dept: OBGYN CLINIC | Age: 33
End: 2018-09-13

## 2018-09-13 NOTE — TELEPHONE ENCOUNTER
Patient states that Dr Edvin Bryant put her on [address] [facility], with it makes her bleed every other day. She would like another medication called in that will not make her bleed so much. Albina Salter

## 2018-09-14 RX ORDER — DROSPIRENONE AND ETHINYL ESTRADIOL 0.02-3(28)
1 KIT ORAL DAILY
Qty: 1 DOSE PACK | Refills: 12 | Status: SHIPPED | OUTPATIENT
Start: 2018-09-14 | End: 2018-11-01 | Stop reason: SDUPTHER

## 2018-09-14 NOTE — TELEPHONE ENCOUNTER
Pt informed that Dr. Tasneem Rios sent a prescription of kilo for her to try. Advised to call office if there is no change in the bleeding. Understanding voiced.

## 2018-09-14 NOTE — TELEPHONE ENCOUNTER
Will send a script for Carmen to [facility] pharmacy. We can see if her body tolerates that any better. Thank you.

## 2018-10-19 ENCOUNTER — HOSPITAL ENCOUNTER (EMERGENCY)
Age: 33
Discharge: HOME | End: 2018-10-19
Attending: EMERGENCY MEDICINE
Payer: COMMERCIAL

## 2018-10-19 VITALS
SYSTOLIC BLOOD PRESSURE: 150 MMHG | HEIGHT: 66 IN | DIASTOLIC BLOOD PRESSURE: 90 MMHG | RESPIRATION RATE: 16 BRPM | WEIGHT: 197.9 LBS | TEMPERATURE: 98.4 F | BODY MASS INDEX: 31.81 KG/M2 | OXYGEN SATURATION: 99 % | HEART RATE: 96 BPM

## 2018-10-19 DIAGNOSIS — H65.192 ACUTE MIDDLE EAR EFFUSION, LEFT: Primary | ICD-10-CM

## 2018-10-19 DIAGNOSIS — H66.90 ACUTE OTITIS MEDIA, UNSPECIFIED OTITIS MEDIA TYPE: ICD-10-CM

## 2018-10-19 PROCEDURE — 99283 EMERGENCY DEPT VISIT LOW MDM: CPT

## 2018-10-19 RX ORDER — LORATADINE 10 MG/1
10 TABLET ORAL DAILY
Qty: 20 TAB | Refills: 0 | Status: SHIPPED | OUTPATIENT
Start: 2018-10-19 | End: 2021-02-11

## 2018-10-19 RX ORDER — AMOXICILLIN 875 MG/1
875 TABLET, FILM COATED ORAL 2 TIMES DAILY
Qty: 20 TAB | Refills: 0 | Status: SHIPPED | OUTPATIENT
Start: 2018-10-19 | End: 2018-10-29

## 2018-10-19 NOTE — ED NOTES
Emergency Department Nursing Plan of Care The Nursing Plan of Care is developed from the Nursing assessment and Emergency Department Attending provider initial evaluation. The plan of care may be reviewed in the ED Provider note. The Plan of Care was developed with the following considerations:  
Patient / Family readiness to learn indicated by:verbalized understanding Persons(s) to be included in education: patient Barriers to Learning/Limitations:No 
 
Signed Lucina Giraldo 10/19/2018   9:17 AM 
 
See nursing assessment Patient is alert and oriented x 4 and in no acute distress at this time. Respirations are at a regular rate, depth, and pattern. Patient updated on plan of care and has no questions or concerns at this time.

## 2018-10-19 NOTE — DISCHARGE INSTRUCTIONS

## 2018-10-19 NOTE — LETTER
[facility] EMERGENCY DEPT 
[address] [facility] [id number] [phone] Work/School Note Date: 10/19/2018 To Whom It May concern: 
 
Makenna Gaona was seen and treated today in the emergency room by the following provider(s): 
Attending Provider: Lenin Jean MD 
Nurse Practitioner: Giovana Robins NP. Makenna Gaona may return to work on oct 22. Sincerely, Eric Manuel NP

## 2018-10-19 NOTE — ED PROVIDER NOTES
EMERGENCY DEPARTMENT HISTORY AND PHYSICAL EXAM 
 
Date: 10/19/2018 Patient Name: Jonelle Barriga History of Presenting Illness Chief Complaint Patient presents with  Dizziness  
  off balance, nausea, hx of vertigo  Ear Fullness History Provided By: Patient Chief Complaint: ear pain Duration: one Weeks Timing:  Acute Location: R and L ears Quality: Aching and Pressure Severity: 4 out of 10 Modifying Factors: none Associated Symptoms: dizziness HPI: Jonelle Barriga is a 29 y.o. female with a PMH of No significant past medical history who presents with bilateral ear pain with fullness left ear. denies fever nasal congestion reports dizziness PCP: Jacqueline Castillo MD 
 
Current Outpatient Medications Medication Sig Dispense Refill  amoxicillin (AMOXIL) 875 mg tablet Take 1 Tab by mouth two (2) times a day for 10 days. 20 Tab 0  
 loratadine (CLARITIN) 10 mg tablet Take 1 Tab by mouth daily. 20 Tab 0  
 drospirenone-ethinyl estradiol (RYLEE) 3-0.02 mg tab Take 1 Tab by mouth daily. 1 Dose Pack 12  
 VITAMIN B COMPLEX PO Take  by mouth. Past History Past Medical History: 
Past Medical History:  
Diagnosis Date  Headache  Hypertension Past Surgical History: 
Past Surgical History:  
Procedure Laterality Date  HX GYN    
 ectopic pregnancy - lapartomy Family History: 
Family History Problem Relation Age of Onset  Hypertension Mother  Migraines Mother  Cancer Maternal Grandmother   
     brain tumor Social History: 
Social History Tobacco Use  Smoking status: Current Every Day Smoker Packs/day: 0.25  Smokeless tobacco: Never Used  Tobacco comment: 2-3 cigarettes/day Substance Use Topics  Alcohol use: Yes Comment: occassionally  Drug use: No  
 
 
Allergies: Allergies Allergen Reactions  Nuts [Tree Nut] Shortness of Breath Review of Systems Review of Systems Constitutional: Negative for fatigue and fever. HENT: Positive for ear pain. Bilateral ear fullness Respiratory: Negative for shortness of breath and wheezing. Cardiovascular: Negative for chest pain and palpitations. Gastrointestinal: Negative for abdominal pain. Musculoskeletal: Negative for arthralgias, myalgias, neck pain and neck stiffness. Skin: Negative for pallor and rash. Neurological: Negative for dizziness, tremors, weakness and headaches. Hematological: Negative for adenopathy. All other systems reviewed and are negative. Physical Exam  
 
Vitals:  
 10/19/18 5570 BP: 150/90 Pulse: 96  
Resp: 16 Temp: 98.4 °F (36.9 °C) SpO2: 99% Weight: 89.8 kg (197 lb 14.4 oz) Height: 5' 6\" (1.676 m) Physical Exam  
Constitutional: She is oriented to person, place, and time. She appears well-developed and well-nourished. No distress. HENT:  
Head: Normocephalic and atraumatic. Right Ear: External ear normal.  
Nose: Nose normal.  
Mouth/Throat: Oropharynx is clear and moist.  
Left ear effusion  Erythema R external auditory canal  
Eyes: Conjunctivae are normal.  
Neck: Normal range of motion. Neck supple. Cardiovascular: Normal rate, regular rhythm and normal heart sounds. Pulmonary/Chest: Effort normal and breath sounds normal. No respiratory distress. She has no wheezes. Abdominal: Soft. Bowel sounds are normal. There is no tenderness. Musculoskeletal: Normal range of motion. Lymphadenopathy:  
  She has no cervical adenopathy. Neurological: She is alert and oriented to person, place, and time. No cranial nerve deficit. Coordination normal.  
Skin: Skin is warm and dry. No rash noted. Psychiatric: She has a normal mood and affect. Her behavior is normal. Judgment and thought content normal.  
Nursing note and vitals reviewed. Diagnostic Study Results Labs - No results found for this or any previous visit (from the past 12 hour(s)). Radiologic Studies - No orders to display CT Results  (Last 48 hours) None CXR Results  (Last 48 hours) None Medical Decision Making I am the first provider for this patient. I reviewed the vital signs, available nursing notes, past medical history, past surgical history, family history and social history. Vital Signs-Reviewed the patient's vital signs. Records Reviewed: Nursing Notes Disposition: 
home DISCHARGE NOTE:  
 
 
  Care plan outlined and precautions discussed. Patient has no new complaints, changes, or physical findings. All medications were reviewed with the patient; will d/c home with neomycin polymixin otic amoxicillin. All of pt's questions and concerns were addressed. Patient was instructed and agrees to follow up with PCP, as well as to return to the ED upon further deterioration. Patient is ready to go home. Follow-up Information Follow up With Specialties Details Why Contact Info Jerry Valentino MD Internal Medicine In 2 days  [address] [facility] 
[phone] Discharge Medication List as of 10/19/2018  9:35 AM  
  
START taking these medications Details  
amoxicillin (AMOXIL) 875 mg tablet Take 1 Tab by mouth two (2) times a day for 10 days. , Normal, Disp-20 Tab, R-0  
  
loratadine (CLARITIN) 10 mg tablet Take 1 Tab by mouth daily. , Normal, Disp-20 Tab, R-0  
  
  
CONTINUE these medications which have NOT CHANGED Details  
drospirenone-ethinyl estradiol (RYLEE) 3-0.02 mg tab Take 1 Tab by mouth daily. , Normal, Disp-1 Dose Pack, R-12 VITAMIN B COMPLEX PO Take  by mouth., Historical Med  
  
  
 
 
Provider Notes (Medical Decision Making): DDX AOM OE ear effusion Procedures: 
Procedures Diagnosis Clinical Impression: 1. Acute middle ear effusion, left 2. Acute otitis media, unspecified otitis media type

## 2018-11-02 RX ORDER — DROSPIRENONE AND ETHINYL ESTRADIOL 0.02-3(28)
1 KIT ORAL DAILY
Qty: 84 TAB | Refills: 3 | Status: SHIPPED | OUTPATIENT
Start: 2018-11-02 | End: 2019-01-14 | Stop reason: CLARIF

## 2019-01-14 ENCOUNTER — TELEPHONE (OUTPATIENT)
Dept: OBGYN CLINIC | Age: 34
End: 2019-01-14

## 2019-01-14 RX ORDER — DESOGESTREL AND ETHINYL ESTRADIOL 0.15-0.03
1 KIT ORAL DAILY
Qty: 1 PACKAGE | Refills: 10 | Status: SHIPPED | OUTPATIENT
Start: 2019-01-14 | End: 2021-02-11

## 2019-03-12 ENCOUNTER — HOSPITAL ENCOUNTER (EMERGENCY)
Age: 34
Discharge: HOME | End: 2019-03-13
Attending: EMERGENCY MEDICINE
Payer: MEDICAID

## 2019-03-12 VITALS
HEIGHT: 66 IN | OXYGEN SATURATION: 100 % | DIASTOLIC BLOOD PRESSURE: 92 MMHG | RESPIRATION RATE: 16 BRPM | SYSTOLIC BLOOD PRESSURE: 145 MMHG | HEART RATE: 87 BPM | TEMPERATURE: 98.2 F | WEIGHT: 180 LBS | BODY MASS INDEX: 28.93 KG/M2

## 2019-03-12 DIAGNOSIS — M54.9 OTHER ACUTE BACK PAIN: Primary | ICD-10-CM

## 2019-03-12 PROCEDURE — 99283 EMERGENCY DEPT VISIT LOW MDM: CPT

## 2019-03-12 PROCEDURE — 96372 THER/PROPH/DIAG INJ SC/IM: CPT

## 2019-03-12 RX ORDER — METHOCARBAMOL 500 MG/1
500 TABLET, FILM COATED ORAL
Status: COMPLETED | OUTPATIENT
Start: 2019-03-12 | End: 2019-03-13

## 2019-03-12 NOTE — LETTER
[facility] EMERGENCY DEPT 
[address] [place] [id number] 
[phone] Work/School Note Date: 3/12/2019 To Whom It May concern: 
 
Conrado Ruano was seen and treated today in the emergency room by the following provider(s): 
Attending Provider: Destiny Cox MD 
Physician Assistant: DERECK Ulloa. Conrado Ruano may return to work on 3/15/19. Sincerely, DERECK Ramos

## 2019-03-13 LAB
D DIMER PPP FEU-MCNC: 0.56 MG/L FEU (ref 0–0.65)
HCG UR QL: NEGATIVE

## 2019-03-13 PROCEDURE — 36415 COLL VENOUS BLD VENIPUNCTURE: CPT

## 2019-03-13 PROCEDURE — 96372 THER/PROPH/DIAG INJ SC/IM: CPT

## 2019-03-13 PROCEDURE — 81025 URINE PREGNANCY TEST: CPT

## 2019-03-13 PROCEDURE — 74011250637 HC RX REV CODE- 250/637: Performed by: PHYSICIAN ASSISTANT

## 2019-03-13 PROCEDURE — 74011250636 HC RX REV CODE- 250/636: Performed by: PHYSICIAN ASSISTANT

## 2019-03-13 PROCEDURE — 85379 FIBRIN DEGRADATION QUANT: CPT

## 2019-03-13 RX ORDER — METHOCARBAMOL 500 MG/1
500 TABLET, FILM COATED ORAL 4 TIMES DAILY
Qty: 30 TAB | Refills: 0 | Status: SHIPPED | OUTPATIENT
Start: 2019-03-13 | End: 2019-09-23 | Stop reason: ALTCHOICE

## 2019-03-13 RX ORDER — KETOROLAC TROMETHAMINE 30 MG/ML
30 INJECTION, SOLUTION INTRAMUSCULAR; INTRAVENOUS
Status: COMPLETED | OUTPATIENT
Start: 2019-03-13 | End: 2019-03-13

## 2019-03-13 RX ADMIN — KETOROLAC TROMETHAMINE 30 MG: 30 INJECTION, SOLUTION INTRAMUSCULAR at 00:20

## 2019-03-13 RX ADMIN — METHOCARBAMOL TABLETS 500 MG: 500 TABLET, COATED ORAL at 00:20

## 2019-03-13 NOTE — ED NOTES
Pt presents ambulatory to ED complaining of upper mid to right side back pain beginning Friday. Pt denies injury/trauma. Pt states she has taken Tylenol, last dose at 1800 yesterday, with some relief. Pt is alert and oriented x 4, RR even and unlabored, skin is warm and dry. Assesment completed and pt updated on plan of care. Emergency Department Nursing Plan of Care       The Nursing Plan of Care is developed from the Nursing assessment and Emergency Department Attending provider initial evaluation. The plan of care may be reviewed in the ED Provider note.     The Plan of Care was developed with the following considerations:   Patient / Family readiness to learn indicated by:verbalized understanding  Persons(s) to be included in education: patient  Barriers to Learning/Limitations:No    [address]    3/13/2019   12:18 AM

## 2019-03-13 NOTE — ED PROVIDER NOTES
EMERGENCY DEPARTMENT HISTORY AND PHYSICAL EXAM    Date: 3/12/2019  Patient Name: Nate Hernandez    History of Presenting Illness     Chief Complaint   Patient presents with    Back Pain         History Provided By: Patient    HPI: Nate Hernandez is a 34 y.o. female with a PMH of hypertension who presents with cc of back pain to her mid back that started on Friday. Pt states the pain worsens with twisting motion and gets better with tylenol. Pt states at the onset the pain was radiating down her right arm, tingling and numbness but that has resolved. Pt states she was just sitting when the pain started but states she might have twisted, is unsure. Pt denies any long distance travels by plane or car, recent surgeries or fractures, history of ocp usage, unilateral swelling, syncope, dizziness, hematochezia, saddle anesthesia, loss of bowel/bladder function, hematuria, fevers, chills, nausea, vomiting, chest pain, shortness of breath, headache, rash, diarrhea, sweating or weight loss. All other ROS negative at this time  Pt is in no acute distress and is speaking in full sentences      PCP: Jennifer Tovar MD    Current Outpatient Medications   Medication Sig Dispense Refill    VITAMIN B COMPLEX PO Take  by mouth.  desogestrel-ethinyl estradiol (DESOGEN) 0.15-0.03 mg tab Take 1 Tab by mouth daily. 1 Package 10    loratadine (CLARITIN) 10 mg tablet Take 1 Tab by mouth daily.  20 Tab 0       Past History     Past Medical History:  Past Medical History:   Diagnosis Date    Headache     Hypertension        Past Surgical History:  Past Surgical History:   Procedure Laterality Date    HX GYN      ectopic pregnancy - lapartomy       Family History:  Family History   Problem Relation Age of Onset    Hypertension Mother     Migraines Mother     Cancer Maternal Grandmother         brain tumor       Social History:  Social History     Tobacco Use    Smoking status: Current Every Day Smoker     Packs/day: 0.25    Smokeless tobacco: Never Used    Tobacco comment: 2-3 cigarettes/day   Substance Use Topics    Alcohol use: Yes     Comment: occassionally    Drug use: No       Allergies: Allergies   Allergen Reactions    Nuts [Tree Nut] Shortness of Breath         Review of Systems   Review of Systems   Constitutional: Negative. Negative for chills and fever. HENT: Negative. Eyes: Negative. Respiratory: Negative. Negative for shortness of breath. Cardiovascular: Negative. Negative for chest pain, palpitations and leg swelling. Gastrointestinal: Negative. Negative for abdominal pain, diarrhea, nausea and vomiting. Endocrine: Negative. Genitourinary: Negative. Musculoskeletal: Positive for back pain. Skin: Negative. Allergic/Immunologic: Negative. Neurological: Negative. Negative for dizziness, facial asymmetry, light-headedness, numbness and headaches. Hematological: Negative. Psychiatric/Behavioral: Negative. All other systems reviewed and are negative. Physical Exam     Vitals:    03/12/19 2250   BP: (!) 145/92   Pulse: 87   Resp: 16   Temp: 98.2 °F (36.8 °C)   SpO2: 100%   Weight: 81.6 kg (180 lb)   Height: 5' 6\" (1.676 m)     Physical Exam   Constitutional: She is oriented to person, place, and time. She appears well-developed and well-nourished. No distress. HENT:   Head: Normocephalic and atraumatic. Right Ear: External ear normal.   Left Ear: External ear normal.   Nose: Nose normal.   Mouth/Throat: Oropharynx is clear and moist.   Eyes: Conjunctivae and EOM are normal. Pupils are equal, round, and reactive to light. Neck: Normal range of motion. No tracheal deviation present. Cardiovascular: Normal rate, regular rhythm, normal heart sounds and intact distal pulses. Pulmonary/Chest: Effort normal and breath sounds normal. No respiratory distress. She has no wheezes. Abdominal: Soft. Bowel sounds are normal. She exhibits no distension.  There is no tenderness. There is no rebound, no CVA tenderness, no tenderness at McBurney's point and negative Chapman's sign. Musculoskeletal: Normal range of motion. She exhibits no edema, tenderness or deformity. Cervical back: Normal. She exhibits normal range of motion, no tenderness, no bony tenderness, no swelling, no edema, no deformity, no laceration, no pain, no spasm and normal pulse. Thoracic back: Normal. She exhibits normal range of motion, no tenderness, no bony tenderness, no swelling, no edema, no deformity, no laceration, no pain, no spasm and normal pulse. Lumbar back: Normal. She exhibits normal range of motion, no tenderness, no bony tenderness, no swelling, no edema, no deformity, no laceration, no pain, no spasm and normal pulse. Back:    Lymphadenopathy:     She has no cervical adenopathy. Neurological: She is alert and oriented to person, place, and time. She has normal reflexes. She displays normal reflexes. No cranial nerve deficit. She exhibits normal muscle tone. Coordination normal.   Skin: Skin is warm and dry. She is not diaphoretic. No pallor. Psychiatric: She has a normal mood and affect. Her behavior is normal. Judgment and thought content normal.   Nursing note and vitals reviewed. Diagnostic Study Results     Labs -     Recent Results (from the past 12 hour(s))   D DIMER    Collection Time: 03/13/19 12:08 AM   Result Value Ref Range    D-dimer 0.56 0.00 - 0.65 mg/L FEU   HCG URINE, QL. - POC    Collection Time: 03/13/19 12:17 AM   Result Value Ref Range    Pregnancy test,urine (POC) NEGATIVE  NEG         Radiologic Studies -   No orders to display     CT Results  (Last 48 hours)    None        CXR Results  (Last 48 hours)    None            Medical Decision Making   I am the first provider for this patient. I reviewed the vital signs, available nursing notes, past medical history, past surgical history, family history and social history.     Vital Signs-Reviewed the patient's vital signs. Records Reviewed: Nursing Notes, Old Medical Records, Previous Radiology Studies and Previous Laboratory Studies            Disposition:  Discharge     DISCHARGE NOTE:   Care plan outlined and precautions discussed. Patient has no new complaints, changes, or physical findings. Results of visit were reviewed with the patient. All medications were reviewed with the patient; will d/c home. All of pt's questions and concerns were addressed. Patient was instructed and agrees to follow up with pcp, as well as to return to the ED upon further deterioration. Patient is ready to go home. Follow-up Information    None         Current Discharge Medication List          Provider Notes (Medical Decision Making): These symptoms are consistent with a back strain. Less likely  pathology, aortic dissection or AAA, or cauda equina given that there are no red flags and a benign physical exam.     I have recommended rest, avoiding heavy lifting until better, use of intermittent heat (avoid sleeping on a heating pad), and use of OTC NSAID's (Advil, Aleve etc) or Tylenol prn for pain. Call PCP if back pain persists or she develops leg symptoms. It has also been explained that this may take up to three months to fully resolved and that smoking may slow that process even more. 12:53 AM pt states the back pain feels better after the meds    Procedures:  Procedures        Diagnosis     Clinical Impression:   1.  Other acute back pain

## 2019-03-13 NOTE — ED NOTES
Discharge instructions were given to the patient by DERECK Lopez. The patient left the Emergency Department ambulatory, alert and oriented and in no acute distress with 1 prescription. The patient was encouraged to call or return to the ED for worsening issues or problems and was encouraged to schedule a follow up appointment for continuing care. The patient verbalized understanding of discharge instructions and prescriptions, all questions were answered. The patient has no further concerns at this time.

## 2019-03-13 NOTE — DISCHARGE INSTRUCTIONS

## 2019-09-22 VITALS
BODY MASS INDEX: 30.26 KG/M2 | TEMPERATURE: 98.3 F | DIASTOLIC BLOOD PRESSURE: 93 MMHG | HEIGHT: 66 IN | WEIGHT: 188.27 LBS | RESPIRATION RATE: 16 BRPM | OXYGEN SATURATION: 100 % | SYSTOLIC BLOOD PRESSURE: 151 MMHG | HEART RATE: 110 BPM

## 2019-09-22 PROCEDURE — 99282 EMERGENCY DEPT VISIT SF MDM: CPT

## 2019-09-23 ENCOUNTER — APPOINTMENT (OUTPATIENT)
Dept: GENERAL RADIOLOGY | Age: 34
End: 2019-09-23
Attending: EMERGENCY MEDICINE
Payer: COMMERCIAL

## 2019-09-23 ENCOUNTER — HOSPITAL ENCOUNTER (EMERGENCY)
Age: 34
Discharge: HOME | End: 2019-09-23
Attending: EMERGENCY MEDICINE
Payer: COMMERCIAL

## 2019-09-23 DIAGNOSIS — S63.630A SPRAIN OF INTERPHALANGEAL JOINT OF RIGHT INDEX FINGER, INITIAL ENCOUNTER: ICD-10-CM
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
## 2019-09-23 DIAGNOSIS — S63.621A SPRAIN OF INTERPHALANGEAL JOINT OF RIGHT THUMB, INITIAL ENCOUNTER: Primary | ICD-10-CM

## 2019-09-23 PROCEDURE — 73140 X-RAY EXAM OF FINGER(S): CPT

## 2019-09-23 RX ORDER — NAPROXEN 500 MG/1
500 TABLET ORAL
Qty: 20 TAB | Refills: 0 | Status: SHIPPED | OUTPATIENT
Start: 2019-09-23 | End: 2021-02-11

## 2019-09-23 NOTE — ED PROVIDER NOTES
EMERGENCY DEPARTMENT HISTORY AND PHYSICAL EXAM      Date: 9/23/2019  Patient Name: Judy Peralta    History of Presenting Illness     Chief Complaint   Patient presents with    Finger Pain     History Provided By: Patient    HPI: Judy Peralta, 34 y.o. female presents by POV to the ED with cc of R (melidaiante) thumb and 2nd finger pain status post fall that occurred this evening. The patient was cleaning her school bus after a field trip when she stepped backwards and accidentally fell down the steps. She notes injuring her hand and striking her head. She denies loss of consciousness, headache, changes in vision, nausea, vomiting. Her head pain has improved since falling but she notes that her right hand continues to hurt. She took Tylenol without relief prior to arrival.  She denies prior injuries to the right hand. The pain is a constant and nonradiating pain that worsens with movement. There are no other complaints, changes, or physical findings at this time. Social Hx: Tobacco (1/4 ppd), EtOH (social), Illicit drug use (denies),  for [facility]     PCP: Geovanny Mark MD    No current facility-administered medications on file prior to encounter. Current Outpatient Medications on File Prior to Encounter   Medication Sig Dispense Refill    desogestrel-ethinyl estradiol (DESOGEN) 0.15-0.03 mg tab Take 1 Tab by mouth daily. 1 Package 10    loratadine (CLARITIN) 10 mg tablet Take 1 Tab by mouth daily. 20 Tab 0    VITAMIN B COMPLEX PO Take  by mouth.          Past History     Past Medical History:  Past Medical History:   Diagnosis Date    Headache     Hypertension        Past Surgical History:  Past Surgical History:   Procedure Laterality Date    HX GYN      ectopic pregnancy - lapartomy       Family History:  Family History   Problem Relation Age of Onset    Hypertension Mother     Migraines Mother     Cancer Maternal Grandmother         brain tumor Social History:  Social History     Tobacco Use    Smoking status: Current Every Day Smoker     Packs/day: 0.25    Smokeless tobacco: Never Used    Tobacco comment: 2-3 cigarettes/day   Substance Use Topics    Alcohol use: Yes     Comment: occassionally    Drug use: No       Allergies: Allergies   Allergen Reactions    Nuts [Tree Nut] Shortness of Breath         Review of Systems   Review of Systems   Constitutional: Negative for chills, diaphoresis and fever. HENT: Negative for congestion, ear pain, rhinorrhea and sore throat. Eyes: Negative for visual disturbance. Respiratory: Negative for cough and shortness of breath. Cardiovascular: Negative for chest pain. Gastrointestinal: Negative for abdominal pain, constipation, diarrhea, nausea and vomiting. Genitourinary: Negative for difficulty urinating, dysuria, frequency and hematuria. Musculoskeletal: Positive for arthralgias. Negative for myalgias. Neurological: Negative for seizures, syncope, weakness and headaches. All other systems reviewed and are negative. Physical Exam   Physical Exam   Constitutional: She is oriented to person, place, and time. She appears well-developed and well-nourished. No distress. 34 y.o. -American female    HENT:   Head: Normocephalic and atraumatic. Eyes: Conjunctivae are normal. Right eye exhibits no discharge. Left eye exhibits no discharge. Neck: Normal range of motion. Neck supple. Cardiovascular: Normal rate, regular rhythm and normal heart sounds. No murmur heard. Pulmonary/Chest: Effort normal and breath sounds normal. No respiratory distress. Musculoskeletal:   R HAND: TTP to the 2nd MCP joint and to the entire thumb. There is no swelling or deformity. Small subungual hematoma to the thumb. Distal NV intact. Cap refill < 2 sec. Ambulatory without difficulty. Lymphadenopathy:     She has no cervical adenopathy.    Neurological: She is alert and oriented to person, place, and time. Skin: Skin is warm and dry. She is not diaphoretic. Psychiatric: She has a normal mood and affect. Her behavior is normal.   Nursing note and vitals reviewed. Diagnostic Study Results     Labs - None    Radiologic Studies -   XR 2ND FINGER RT MIN 2 V   Final Result   IMPRESSION: No acute abnormality. XR THUMB RT MIN 2 V   Final Result   IMPRESSION: No acute abnormality. Medical Decision Making   I am the first provider for this patient. I reviewed the vital signs, available nursing notes, past medical history, past surgical history, family history and social history. Vital Signs-Reviewed the patient's vital signs. Patient Vitals for the past 12 hrs:   Temp Pulse Resp BP SpO2   09/22/19 2350 98.3 °F (36.8 °C) (!) 110 16 (!) 151/93 100 %     Records Reviewed: Nursing Notes    Provider Notes (Medical Decision Making):   Fracture, sprain, strain, contusion,     ED Course:   Initial assessment performed. The patients presenting problems have been discussed, and they are in agreement with the care plan formulated and outlined with them. I have encouraged them to ask questions as they arise throughout their visit. Critical Care Time: None    Disposition:  DISCHARGE NOTE:  12:42 AM  The pt is ready for discharge. The pt's signs, symptoms, diagnosis, and discharge instructions have been discussed and pt has conveyed their understanding. The pt is to follow up as recommended or return to ER should their symptoms worsen. Plan has been discussed and pt is in agreement. PLAN:  1. Current Discharge Medication List      START taking these medications    Details   naproxen (NAPROSYN) 500 mg tablet Take 1 Tab by mouth every twelve (12) hours as needed for Pain. Qty: 20 Tab, Refills: 0         CONTINUE these medications which have NOT CHANGED    Details   desogestrel-ethinyl estradiol (DESOGEN) 0.15-0.03 mg tab Take 1 Tab by mouth daily.   Qty: 1 Package, Refills: 10 loratadine (CLARITIN) 10 mg tablet Take 1 Tab by mouth daily. Qty: 20 Tab, Refills: 0      VITAMIN B COMPLEX PO Take  by mouth. 2.   Follow-up Information     Follow up With Specialties Details Why Olivier Johnson MD Hand Surgery In 1 week As needed [address] [phone]        3. Rest, ice and elevate the right hand  4. Purchase and use thumb spica splint if needed  Return to ED if worse     Diagnosis     Clinical Impression:   1. Sprain of interphalangeal joint of right thumb, initial encounter    2. Sprain of interphalangeal joint of right index finger, initial encounter    3. Fall, initial encounter          Please note that this dictation was completed with [employer], the computer voice recognition software. Quite often unanticipated grammatical, syntax, homophones, and other interpretive errors are inadvertently transcribed by the computer software. Please disregards these errors. Please excuse any errors that have escaped final proofreading. This note will not be viewable in [address]

## 2019-09-23 NOTE — LETTER
[address] 
[facility] EMERGENCY DEPT 
[address] [place] [address] Angele Goldmann [id number] 
[phone] Work/School Note Date: 9/22/2019 To Whom It May concern: 
 
Floresita Skelton was seen and treated today in the emergency room by the following provider(s): 
Attending Provider: Joceline Hurt DO Physician Assistant: Linda Muller. Please excuse Floresita Skelton from work on 9/23/19. Sincerely, Linda Lao

## 2019-09-23 NOTE — DISCHARGE INSTRUCTIONS
Patient Education        Finger Sprain: Care Instructions  Overview    A sprain is an injury to the tough fibers (ligaments) that connect bone to bone. This injury can happen in joints such as in your finger. Some sprains stretch the ligaments but don't tear them. More severe sprains can partly or completely tear the ligaments. Sprains can cause pain and swelling. It may take weeks to months before your finger can move easily and without pain. Resting the finger for a short time after the injury can help you heal. To keep the injured finger in position while it heals, your doctor may have put a splint on it. Or the doctor may have taped the finger to the one next to it. After the pain and swelling have gone down, your doctor may recommend exercises to strengthen your finger or more treatment if needed. Follow-up care is a key part of your treatment and safety. Be sure to make and go to all appointments, and call your doctor if you are having problems. It's also a good idea to know your test results and keep a list of the medicines you take. How can you care for yourself at home? · If your doctor put a splint on your finger, wear the splint as directed. Don't remove it until your doctor says it's okay. · If your fingers are taped together, make sure that the tape is snug. But it shouldn't be so tight that the fingers get numb or tingle. You can loosen the tape if it's too tight. If you need to retape your fingers, always put padding between the fingers before you put on the new tape. · Put ice or a cold pack on your finger for 10 to 20 minutes at a time. Try to do this every 1 to 2 hours for the first 3 days (when you are awake) or until the swelling goes down. Put a thin cloth between the ice and your skin. · Prop up your hand on a pillow when you ice it or anytime you sit or lie down during the next 3 days. Try to keep it above the level of your heart. This will help reduce swelling.   · Be safe with medicines. Read and follow all instructions on the label. ? If the doctor gave you a prescription medicine for pain, take it as prescribed. ? If you are not taking a prescription pain medicine, ask your doctor if you can take an over-the-counter medicine. · If your doctor recommends exercises, do them as directed. When should you call for help? Call your doctor now or seek immediate medical care if:    · You have new or worse pain.     · Your finger is cool or pale or changes color.     · Your finger is tingly, weak, or numb.    Watch closely for changes in your health, and be sure to contact your doctor if:    · You do not get better as expected. Where can you learn more? Go to http://osmin-juancarlos.info/. Enter F155 in the search box to learn more about \"Finger Sprain: Care Instructions. \"  Current as of: June 26, 2019  Content Version: 12.2  © 7578-1145 [employer]. Care instructions adapted under license by [employer] (which disclaims liability or warranty for this information). If you have questions about a medical condition or this instruction, always ask your healthcare professional. [place] [address] any warranty or liability for your use of this information.

## 2019-10-21 ENCOUNTER — HOSPITAL ENCOUNTER (EMERGENCY)
Age: 34
Discharge: HOME | End: 2019-10-21
Attending: EMERGENCY MEDICINE
Payer: COMMERCIAL

## 2019-10-21 VITALS
BODY MASS INDEX: 29.54 KG/M2 | WEIGHT: 183 LBS | TEMPERATURE: 98.4 F | RESPIRATION RATE: 18 BRPM | DIASTOLIC BLOOD PRESSURE: 93 MMHG | SYSTOLIC BLOOD PRESSURE: 159 MMHG | HEART RATE: 101 BPM | OXYGEN SATURATION: 100 %

## 2019-10-21 DIAGNOSIS — N93.8 DUB (DYSFUNCTIONAL UTERINE BLEEDING): Primary | ICD-10-CM

## 2019-10-21 LAB
APPEARANCE UR: ABNORMAL
BACTERIA URNS QL MICRO: ABNORMAL /HPF
BASOPHILS # BLD: 0 K/UL (ref 0–0.1)
BASOPHILS NFR BLD: 0 % (ref 0–1)
BILIRUB UR QL: NEGATIVE
COLOR UR: ABNORMAL
DIFFERENTIAL METHOD BLD: ABNORMAL
EOSINOPHIL # BLD: 0.1 K/UL (ref 0–0.4)
EOSINOPHIL NFR BLD: 1 % (ref 0–7)
EPITH CASTS URNS QL MICRO: ABNORMAL /LPF
ERYTHROCYTE [DISTWIDTH] IN BLOOD BY AUTOMATED COUNT: 13.5 % (ref 11.5–14.5)
GLUCOSE UR STRIP.AUTO-MCNC: NEGATIVE MG/DL
HCG UR QL: NEGATIVE
HCT VFR BLD AUTO: 38.1 % (ref 35–47)
HGB BLD-MCNC: 12.9 G/DL (ref 11.5–16)
HGB UR QL STRIP: ABNORMAL
IMM GRANULOCYTES # BLD AUTO: 0 K/UL (ref 0–0.04)
IMM GRANULOCYTES NFR BLD AUTO: 0 % (ref 0–0.5)
KETONES UR QL STRIP.AUTO: NEGATIVE MG/DL
LEUKOCYTE ESTERASE UR QL STRIP.AUTO: ABNORMAL
LYMPHOCYTES # BLD: 3.7 K/UL (ref 0.8–3.5)
LYMPHOCYTES NFR BLD: 44 % (ref 12–49)
MCH RBC QN AUTO: 24.9 PG (ref 26–34)
MCHC RBC AUTO-ENTMCNC: 33.9 G/DL (ref 30–36.5)
MCV RBC AUTO: 73.6 FL (ref 80–99)
MONOCYTES # BLD: 0.7 K/UL (ref 0–1)
MONOCYTES NFR BLD: 8 % (ref 5–13)
NEUTS SEG # BLD: 3.9 K/UL (ref 1.8–8)
NEUTS SEG NFR BLD: 47 % (ref 32–75)
NITRITE UR QL STRIP.AUTO: NEGATIVE
NRBC # BLD: 0 K/UL (ref 0–0.01)
NRBC BLD-RTO: 0 PER 100 WBC
PH UR STRIP: 7 [PH] (ref 5–8)
PLATELET # BLD AUTO: 206 K/UL (ref 150–400)
PMV BLD AUTO: 10.5 FL (ref 8.9–12.9)
PROT UR STRIP-MCNC: NEGATIVE MG/DL
RBC # BLD AUTO: 5.18 M/UL (ref 3.8–5.2)
RBC #/AREA URNS HPF: ABNORMAL /HPF (ref 0–5)
SP GR UR REFRACTOMETRY: 1.01 (ref 1–1.03)
UA: UC IF INDICATED,UAUC: ABNORMAL
UROBILINOGEN UR QL STRIP.AUTO: 1 EU/DL (ref 0.2–1)
WBC # BLD AUTO: 8.4 K/UL (ref 3.6–11)
WBC URNS QL MICRO: ABNORMAL /HPF (ref 0–4)

## 2019-10-21 PROCEDURE — 81001 URINALYSIS AUTO W/SCOPE: CPT

## 2019-10-21 PROCEDURE — 99283 EMERGENCY DEPT VISIT LOW MDM: CPT

## 2019-10-21 PROCEDURE — 36415 COLL VENOUS BLD VENIPUNCTURE: CPT

## 2019-10-21 PROCEDURE — 81025 URINE PREGNANCY TEST: CPT

## 2019-10-21 PROCEDURE — 85025 COMPLETE CBC W/AUTO DIFF WBC: CPT

## 2019-10-21 PROCEDURE — 87086 URINE CULTURE/COLONY COUNT: CPT

## 2019-10-21 NOTE — ED NOTES
Pt here for evaluation of vaginal bleeding for 15 days. Pt sts her usual cycle last 4 days. + vomiting yesterday x1 episode. Emergency Department Nursing Plan of Care       The Nursing Plan of Care is developed from the Nursing assessment and Emergency Department Attending provider initial evaluation. The plan of care may be reviewed in the ED Provider note.     The Plan of Care was developed with the following considerations:   Patient / Family readiness to learn indicated by:verbalized understanding  Persons(s) to be included in education: patient  Barriers to Learning/Limitations:No    Signed     Heide Louis RN    10/21/2019   10:32 AM

## 2019-10-21 NOTE — DISCHARGE INSTRUCTIONS
Patient Education        Abnormal Uterine Bleeding: Care Instructions  Your Care Instructions    Abnormal uterine bleeding (AUB) is irregular bleeding from the uterus that is longer or heavier than usual or does not occur at your regular time. Sometimes it is caused by changes in hormone levels. It can also be caused by growths in the uterus, such as fibroids or polyps. Sometimes a cause cannot be found. You may have heavy bleeding when you are not expecting your period. Your doctor may suggest a pregnancy test, if you think you are pregnant. Follow-up care is a key part of your treatment and safety. Be sure to make and go to all appointments, and call your doctor if you are having problems. It's also a good idea to know your test results and keep a list of the medicines you take. How can you care for yourself at home? · Be safe with medicines. Take pain medicines exactly as directed. ? If the doctor gave you a prescription medicine for pain, take it as prescribed. ? If you are not taking a prescription pain medicine, ask your doctor if you can take an over-the-counter medicine. · You may be low in iron because of blood loss. Eat a balanced diet that is high in iron and vitamin C. Foods rich in iron include red meat, shellfish, eggs, beans, and leafy green vegetables. Talk to your doctor about whether you need to take iron pills or a multivitamin. When should you call for help? Call 911 anytime you think you may need emergency care. For example, call if:    · You passed out (lost consciousness).    Call your doctor now or seek immediate medical care if:    · You have new or worse belly or pelvic pain.     · You have severe vaginal bleeding.     · You feel dizzy or lightheaded, or you feel like you may faint.    Watch closely for changes in your health, and be sure to contact your doctor if:    · You think you may be pregnant.     · Your bleeding gets worse.     · You do not get better as expected.    Where can you learn more? Go to http://osmin-juancarlos.info/. Enter O271 in the search box to learn more about \"Abnormal Uterine Bleeding: Care Instructions. \"  Current as of: February 19, 2019  Content Version: 12.2  © 0278-0508 [employer]. Care instructions adapted under license by [employer] (which disclaims liability or warranty for this information). If you have questions about a medical condition or this instruction, always ask your healthcare professional. [place] [address] any warranty or liability for your use of this information.

## 2019-10-21 NOTE — ED PROVIDER NOTES
EMERGENCY DEPARTMENT HISTORY AND PHYSICAL EXAM      Date: 10/21/2019  Patient Name: Chapin Lorenz    History of Presenting Illness     Chief Complaint   Patient presents with    Vaginal Bleeding       History Provided By: Patient    HPI: Chapin Lorenz, 34 y.o. female with PMHx unremarkable. Patient states that she has had some vaginal bleeding for the past 15 days. She states that her usual menstrual cycle started 4 days ago. She has had some nausea however denies any abdominal pain, flank pain, dysuria, vaginal discharge other than bleeding. She states that she had one episode of this previously her gynecologist to give her medications that she is not sure of the name of and it did generally help her vaginal bleeding. She is not able to see her gynecologist for approximately 1 week. She denies any weakness, syncope, palpitations or any other acute complaints at this time. She denies any abnormalities on previous Pap smears or any family history of any pelvic cancers. She does admit to smoking. Please note for examination and HPI were completed I did introduce myself as the physician assistant. Please note that this dictation was completed with [employer], the "[employer] voice recognition software. Quite often unanticipated grammatical, syntax, homophones, and other interpretive errors are inadvertently transcribed by the computer software. Please disregard these errors. Please excuse any errors that have escaped final proofreading. For further clarification on any chart please contact myself. Thank you. There are no other complaints, changes, or physical findings at this time. PCP: Terrance Jones MD    No current facility-administered medications on file prior to encounter. Current Outpatient Medications on File Prior to Encounter   Medication Sig Dispense Refill    naproxen (NAPROSYN) 500 mg tablet Take 1 Tab by mouth every twelve (12) hours as needed for Pain.  20 Tab 0    desogestrel-ethinyl estradiol (DESOGEN) 0.15-0.03 mg tab Take 1 Tab by mouth daily. 1 Package 10    loratadine (CLARITIN) 10 mg tablet Take 1 Tab by mouth daily. 20 Tab 0    VITAMIN B COMPLEX PO Take  by mouth. Past History     Past Medical History:  Past Medical History:   Diagnosis Date    Headache     Hypertension        Past Surgical History:  Past Surgical History:   Procedure Laterality Date    HX GYN      ectopic pregnancy - lapartomy       Family History:  Family History   Problem Relation Age of Onset    Hypertension Mother    Zada Sprain Migraines Mother     Cancer Maternal Grandmother         brain tumor       Social History:  Social History     Tobacco Use    Smoking status: Current Every Day Smoker     Packs/day: 0.25    Smokeless tobacco: Never Used    Tobacco comment: 2-3 cigarettes/day   Substance Use Topics    Alcohol use: Yes     Comment: occassionally    Drug use: No       Allergies: Allergies   Allergen Reactions    Nuts [Tree Nut] Shortness of Breath         Review of Systems   Review of Systems   All other systems reviewed and are negative. Physical Exam   Physical Exam   Constitutional: She is oriented to person, place, and time. She appears well-developed and well-nourished. HENT:   Head: Normocephalic and atraumatic. Mouth/Throat: Oropharynx is clear and moist.   Eyes: Pupils are equal, round, and reactive to light. Conjunctivae and EOM are normal.   Neck: Normal range of motion. Neck supple. No thyromegaly present. Cardiovascular: Normal rate, regular rhythm, normal heart sounds and intact distal pulses. No murmur heard. Pulmonary/Chest: Effort normal and breath sounds normal. No stridor. No respiratory distress. She has no wheezes. Abdominal: Soft. Bowel sounds are normal. There is no tenderness. Musculoskeletal: Normal range of motion. She exhibits no edema or tenderness. Lymphadenopathy:     She has no cervical adenopathy.    Neurological: She is alert and oriented to person, place, and time. She has normal reflexes. Skin: Skin is warm. Psychiatric: She has a normal mood and affect. Judgment normal.       Diagnostic Study Results     Labs -     Recent Results (from the past 12 hour(s))   CBC WITH AUTOMATED DIFF    Collection Time: 10/21/19 11:04 AM   Result Value Ref Range    WBC 8.4 3.6 - 11.0 K/uL    RBC 5.18 3.80 - 5.20 M/uL    HGB 12.9 11.5 - 16.0 g/dL    HCT 38.1 35.0 - 47.0 %    MCV 73.6 (L) 80.0 - 99.0 FL    MCH 24.9 (L) 26.0 - 34.0 PG    MCHC 33.9 30.0 - 36.5 g/dL    RDW 13.5 11.5 - 14.5 %    PLATELET 866 737 - 585 K/uL    MPV 10.5 8.9 - 12.9 FL    NRBC 0.0 0  WBC    ABSOLUTE NRBC 0.00 0.00 - 0.01 K/uL    NEUTROPHILS 47 32 - 75 %    LYMPHOCYTES 44 12 - 49 %    MONOCYTES 8 5 - 13 %    EOSINOPHILS 1 0 - 7 %    BASOPHILS 0 0 - 1 %    IMMATURE GRANULOCYTES 0 0.0 - 0.5 %    ABS. NEUTROPHILS 3.9 1.8 - 8.0 K/UL    ABS. LYMPHOCYTES 3.7 (H) 0.8 - 3.5 K/UL    ABS. MONOCYTES 0.7 0.0 - 1.0 K/UL    ABS. EOSINOPHILS 0.1 0.0 - 0.4 K/UL    ABS. BASOPHILS 0.0 0.0 - 0.1 K/UL    ABS. IMM.  GRANS. 0.0 0.00 - 0.04 K/UL    DF AUTOMATED     HCG URINE, QL    Collection Time: 10/21/19 11:04 AM   Result Value Ref Range    HCG urine, QL NEGATIVE  NEG     URINALYSIS W/ REFLEX CULTURE    Collection Time: 10/21/19 11:04 AM   Result Value Ref Range    Color YELLOW/STRAW      Appearance CLOUDY (A) CLEAR      Specific gravity 1.010 1.003 - 1.030      pH (UA) 7.0 5.0 - 8.0      Protein NEGATIVE  NEG mg/dL    Glucose NEGATIVE  NEG mg/dL    Ketone NEGATIVE  NEG mg/dL    Bilirubin NEGATIVE  NEG      Blood LARGE (A) NEG      Urobilinogen 1.0 0.2 - 1.0 EU/dL    Nitrites NEGATIVE  NEG      Leukocyte Esterase TRACE (A) NEG      WBC 0-4 0 - 4 /hpf    RBC  0 - 5 /hpf    Epithelial cells MODERATE (A) FEW /lpf    Bacteria 1+ (A) NEG /hpf    UA:UC IF INDICATED URINE CULTURE ORDERED (A) CNI         Radiologic Studies -   No orders to display     CT Results  (Last 48 hours)    None CXR Results  (Last 48 hours)    None            Medical Decision Making   I am the first provider for this patient. I reviewed the vital signs, available nursing notes, past medical history, past surgical history, family history and social history. Vital Signs-Reviewed the patient's vital signs. Patient Vitals for the past 12 hrs:   Temp Pulse Resp BP SpO2   10/21/19 1044 98.4 °F (36.9 °C) (!) 101 18 (!) 159/93 100 %       Records Reviewed: Nursing Notes    Provider Notes (Medical Decision Making): This time patient does not show any abnormalities with urinalysis however did capture epithelial cells I did ask patient if she would like to give us another sample or do potential straight catheter urine and she did refuse. She is not showing any current symptoms she has had previous urinary tract infections does not believe that this is symptomatic of urinary tract infection at this time at this time we will go ahead and forego any treatment for that. She did call the pharmacy and does state that she was on Premarin 0.2565 mg for her uterine bleeding. I did state that we will place her on a short term course of this however she does need follow-up with her gynecologist for further evaluation and or for medical refills. At this time vaginal exam was benign low clinical suspicion for any precursors for pelvic cancers and she does have recent Pap smear test within March that was mention as normal.  Without any family history I do believe that this is low on the suspicion. At this time will discharge patient in stable condition to follow-up on outpatient basis. As there is no pelvic pains, acute complications of pains I do have low clinical suspicion for any TOA or ovarian torsion. ED Course:   Initial assessment performed. The patients presenting problems have been discussed, and they are in agreement with the care plan formulated and outlined with them.   I have encouraged them to ask questions as they arise throughout their visit. Critical Care Time: None    Disposition:  Disposition for home    PLAN:  1. Current Discharge Medication List        2. Follow-up Information     Follow up With Specialties Details Why Contact Info    Gurwinder Griffith MD Internal Medicine   [address] [place]  [phone]          Return to ED if worse     Diagnosis     Clinical Impression:   1. DUB (dysfunctional uterine bleeding)          Please note that this dictation was completed with [employer], the computer voice recognition software. Quite often unanticipated grammatical, syntax, homophones, and other interpretive errors are inadvertently transcribed by the computer software. Please disregards these errors. Please excuse any errors that have escaped final proofreading. This note will not be viewable in [address]

## 2019-10-21 NOTE — ED NOTES
Pt for DC home and accepted DC data and med. Pt left unit steady gait. Patient (s)  given copy of dc instructions and 1 script(s). Patient (s)  verbalized understanding of instructions and script (s). Patient given a current medication reconciliation form and verbalized understanding of their medications. Patient (s) verbalized understanding of the importance of discussing medications with  his or her physician or clinic they will be following up with. Patient alert and oriented and in no acute distress. Patient discharged home ambulatory with self.

## 2019-10-22 LAB
BACTERIA SPEC CULT: NORMAL
CC UR VC: NORMAL
SERVICE CMNT-IMP: NORMAL

## 2020-02-17 ENCOUNTER — TELEPHONE (OUTPATIENT)
Dept: OBGYN CLINIC | Age: 35
End: 2020-02-17

## 2020-02-17 RX ORDER — DESOGESTREL AND ETHINYL ESTRADIOL 0.15-0.03
1 KIT ORAL DAILY
Qty: 1 DOSE PACK | Refills: 0 | Status: SHIPPED | OUTPATIENT
Start: 2020-02-17 | End: 2020-03-17

## 2020-02-17 NOTE — TELEPHONE ENCOUNTER
Please let patient know that I will refill her birth control pills once. She needs to have a follow up appointment at least made prior to any refills beyond this as she has not been seen since 3/2018. Thank you.

## 2020-04-20 RX ORDER — DESOGESTREL AND ETHINYL ESTRADIOL 0.15-0.03
KIT ORAL
Qty: 1 DOSE PACK | Refills: 1 | Status: SHIPPED | OUTPATIENT
Start: 2020-04-20 | End: 2021-02-11

## 2021-01-06 ENCOUNTER — OFFICE VISIT (OUTPATIENT)
Dept: FAMILY MEDICINE CLINIC | Age: 36
End: 2021-01-06
Payer: COMMERCIAL

## 2021-01-06 VITALS
BODY MASS INDEX: 33.75 KG/M2 | HEART RATE: 96 BPM | RESPIRATION RATE: 18 BRPM | TEMPERATURE: 98.1 F | SYSTOLIC BLOOD PRESSURE: 142 MMHG | HEIGHT: 66 IN | OXYGEN SATURATION: 98 % | DIASTOLIC BLOOD PRESSURE: 88 MMHG | WEIGHT: 210 LBS

## 2021-01-06 DIAGNOSIS — R09.81 NASAL CONGESTION: ICD-10-CM

## 2021-01-06 DIAGNOSIS — R42 DIZZINESS: ICD-10-CM

## 2021-01-06 DIAGNOSIS — E01.0 THYROMEGALY: Primary | ICD-10-CM

## 2021-01-06 DIAGNOSIS — Z13.220 SCREENING CHOLESTEROL LEVEL: ICD-10-CM

## 2021-01-06 DIAGNOSIS — I10 ESSENTIAL HYPERTENSION: ICD-10-CM

## 2021-01-06 PROCEDURE — 99204 OFFICE O/P NEW MOD 45 MIN: CPT | Performed by: STUDENT IN AN ORGANIZED HEALTH CARE EDUCATION/TRAINING PROGRAM

## 2021-01-06 RX ORDER — FLUTICASONE PROPIONATE 50 MCG
2 SPRAY, SUSPENSION (ML) NASAL DAILY
Qty: 1 BOTTLE | Refills: 1 | Status: SHIPPED | OUTPATIENT
Start: 2021-01-06 | End: 2021-02-01

## 2021-01-06 RX ORDER — MECLIZINE HYDROCHLORIDE 25 MG/1
25 TABLET ORAL
Qty: 30 TAB | Refills: 0 | Status: SHIPPED | OUTPATIENT
Start: 2021-01-06 | End: 2021-01-16

## 2021-01-06 NOTE — PROGRESS NOTES
Name and  Verified. Chief Complaint   Patient presents with    New Patient   [address]     Previous PCP's were [facility], notes can be seen in [address]. Willi Physician recommended patient see a PCP to have thyroid checked she has swelling in neck. 1. Have you been to the ER, urgent care clinic since your last visit? Hospitalized since your last visit? No    2. Have you seen or consulted any other health care providers outside of the [address] [place] [address] since your last visit? Include any pap smears or colon screening.         Willi Dolan  CPE/ For Employment  2020

## 2021-01-06 NOTE — PROGRESS NOTES
Chief Complaint: Enlarged thyroid, Dizziness and HTN    Subjective  Sulyivory Block is a [address] [place] female who presents for evaluation of the above. Has not been seen at this practice for over 3 years. Went to [facility] for acute visits. 1) Enlarged Thyroid:  Pt had her yearly work physical recently and was told to see her pcp for enlarged thyroid gland. No known history of thyroid problems in her family. Endorses feeling constantly \"hot\". No constipation, abd pain, visual problems, headache, palpitation, n/v. Pt reports gaining some weight which she attributes to being home since the covid pandemic. 2) Dizziness;  States that she has intermittent dizziness which is associated with nasal congestion about once every year. [facility]. Currently without any dizziness. But has nasal congestion. No cough, fever, chills, headache, n/v.     3) HTN: Has a h/o HTN and was on Lisinopril in the past. Pt stopped taking the medications a while ago as she reports losing some weight. Occupation:  for [employer]  Smokin-3 cigarettes per day. marital status: Single and no children    Allergies - reviewed: Allergies   Allergen Reactions    Nuts [Tree Nut] Shortness of Breath         Medications - reviewed:   Current Outpatient Medications   Medication Sig    fluticasone propionate (FLONASE) 50 mcg/actuation nasal spray 2 Sprays by Both Nostrils route daily.  meclizine (ANTIVERT) 25 mg tablet Take 1 Tab by mouth three (3) times daily as needed for Dizziness for up to 10 days.  loratadine (CLARITIN) 10 mg tablet Take 1 Tab by mouth daily.  VITAMIN B COMPLEX PO Take  by mouth.  Apri 0.15-0.03 mg tab TAKE 1 TABLET BY MOUTH EVERY DAY    naproxen (NAPROSYN) 500 mg tablet Take 1 Tab by mouth every twelve (12) hours as needed for Pain.     desogestrel-ethinyl estradiol (DESOGEN) 0.15-0.03 mg tab Take 1 Tab by mouth daily. No current facility-administered medications for this visit. Family History - reviewed:  Family History   Problem Relation Age of Onset    Hypertension Mother     Migraines Mother     Cancer Maternal Grandmother         brain tumor         Past Medical History - reviewed:  Past Medical History:   Diagnosis Date    Headache     Hypertension        Review of Systems:  A comprehensive review of systems was negative except for that written in the History of Present Illness. Physical Exam  Visit Vitals  BP (!) 142/88 (BP 1 Location: Right arm, BP Patient Position: Sitting)   Pulse 96   Temp 98.1 °F (36.7 °C) (Temporal)   Resp 18   Ht 5' 6\" (1.676 m)   Wt 210 lb (95.3 kg)   LMP 11/01/2020   SpO2 98%   BMI 33.89 kg/m²       General: Alert and oriented, in no acute distress. Well nourished. SKIN: No rash. No suspicious lesions or moles. EYE: PERRL. Sclera and conjuctival clear. Extraocular movements intact. EARS: External normal, canals clear, tympanic membranes normal.   NOSE: Mild nasal congestion  OROPHARYNX: No suspicious lesions, normal dentition, pharynx, tongue and tonsils normal.  NECK: Enlarged right thyroid gland measuring approximately 10 cm. LYMPH NODES: No significant cervial lymphadenopathy. LUNGS: Respirations unlabored; clear to auscultation bilaterally. CARDIOVASCULAR: Regular, rate, and rhythm without murmurs, gallops or rubs. ABDOMEN: Soft; nontender; nondistended; normoactive bowel sounds; no masses or organomegaly. MUSCULOSKELETAL: FROM in all extremities     EXT: No edema. Neurovascularlly intact. Normal gait. Neurological: Alert and oriented X 3, normal strength and tone. Normal symmetric reflexes. Normal coordination and gait       Assessment/Plan    ICD-10-CM ICD-9-CM    1. Thyromegaly  E01.0 240.9 US THYROID/PARATHYROID/SOFT TISS      REFERRAL TO ENDOCRINOLOGY      TSH REFLEX TO T4      METABOLIC PANEL, BASIC   2.  Dizziness  R42 780.4 meclizine (ANTIVERT) 25 mg tablet   3. Nasal congestion  R09.81 478.19 fluticasone propionate (FLONASE) 50 mcg/actuation nasal spray   4. Essential hypertension  I10 401.9    5. Screening cholesterol level  Z13.220 V77.91 LIPID PANEL   6. BMI 33.0-33.9,adult  Z68.33 V85.33 HEMOGLOBIN A1C WITH EAG       1. Thyromegaly  Unclear etiology. - US THYROID/PARATHYROID/SOFT TISS; Future  - REFERRAL TO ENDOCRINOLOGY  - TSH REFLEX TO T4  - METABOLIC PANEL, BASIC    2. Dizziness  Currently asymptomatic  - Pocket script for meclizine should she becomes symptomatic    3. Nasal congestion    - fluticasone propionate (FLONASE) 50 mcg/actuation nasal spray; 2 Sprays by Both Nostrils route daily. 4. Essential hypertension  Still mildly elevated. Pt not ready for any medications  - counseled on lifestyle changes, diet and exercise  - will monitor and follow up with patient     5. Screening cholesterol level  - LIPID PANEL    6. BMI 33.0-33.9,adult  - HEMOGLOBIN A1C WITH EAG    Follow up: 4-6 weeks    We discussed the expected course, resolution and complications of the diagnosis(es) in detail. Medication risks, benefits, costs, interactions, and alternatives were discussed as indicated. I advised him to contact the office if his condition worsens, changes or fails to improve as anticipated. He expressed understanding with the diagnosis(es) and plan. Patient understands that this encounter was a temporary measure, and the importance of further follow up and examination was emphasized. Patient verbalized understanding.       Signed By: Oj Barreto MD     January 6, 2021

## 2021-01-14 LAB
BUN SERPL-MCNC: 6 MG/DL (ref 6–20)
BUN/CREAT SERPL: 14 (ref 9–23)
CALCIUM SERPL-MCNC: 9.1 MG/DL (ref 8.7–10.2)
CHLORIDE SERPL-SCNC: 105 MMOL/L (ref 96–106)
CHOLEST SERPL-MCNC: 119 MG/DL (ref 100–199)
CO2 SERPL-SCNC: 22 MMOL/L (ref 20–29)
CREAT SERPL-MCNC: 0.44 MG/DL (ref 0.57–1)
EST. AVERAGE GLUCOSE BLD GHB EST-MCNC: 85 MG/DL
GLUCOSE SERPL-MCNC: 100 MG/DL (ref 65–99)
HBA1C MFR BLD: 4.6 % (ref 4.8–5.6)
HDLC SERPL-MCNC: 50 MG/DL
LDLC SERPL CALC-MCNC: 48 MG/DL (ref 0–99)
POTASSIUM SERPL-SCNC: 3.7 MMOL/L (ref 3.5–5.2)
SODIUM SERPL-SCNC: 139 MMOL/L (ref 134–144)
TRIGL SERPL-MCNC: 117 MG/DL (ref 0–149)
TSH SERPL DL<=0.005 MIU/L-ACNC: <0.005 UIU/ML (ref 0.45–4.5)
VLDLC SERPL CALC-MCNC: 21 MG/DL (ref 5–40)

## 2021-01-14 NOTE — PROGRESS NOTES
Reviewed. Severely suppressed TSH. No free T4 results  Other results look ok. I called patient and was sent to [facility]. Left vm for pt to call me back. Will send [employer] message. Pt needs to see Endocrine.

## 2021-01-20 ENCOUNTER — HOSPITAL ENCOUNTER (OUTPATIENT)
Dept: ULTRASOUND IMAGING | Age: 36
Discharge: HOME | End: 2021-01-20
Attending: STUDENT IN AN ORGANIZED HEALTH CARE EDUCATION/TRAINING PROGRAM
Payer: COMMERCIAL

## 2021-01-20 DIAGNOSIS — E01.0 THYROMEGALY: ICD-10-CM

## 2021-01-20 PROCEDURE — 76536 US EXAM OF HEAD AND NECK: CPT

## 2021-01-28 DIAGNOSIS — R09.81 NASAL CONGESTION: ICD-10-CM

## 2021-02-01 RX ORDER — FLUTICASONE PROPIONATE 50 MCG
SPRAY, SUSPENSION (ML) NASAL
Qty: 1 BOTTLE | Refills: 1 | Status: SHIPPED | OUTPATIENT
Start: 2021-02-01 | End: 2022-07-27 | Stop reason: ALTCHOICE

## 2021-02-11 ENCOUNTER — VIRTUAL VISIT (OUTPATIENT)
Dept: ENDOCRINOLOGY | Age: 36
End: 2021-02-11
Payer: COMMERCIAL

## 2021-02-11 DIAGNOSIS — E01.0 THYROMEGALY: Primary | ICD-10-CM

## 2021-02-11 DIAGNOSIS — E01.0 THYROMEGALY: ICD-10-CM

## 2021-02-11 PROCEDURE — 99204 OFFICE O/P NEW MOD 45 MIN: CPT | Performed by: INTERNAL MEDICINE

## 2021-02-11 NOTE — PROGRESS NOTES
Chief Complaint   Patient presents with    New Patient     [facility]    Thyroid Problem    Other     [facility]      History of Present Illness: Bishnu Fernandez is a 37 y.o. female with a past medical hx significant for HTN, headaches presenting in referral from Cayla Nava MD for a low TSH. Went to have a physical in December who noticed she had a large thyroid. Reports that she's never noticed thyroid enlargement until December. Works as schoolbus . Notes tremor, has had that for a few months. Thought she was just nervous. Has gained 40 lbs- since September- before this started was 145, now 185 - is 6\"6. Just started taking biotin 1 week ago- is taking 1 2500mcg of biotin. Was taking B and C complex vitamins- b12. No iodine. No CT scans with contrast. Does endorse some pain when turns to the left, numbness in the R cheek. Chokes on liquids. Is interested in pursuing fertility. Going back to school on Feb 22nd- end of March. Experienced an ectopic pregnancy 12 years ago and has been trying to conceive for the past 5 years without success. Has seen fertility specialists regarding this. Menstrual periods are very irregular. Is actively pursuing fertility at this time. Past Medical History:   Diagnosis Date    Goiter     Headache     Hypertension      Past Surgical History:   Procedure Laterality Date    HX GYN      ectopic pregnancy - lapartomy     Current Outpatient Medications   Medication Sig    fluticasone propionate (FLONASE) 50 mcg/actuation nasal spray SPRAY 2 SPRAYS INTO EACH NOSTRIL EVERY DAY     No current facility-administered medications for this visit.       Allergies   Allergen Reactions    Nuts [Tree Nut] Anaphylaxis and Shortness of Breath     Family History   Problem Relation Age of Onset    Hypertension Mother     Migraines Mother     Cancer Maternal Grandmother         brain tumor       Social Hx: living alone,  for [employer]    Review of Systems:  See HPI    - Physical Examination:  - - GENERAL: Renetta Abts well nourished   - EYES: EOMI, non-icteric, no proptosis   - Ear/Nose/Throat: Thyromegaly is present  - CARDIOVASCULAR: no cyanosis, no visible JVD   - RESPIRATORY: respiratory effort normal without any distress or labored breathing   - MUSCULOSKELETAL: Normal ROM of neck and upper extremities observed   - SKIN: No rash on face  - NEUROLOGIC:  No facial asymmetry (Cranial nerve 7 motor function), No gaze palsy   - PSYCHIATRIC: Normal affect, Normal insight and judgement     Data Reviewed:   Results for Marcin Cervantes (MRN [id number]) as of 2/11/2021 13:51   Ref. Range 10/31/2013 10:03 4/6/2016 10:50 12/12/2016 10:35 1/13/2021 11:31   TSH Latest Ref Range: 0.450 - 4.500 uIU/mL 0.931 1.030  <0.005 (L)         Assessment/Plan: This is a very pleasant 68-year-old female presenting in referral from Toi Nava MD for discussion related to an enlarged thyroid and a very low TSH. Thyroid ultrasound reveals a heterogeneous, hypervascular thyroid. Clinical history is significant for a tremor and weight gain of 40 pounds. Will hold biotin for 3 days and reassess TSH, free T4, total T3, thyroid-stimulating immunoglobulin levels. I suspect that this is Graves' disease. Discussed the fact that both methimazole and propylthiouracil are associated with congenital birth defects and for this reason we prefer to perform thyroidectomy prior to active pursuit of fertility. Will obtain serum hCG prior to initiation of methimazole. Thyroiditis is also in the differential but currently there is no pain in the thyroid.     #Hyperthyroidism, thyromegaly  -Suspect Graves' disease  -Obtain thyroid-stimulating immunoglobulin levels  -Hold biotin for 3 days, reassess TSH, free T4, total T3  -Anticipate initiation of methimazole, discussed rare but real risk of agranulocytosis and hepatotoxicity associated with this medication  -Obtain beta-hCG prior to initiation of methimazole  -Discussed the risk of congenital birth defects with methimazole and propylthiouracil  -Anticipate achieving euthyroidism and then discussing thyroidectomy prior to conception    Copy sent to:Yolanda Loya MD      Return to care March 17th 12:10    Dimitri Olivera is a 37 y.o. female being evaluated by a Virtual Visit (video visit) encounter to address concerns as mentioned above. A caregiver was present when appropriate. Due to this being a TeleHealth encounter (During [facility]-13 public [facility] emergency), evaluation of the following organ systems was limited:     Vitals/Constitutional/EENT/Resp/CV/GI//MS/Neuro/Skin/Heme-Lymph-Imm. Pursuant to the emergency declaration under the [address] [place] [address], [address] [place] [address] authority and the [employer] Act, this Virtual Visit was conducted with patient's (and/or legal guardian's) consent, to reduce the risk of exposure to COVID-19 and provide necessary medical care. Services were provided through a video synchronous discussion virtually to substitute for in-person encounter. --Nicholas Silverman MD on 2/11/2021 at 1:39 PM    An electronic signature was used to authenticate this note. Ирина

## 2021-02-11 NOTE — PROGRESS NOTES
Lab Results   Component Value Date/Time    TSH <0.005 (L) 01/13/2021 11:31 AM    TSH 1.030 04/06/2016 10:50 AM

## 2021-02-17 ENCOUNTER — DOCUMENTATION ONLY (OUTPATIENT)
Dept: ENDOCRINOLOGY | Age: 36
End: 2021-02-17

## 2021-02-17 DIAGNOSIS — E05.00 GRAVES DISEASE: Primary | ICD-10-CM

## 2021-02-17 DIAGNOSIS — E05.00 GRAVES DISEASE: ICD-10-CM

## 2021-02-17 LAB
ALBUMIN SERPL-MCNC: 3.9 G/DL (ref 3.8–4.8)
ALBUMIN/GLOB SERPL: 1.3 {RATIO} (ref 1.2–2.2)
ALP SERPL-CCNC: 187 IU/L (ref 39–117)
ALT SERPL-CCNC: 15 IU/L (ref 0–32)
AST SERPL-CCNC: 15 IU/L (ref 0–40)
BASOPHILS # BLD AUTO: 0 X10E3/UL (ref 0–0.2)
BASOPHILS NFR BLD AUTO: 0 %
BILIRUB SERPL-MCNC: 0.3 MG/DL (ref 0–1.2)
BUN SERPL-MCNC: 5 MG/DL (ref 6–20)
BUN/CREAT SERPL: 11 (ref 9–23)
CALCIUM SERPL-MCNC: 9.1 MG/DL (ref 8.7–10.2)
CHLORIDE SERPL-SCNC: 109 MMOL/L (ref 96–106)
CO2 SERPL-SCNC: 18 MMOL/L (ref 20–29)
CREAT SERPL-MCNC: 0.47 MG/DL (ref 0.57–1)
EOSINOPHIL # BLD AUTO: 0.2 X10E3/UL (ref 0–0.4)
EOSINOPHIL NFR BLD AUTO: 2 %
ERYTHROCYTE [DISTWIDTH] IN BLOOD BY AUTOMATED COUNT: 13.9 % (ref 11.7–15.4)
GLOBULIN SER CALC-MCNC: 2.9 G/DL (ref 1.5–4.5)
GLUCOSE SERPL-MCNC: 86 MG/DL (ref 65–99)
HCT VFR BLD AUTO: 39.9 % (ref 34–46.6)
HGB BLD-MCNC: 13.1 G/DL (ref 11.1–15.9)
IMM GRANULOCYTES # BLD AUTO: 0 X10E3/UL (ref 0–0.1)
IMM GRANULOCYTES NFR BLD AUTO: 0 %
LYMPHOCYTES # BLD AUTO: 2.9 X10E3/UL (ref 0.7–3.1)
LYMPHOCYTES NFR BLD AUTO: 40 %
MCH RBC QN AUTO: 24.2 PG (ref 26.6–33)
MCHC RBC AUTO-ENTMCNC: 32.8 G/DL (ref 31.5–35.7)
MCV RBC AUTO: 74 FL (ref 79–97)
MONOCYTES # BLD AUTO: 1 X10E3/UL (ref 0.1–0.9)
MONOCYTES NFR BLD AUTO: 13 %
NEUTROPHILS # BLD AUTO: 3.2 X10E3/UL (ref 1.4–7)
NEUTROPHILS NFR BLD AUTO: 45 %
PLATELET # BLD AUTO: 233 X10E3/UL (ref 150–450)
POTASSIUM SERPL-SCNC: 4 MMOL/L (ref 3.5–5.2)
PROT SERPL-MCNC: 6.8 G/DL (ref 6–8.5)
RBC # BLD AUTO: 5.41 X10E6/UL (ref 3.77–5.28)
SODIUM SERPL-SCNC: 142 MMOL/L (ref 134–144)
T3 SERPL-MCNC: 522 NG/DL (ref 71–180)
T4 FREE SERPL-MCNC: 5.06 NG/DL (ref 0.82–1.77)
TSH SERPL DL<=0.005 MIU/L-ACNC: <0.005 UIU/ML (ref 0.45–4.5)
TSI SER-ACNC: 20.4 IU/L (ref 0–0.55)
WBC # BLD AUTO: 7.2 X10E3/UL (ref 3.4–10.8)

## 2021-02-17 RX ORDER — METHIMAZOLE 10 MG/1
20 TABLET ORAL 2 TIMES DAILY
Qty: 120 TAB | Refills: 2 | Status: SHIPPED | OUTPATIENT
Start: 2021-02-17 | End: 2021-03-03 | Stop reason: SDUPTHER

## 2021-02-17 NOTE — PROGRESS NOTES
Labs confirm Grave's, start 40mg MMI. Take pregnancy test before she starts.     Brandt Ronquillo [facility]

## 2021-03-03 ENCOUNTER — OFFICE VISIT (OUTPATIENT)
Dept: FAMILY MEDICINE CLINIC | Age: 36
End: 2021-03-03
Payer: COMMERCIAL

## 2021-03-03 VITALS
HEART RATE: 97 BPM | HEIGHT: 66 IN | RESPIRATION RATE: 16 BRPM | SYSTOLIC BLOOD PRESSURE: 153 MMHG | OXYGEN SATURATION: 97 % | DIASTOLIC BLOOD PRESSURE: 101 MMHG | BODY MASS INDEX: 33.46 KG/M2 | WEIGHT: 208.2 LBS | TEMPERATURE: 98.7 F

## 2021-03-03 DIAGNOSIS — I10 ESSENTIAL HYPERTENSION: Primary | ICD-10-CM

## 2021-03-03 DIAGNOSIS — E05.00 GRAVES DISEASE: ICD-10-CM

## 2021-03-03 PROCEDURE — 99214 OFFICE O/P EST MOD 30 MIN: CPT | Performed by: STUDENT IN AN ORGANIZED HEALTH CARE EDUCATION/TRAINING PROGRAM

## 2021-03-03 RX ORDER — LISINOPRIL AND HYDROCHLOROTHIAZIDE 10; 12.5 MG/1; MG/1
1 TABLET ORAL DAILY
Qty: 90 TAB | Refills: 0 | Status: SHIPPED | OUTPATIENT
Start: 2021-03-03 | End: 2021-05-05

## 2021-03-03 RX ORDER — METHIMAZOLE 10 MG/1
10 TABLET ORAL
COMMUNITY
End: 2021-03-19 | Stop reason: SDUPTHER

## 2021-03-03 NOTE — PROGRESS NOTES
Name and  Verified. Chief Complaint   Patient presents with    Follow-up     Thyromegaly/ Hypertension       1. Have you been to the ER, urgent care clinic since your last visit? Hospitalized since your last visit? 2. Have you seen or consulted any other health care providers outside of the [address] [place] [address] since your last visit? Include any pap smears or colon screening.      Yes  Endo  2021   Jonh Pyle MD    Thyromegaly

## 2021-03-03 NOTE — PROGRESS NOTES
[address]  [phone]    Chief Complaint: HTN and Graves Disease    Subjective  Geovany Man is a 37 y.o. female who presents for evaluation of the above. 1) HTN: Has a h/o HTN and was on Lisinopril in the past. Pt stopped taking the medications a while ago as she reports losing some weight. At last visit, pt did not still want to be on any meds. States that BP remains high at home despite conservative approach. Open to meds at this time. 2) Graves Disease:  Recently confirmed to have Graves Dx by Dr. Vladimir Lynch. Pt started on MM 10mg daily about 2 weeks now. Follow up with Endo in 2 weeks (3/17/21)    No abd pain, visual problems, headache, palpitation, n/v.     Other Social Hx:  Occupation:  for [employer]  Smokin-3 cigarettes per day. marital status: Single and no children    Allergies - reviewed: Allergies   Allergen Reactions    Nuts [Tree Nut] Anaphylaxis and Shortness of Breath         Medications - reviewed:   Current Outpatient Medications   Medication Sig    methIMAzole (Tapazole) 10 mg tablet Take 10 mg by mouth. Take 2 tablets by mouth two (2) times a day    lisinopril-hydroCHLOROthiazide (PRINZIDE, ZESTORETIC) 10-12.5 mg per tablet Take 1 Tab by mouth daily.  fluticasone propionate (FLONASE) 50 mcg/actuation nasal spray SPRAY 2 SPRAYS INTO EACH NOSTRIL EVERY DAY     No current facility-administered medications for this visit. Family History - reviewed:  Family History   Problem Relation Age of Onset    Hypertension Mother     Migraines Mother     Cancer Maternal Grandmother         brain tumor         Past Medical History - reviewed:  Past Medical History:   Diagnosis Date    Goiter     Headache     Hypertension        Review of Systems:  A comprehensive review of systems was negative except for that written in the History of Present Illness.        Physical Exam  Visit Vitals  BP (!) 153/101 (BP 1 Location: Left upper arm, BP Patient Position: Sitting, BP Cuff Size: Adult)   Pulse 97   Temp 98.7 °F (37.1 °C) (Temporal)   Resp 16   Ht 5' 6\" (1.676 m)   Wt 208 lb 3.2 oz (94.4 kg)   LMP 02/11/2021   SpO2 97%   BMI 33.60 kg/m²       General: Alert and oriented, in no acute distress. Well nourished. SKIN: No rash. No suspicious lesions or moles. EYE: PERRL. Sclera and conjuctival clear. Extraocular movements intact. EARS: External normal, canals clear, tympanic membranes normal.   NOSE: Mild nasal congestion  OROPHARYNX: No suspicious lesions, normal dentition, pharynx, tongue and tonsils normal.  NECK: Enlarged right thyroid gland measuring approximately 10 cm. LYMPH NODES: No significant cervial lymphadenopathy. LUNGS: Respirations unlabored; clear to auscultation bilaterally. CARDIOVASCULAR: Regular, rate, and rhythm without murmurs, gallops or rubs. ABDOMEN: Soft; nontender; nondistended; normoactive bowel sounds; no masses or organomegaly. Assessment/Plan    ICD-10-CM ICD-9-CM    1. Essential hypertension  I10 401.9 lisinopril-hydroCHLOROthiazide (PRINZIDE, ZESTORETIC) 10-12.5 mg per tablet   2. Graves disease  E05.00 242.00      1. Essential hypertension: Worsening BP.   - Starting pt on lisinopril-hydroCHLOROthiazide (PRINZIDE, ZESTORETIC) 10-12.5 mg per tablet; Take 1 Tab by mouth daily. .  - Keep BP log. Can check BP at least 3 times per week  - Limit alcohol intake to less than 2 drinks daily   - Encouraged to avoid tobacco products  - Avoid excess caffeine, energy drinks, NSAIDs, decongestants (such as Sudafed, Pseudoephedrine, phenylephrine, and Afrin) and stimulants   - Focus on regular exercise (150 minutes each week) and healthy eating. Eat more fruits, vegetables, protein (egg whites, beans, and nuts you know you tolerate) and less carbohydrates (white bread, white rice, white pasta, white potatoes, sodas, and sweets).   Eat appropriately small portion sizes. 2. Graves disease  Continue taking Methimazole 10mg daily. Follow up with Dr. Kody Valdez in 2 weeks (3/17/2021)    We discussed the expected course, resolution and complications of the diagnosis(es) in detail. Medication risks, benefits, costs, interactions, and alternatives were discussed as indicated. I advised him to contact the office if his condition worsens, changes or fails to improve as anticipated. He expressed understanding with the diagnosis(es) and plan. Patient understands that this encounter was a temporary measure, and the importance of further follow up and examination was emphasized. Patient verbalized understanding.         Follow up 8-12 weeks for HTN    Signed By: Juice Bourne MD     March 3, 2021

## 2021-03-12 ENCOUNTER — HOSPITAL ENCOUNTER (EMERGENCY)
Age: 36
Discharge: HOME | End: 2021-03-12
Attending: EMERGENCY MEDICINE
Payer: COMMERCIAL

## 2021-03-12 ENCOUNTER — APPOINTMENT (OUTPATIENT)
Dept: GENERAL RADIOLOGY | Age: 36
End: 2021-03-12
Attending: EMERGENCY MEDICINE
Payer: COMMERCIAL

## 2021-03-12 VITALS
RESPIRATION RATE: 16 BRPM | HEART RATE: 87 BPM | BODY MASS INDEX: 33.02 KG/M2 | WEIGHT: 205.47 LBS | TEMPERATURE: 98.3 F | OXYGEN SATURATION: 98 % | HEIGHT: 66 IN | SYSTOLIC BLOOD PRESSURE: 122 MMHG | DIASTOLIC BLOOD PRESSURE: 82 MMHG

## 2021-03-12 DIAGNOSIS — S92.515A CLOSED NONDISPLACED FRACTURE OF PROXIMAL PHALANX OF LESSER TOE OF LEFT FOOT, INITIAL ENCOUNTER: Primary | ICD-10-CM

## 2021-03-12 PROCEDURE — 73660 X-RAY EXAM OF TOE(S): CPT

## 2021-03-12 PROCEDURE — 96372 THER/PROPH/DIAG INJ SC/IM: CPT

## 2021-03-12 PROCEDURE — 74011000250 HC RX REV CODE- 250: Performed by: EMERGENCY MEDICINE

## 2021-03-12 PROCEDURE — 99283 EMERGENCY DEPT VISIT LOW MDM: CPT

## 2021-03-12 RX ORDER — BUPIVACAINE HYDROCHLORIDE 5 MG/ML
10 INJECTION, SOLUTION EPIDURAL; INTRACAUDAL ONCE
Status: COMPLETED | OUTPATIENT
Start: 2021-03-12 | End: 2021-03-12

## 2021-03-12 RX ORDER — TRAMADOL HYDROCHLORIDE 50 MG/1
50 TABLET ORAL
Qty: 20 TAB | Refills: 0 | Status: SHIPPED | OUTPATIENT
Start: 2021-03-12 | End: 2021-03-15

## 2021-03-12 RX ADMIN — BUPIVACAINE HYDROCHLORIDE 50 MG: 5 INJECTION, SOLUTION EPIDURAL; INTRACAUDAL; PERINEURAL at 00:53

## 2021-03-12 NOTE — LETTER
[address]  [facility] EMERGENCY DEPT  [address] [place] [id number] [phone]    Work/School Note    Date: 3/12/2021    To Whom It May concern:    Martin Wilson was seen and treated today in the emergency room by the following provider(s):  Attending Provider: Edi Marrufo MD  Physician Assistant: Linda Freitas. Please excuse Martin Wilson from work today.       Sincerely,          Linda Lora

## 2021-03-12 NOTE — ED PROVIDER NOTES
EMERGENCY DEPARTMENT HISTORY AND PHYSICAL EXAM     ------------------------------------------------------------------------------------------------------  Please note that this dictation was completed with [employer], the [employer] voice recognition software. Quite often unanticipated grammatical, syntax, homophones, and other interpretive errors are inadvertently transcribed by the computer software. Please disregard these errors. Please excuse any errors that have escaped final proofreading.  -----------------------------------------------------------------------------------------------------------------    Date: 3/12/2021  Patient Name: Ap Obrien    History of Presenting Illness     Chief Complaint   Patient presents with    Toe Pain     Patient was walking around her daughter's bed and caught the bed frame. She states that her toe is dislocated and she states that she has no feeling in it. History Provided By: Patient    HPI: Ap Obrien is a 37 y.o. female, with significant pmhx of HTN and thyroid issues, who presents via private vehicle to the ED with c/o left 4th toe pain. She notes she was walking around a bed and accidentally hit her toe on the frame. She had immediate onset of pain and numbness. Since then the feeling has returned but the pain has been constant, non-radiating and worsens with ambulation. She states that immediately after the injury her toe was pointing to the side but since putting her shoe on to come to the ED it appears straighter. There was no treatment PTA. She denies injuries to her let 4th toe in the past.     Pt also specifically denies any recent fevers, chills, CP, SOB, nausea, vomiting, diarrhea, abd pain, changes in BM, urinary sxs, or headache. PCP: Silvano Noyola MD    Social Hx: smokes 1/4 ppd, social EtOH, denies Illicit Drugs     There are no other complaints, changes, or physical findings at this time.      Allergies   Allergen Reactions    Nuts [Tree Nut] Anaphylaxis and Shortness of Breath         Current Outpatient Medications   Medication Sig Dispense Refill    traMADoL (Ultram) 50 mg tablet Take 1 Tab by mouth every six (6) hours as needed for Pain for up to 3 days. Max Daily Amount: 200 mg. 20 Tab 0    methIMAzole (Tapazole) 10 mg tablet Take 10 mg by mouth. Take 2 tablets by mouth two (2) times a day      lisinopril-hydroCHLOROthiazide (PRINZIDE, ZESTORETIC) 10-12.5 mg per tablet Take 1 Tab by mouth daily. 90 Tab 0    fluticasone propionate (FLONASE) 50 mcg/actuation nasal spray SPRAY 2 SPRAYS INTO EACH NOSTRIL EVERY DAY 1 Bottle 1       Past History     Past Medical History:  Past Medical History:   Diagnosis Date    Goiter     Headache     Hypertension        Past Surgical History:  Past Surgical History:   Procedure Laterality Date    HX GYN      ectopic pregnancy - lapartomy       Family History:  Family History   Problem Relation Age of Onset    Hypertension Mother    [address] [facility] Migraines Mother     Cancer Maternal Grandmother         brain tumor       Social History:  Social History     Tobacco Use    Smoking status: Current Every Day Smoker     Packs/day: 0.25    Smokeless tobacco: Never Used    Tobacco comment: 2-3 cigarettes/day   Substance Use Topics    Alcohol use: Yes     Alcohol/week: 1.0 standard drinks     Types: 1 Glasses of wine per week     Frequency: Monthly or less     Drinks per session: 1 or 2     Binge frequency: Never     Comment: occassionally    Drug use: No       Allergies: Allergies   Allergen Reactions    Nuts [Tree Nut] Anaphylaxis and Shortness of Breath         Review of Systems   Review of Systems   Constitutional: Negative for chills, diaphoresis and fever. HENT: Negative for congestion, ear pain, rhinorrhea and sore throat. Respiratory: Negative for cough and shortness of breath. Cardiovascular: Negative for chest pain.    Gastrointestinal: Negative for abdominal pain, constipation, diarrhea, nausea and vomiting. Genitourinary: Negative for difficulty urinating, dysuria, frequency and hematuria. Musculoskeletal: Positive for arthralgias and gait problem. Negative for myalgias. Neurological: Negative for headaches. All other systems reviewed and are negative. Physical Exam   Physical Exam  Vitals signs and nursing note reviewed. Constitutional:       General: She is not in acute distress. Appearance: She is well-developed. She is not diaphoretic. Comments: 37 y.o. -American female    HENT:      Head: Normocephalic and atraumatic. Eyes:      General:         Right eye: No discharge. Left eye: No discharge. Conjunctiva/sclera: Conjunctivae normal.   Neck:      Musculoskeletal: Normal range of motion and neck supple. Cardiovascular:      Rate and Rhythm: Normal rate and regular rhythm. Pulses: Normal pulses. Pulmonary:      Effort: Pulmonary effort is normal.   Musculoskeletal:      Comments: L FOOT: Swelling, tenderness and slight deformity to the 4th toe. NT to palpation of the remainder of the toes and foot. Distal NV intact. Cap refill < 2 sec. Palpable pedal pulses. Ambulatory with limp. Skin:     General: Skin is warm and dry. Neurological:      Mental Status: She is alert and oriented to person, place, and time. Psychiatric:         Behavior: Behavior normal.           Diagnostic Study Results     Labs - none    Radiologic Studies -   XR 4TH TOE LT MIN 2 V   Final Result   Acute nondisplaced extra-articular fracture of the fourth digit   proximal phalanx. Medical Decision Making   I am the first provider for this patient. I reviewed the vital signs, available nursing notes, past medical history, past surgical history, family history and social history. Vital Signs-Reviewed the patient's vital signs.   Patient Vitals for the past 12 hrs:   Temp Pulse Resp BP SpO2   03/12/21 0124 98.3 °F (36.8 °C) 87 16 122/82 98 %   03/12/21 0018 98.1 °F (36.7 °C) 92 16 (!) 135/94 98 %   03/12/21 0010 98 °F (36.7 °C) 91 16 (!) 155/97 100 %       Pulse Oximetry Analysis - 100 % on RA    Records Reviewed/Interpretted: Nursing Notes from triage and Old Medical Records  The patient has not been seen in the ED for the past 2 years. Provider Notes (Medical Decision Making):     DDX:  Fracture, sprain, strain, dislocation     Plan:  Henry tape, post operative shoe, pain medications, RICE    Impression:  Fracture to the left 4th proximal phalanx    ED Course:   Initial assessment performed. The patients presenting problems have been discussed, and they are in agreement with the care plan formulated and outlined with them. I have encouraged them to ask questions as they arise throughout their visit. I reviewed our electronic medical record system for any past medical records that were available that may contribute to the patients current condition, the nursing notes and and vital signs from today's visit  Nursing notes will be reviewed as they become available in realtime while the pt has been in the ED. DERECK Camacho       1:26 AM   TOBACCO COUNSELING:  During evaluation pt reported that they are a current tobacco user. I have spent 3 minutes discussing the medical risks of prolonged smoking habits and advised the patient of the benefits of the cessation of smoking, providing specific suggestions on how to quit. Pt has been counseled and encouraged to quit as soon as possible in order to decrease further risks to their health. Pt has conveyed their understanding of the risks involved should they continue to use tobacco products. Maricarmen Cooper MD    I personally reviewed/interpreted pt's imaging. Agree with official read by radiology as noted above. Maricarmen Cooper MD    1:25 AM  Procedure Note - Splinting:    Performed by DERECK Camacho . The patients left 3rd and 4th toes were henry taped using Coban.    Neurovascular exam was intact following the procedure. The procedure was tolerated well. 1:40 AM   Progress note:  Pt noted to be feeling better, ready for discharge. Discussed imaging findings with pt, specifically noting a toe fracture. Pt will follow up with orthopedics as instructed. All questions have been answered, pt voiced understanding and agreement with plan. Specific return precautions provided in addition to instructions for pt to return to the ED immediately should sx worsen at any time. Enoch Saeed [facility]              Critical Care Time:     none      Diagnosis     Clinical Impression:   1. Closed nondisplaced fracture of proximal phalanx of lesser toe of left foot, initial encounter        PLAN:  1. Current Discharge Medication List      START taking these medications    Details   traMADoL (Ultram) 50 mg tablet Take 1 Tab by mouth every six (6) hours as needed for Pain for up to 3 days. Max Daily Amount: 200 mg. Qty: 20 Tab, Refills: 0    Associated Diagnoses: Closed nondisplaced fracture of proximal phalanx of lesser toe of left foot, initial encounter           2. Follow-up Information     Follow up With Specialties Details Why Contact Info    Harsh Hebert MD Orthopedic Surgery In 1 week  [facility] [address],[address]  [address] [facility]  [phone]        3. Buddy tape as discussed  4. Case shoe as discussed   Return to ED if worse     Disposition:  1:40 AM    The patient's results have been reviewed with family and/or caregiver. They verbally convey their understanding and agreement of the patient's signs, symptoms, diagnosis, treatment and prognosis and additionally agree to follow up as recommended in the discharge instructions or to return to the Emergency Room should the patient's condition change prior to their follow-up appointment. The family and/or caregiver verbally agrees with the care-plan and all of their questions have been answered.  The discharge instructions have also been provided to the them with educational information regarding the patient's diagnosis as well a list of reasons why the patient would want to return to the ER prior to their follow-up appointment should their condition change.   Linda Braxton

## 2021-03-17 ENCOUNTER — VIRTUAL VISIT (OUTPATIENT)
Dept: ENDOCRINOLOGY | Age: 36
End: 2021-03-17
Payer: COMMERCIAL

## 2021-03-17 DIAGNOSIS — Z31.69 ENCOUNTER FOR PRECONCEPTION CONSULTATION: ICD-10-CM

## 2021-03-17 DIAGNOSIS — E05.00 GRAVES DISEASE: Primary | ICD-10-CM

## 2021-03-17 PROCEDURE — 99244 OFF/OP CNSLTJ NEW/EST MOD 40: CPT | Performed by: INTERNAL MEDICINE

## 2021-03-17 NOTE — PROGRESS NOTES
Chief Complaint   Patient presents with    Thyroid Problem     Mychart    Follow-up     History of Present Illness: Hattie Chilel is a 37 y.o. female with a past medical hx significant for HTN, headaches presenting in referral from Linden Nava MD for a low TSH. Went to have a physical in December who noticed she had a large thyroid. Reports that she's never noticed thyroid enlargement until December. Works as schoolbus . Notes tremor, has had that for a few months. Thought she was just nervous. Has gained 40 lbs- since September- before this started was 145, now 185 - is 6\"6. Just started taking biotin 1 week ago- is taking 1 2500mcg of biotin. Was taking B and C complex vitamins- b12. No iodine. No CT scans with contrast. Does endorse some pain when turns to the left, numbness in the R cheek. Chokes on liquids. Is interested in pursuing fertility. Going back to school on Feb 22nd- end of March. Experienced an ectopic pregnancy 12 years ago and has been trying to conceive for the past 5 years without success. Has seen fertility specialists regarding this. Menstrual periods are very irregular. Is actively pursuing fertility at this time. 03/17/2021: Has been taking 4 a day- has been doing that since I sent in rx. Doesn't hurt when she moves her hand, still has a little shake. It is not as bad. Still forgets everything. PCP recently started lisinopril/hctz- was not told number. 153/101 in the office- does not check at home. Current Outpatient Medications   Medication Sig    methIMAzole (Tapazole) 10 mg tablet Take 10 mg by mouth. Take 2 tablets by mouth two (2) times a day    lisinopril-hydroCHLOROthiazide (PRINZIDE, ZESTORETIC) 10-12.5 mg per tablet Take 1 Tab by mouth daily.  fluticasone propionate (FLONASE) 50 mcg/actuation nasal spray SPRAY 2 SPRAYS INTO EACH NOSTRIL EVERY DAY     No current facility-administered medications for this visit.       Social Hx: living alone,  for [employer] [address] [employer]:  See HPI    - Physical Examination:  - - GENERAL: Melina Zelaya well nourished   - EYES: EOMI, non-icteric, no proptosis   - Ear/Nose/Throat: Thyromegaly is present  - CARDIOVASCULAR: no cyanosis, no visible JVD   - RESPIRATORY: respiratory effort normal without any distress or labored breathing   - MUSCULOSKELETAL: Normal ROM of neck and upper extremities observed   - SKIN: No rash on face  - NEUROLOGIC:  No facial asymmetry (Cranial nerve 7 motor function), No gaze palsy   - PSYCHIATRIC: Normal affect, Normal insight and judgement     Data Reviewed:   Results for Alberto Adame (MRN [id number]) as of 2/11/2021 13:51   Ref. Range 10/31/2013 10:03 4/6/2016 10:50 12/12/2016 10:35 1/13/2021 11:31   TSH Latest Ref Range: 0.450 - 4.500 uIU/mL 0.931 1.030  <0.005 (L)         Assessment/Plan: This is a very pleasant 74-year-old female presenting in follow-up for discussion related to an enlarged thyroid and a very low TSH. Thyroid ultrasound reveals a heterogeneous, hypervascular thyroid. Clinical history is significant for a tremor and weight gain of 40 pounds. Labs have confirmed Graves' disease. She has been taking methimazole 20 mg twice daily for 1 month. We will repeat thyroid function tests. Anticipate switching over to PTU prior to attempting to conceive. Discussed the risk of congenital defects with both PTU and methimazole, specifically aplasia cutis. Looking into details of the 14 cases, 7 children were diagnosed with a birth defect in the face and neck region (preauricular and branchial sinus/fistula/cyst) and 7 children had a birth defect in the urinary system (single cyst of kidney and hydronephrosis).      #Graves' disease  -continue methimazole 20mg BID  -repeat CBC/CMP/thyroid function tests to down-titrate methimazole  -Discussed the risk of congenital birth defects with methimazole and propylthiouracil  -Anticipate achieving euthyroidism and then discussing thyroidectomy and or switching to PTU prior to conception  -recommended she ask PCP for alternative BP med given interest in pursuing fertility    Our offices are located at:    [address] [place] [address] (TWO), 3rd floor, [address] [facility]. Jennifer Meeks [address] [facility], [address]  [phone] (phone)  [phone] (fax)    [facility]   Address: Malinda Jim[address]nez. LUCIANA Giraldo, [address] [facility], [address] [place] [address]  Phone- [phone]      Copy sent to:Charu Loya MD      Return to care April 22nd at 1:30     Daphnie Abraham is a 37 y.o. female being evaluated by a Virtual Visit (video visit) encounter to address concerns as mentioned above. A caregiver was present when appropriate. Due to this being a TeleHealth encounter (During XSDCB-19 public health emergency), evaluation of the following organ systems was limited:     Vitals/Constitutional/EENT/Resp/CV/GI//MS/Neuro/Skin/Heme-Lymph-Imm. Pursuant to the emergency declaration under the [facility] [address] [address] authority and the [employer] and [employer] Act, this Virtual Visit was conducted with patient's (and/or legal guardian's) consent, to reduce the risk of exposure to COVID-19 and provide necessary medical care. Services were provided through a video synchronous discussion virtually to substitute for in-person encounter. --Juan Ontiveros MD on 3/17/2021 at 1:39 PM    An electronic signature was used to authenticate this note.

## 2021-03-19 DIAGNOSIS — E05.00 GRAVES DISEASE: Primary | ICD-10-CM

## 2021-03-19 DIAGNOSIS — E05.00 GRAVES DISEASE: ICD-10-CM

## 2021-03-19 LAB
ALBUMIN SERPL-MCNC: 4.2 G/DL (ref 3.8–4.8)
ALBUMIN/GLOB SERPL: 1.7 {RATIO} (ref 1.2–2.2)
ALP SERPL-CCNC: 174 IU/L (ref 39–117)
ALT SERPL-CCNC: 14 IU/L (ref 0–32)
AST SERPL-CCNC: 20 IU/L (ref 0–40)
BASOPHILS # BLD AUTO: 0 X10E3/UL (ref 0–0.2)
BASOPHILS NFR BLD AUTO: 1 %
BILIRUB SERPL-MCNC: 0.3 MG/DL (ref 0–1.2)
BUN SERPL-MCNC: 6 MG/DL (ref 6–20)
BUN/CREAT SERPL: 13 (ref 9–23)
CALCIUM SERPL-MCNC: 8.7 MG/DL (ref 8.7–10.2)
CHLORIDE SERPL-SCNC: 107 MMOL/L (ref 96–106)
CO2 SERPL-SCNC: 20 MMOL/L (ref 20–29)
CREAT SERPL-MCNC: 0.48 MG/DL (ref 0.57–1)
EOSINOPHIL # BLD AUTO: 0.1 X10E3/UL (ref 0–0.4)
EOSINOPHIL NFR BLD AUTO: 2 %
ERYTHROCYTE [DISTWIDTH] IN BLOOD BY AUTOMATED COUNT: 13.7 % (ref 11.7–15.4)
GLOBULIN SER CALC-MCNC: 2.5 G/DL (ref 1.5–4.5)
GLUCOSE SERPL-MCNC: 92 MG/DL (ref 65–99)
HCT VFR BLD AUTO: 41.2 % (ref 34–46.6)
HGB BLD-MCNC: 13.4 G/DL (ref 11.1–15.9)
IMM GRANULOCYTES # BLD AUTO: 0 X10E3/UL (ref 0–0.1)
IMM GRANULOCYTES NFR BLD AUTO: 0 %
LYMPHOCYTES # BLD AUTO: 2.6 X10E3/UL (ref 0.7–3.1)
LYMPHOCYTES NFR BLD AUTO: 41 %
MCH RBC QN AUTO: 24.5 PG (ref 26.6–33)
MCHC RBC AUTO-ENTMCNC: 32.5 G/DL (ref 31.5–35.7)
MCV RBC AUTO: 75 FL (ref 79–97)
MONOCYTES # BLD AUTO: 0.5 X10E3/UL (ref 0.1–0.9)
MONOCYTES NFR BLD AUTO: 8 %
NEUTROPHILS # BLD AUTO: 3 X10E3/UL (ref 1.4–7)
NEUTROPHILS NFR BLD AUTO: 48 %
PLATELET # BLD AUTO: 238 X10E3/UL (ref 150–450)
POTASSIUM SERPL-SCNC: 3.8 MMOL/L (ref 3.5–5.2)
PROT SERPL-MCNC: 6.7 G/DL (ref 6–8.5)
RBC # BLD AUTO: 5.47 X10E6/UL (ref 3.77–5.28)
SODIUM SERPL-SCNC: 140 MMOL/L (ref 134–144)
T3 SERPL-MCNC: 207 NG/DL (ref 71–180)
T4 FREE SERPL-MCNC: 1.6 NG/DL (ref 0.82–1.77)
WBC # BLD AUTO: 6.2 X10E3/UL (ref 3.4–10.8)

## 2021-03-19 RX ORDER — METHIMAZOLE 10 MG/1
10 TABLET ORAL 2 TIMES DAILY
Qty: 180 TAB | Refills: 0
Start: 2021-03-19 | End: 2021-04-14 | Stop reason: ALTCHOICE

## 2021-04-10 LAB
ALBUMIN SERPL-MCNC: 4.2 G/DL (ref 3.8–4.8)
ALBUMIN/GLOB SERPL: 1.4 {RATIO} (ref 1.2–2.2)
ALP SERPL-CCNC: 185 IU/L (ref 39–117)
ALT SERPL-CCNC: 18 IU/L (ref 0–32)
AST SERPL-CCNC: 14 IU/L (ref 0–40)
BILIRUB SERPL-MCNC: 0.3 MG/DL (ref 0–1.2)
BUN SERPL-MCNC: 5 MG/DL (ref 6–20)
BUN/CREAT SERPL: 8 (ref 9–23)
CALCIUM SERPL-MCNC: 8.8 MG/DL (ref 8.7–10.2)
CHLORIDE SERPL-SCNC: 105 MMOL/L (ref 96–106)
CO2 SERPL-SCNC: 21 MMOL/L (ref 20–29)
CREAT SERPL-MCNC: 0.59 MG/DL (ref 0.57–1)
GLOBULIN SER CALC-MCNC: 3.1 G/DL (ref 1.5–4.5)
GLUCOSE SERPL-MCNC: 89 MG/DL (ref 65–99)
POTASSIUM SERPL-SCNC: 3.7 MMOL/L (ref 3.5–5.2)
PROT SERPL-MCNC: 7.3 G/DL (ref 6–8.5)
SODIUM SERPL-SCNC: 138 MMOL/L (ref 134–144)
T3 SERPL-MCNC: 168 NG/DL (ref 71–180)
T4 FREE SERPL-MCNC: 0.97 NG/DL (ref 0.82–1.77)

## 2021-04-14 DIAGNOSIS — E05.00 GRAVES DISEASE: ICD-10-CM

## 2021-04-14 DIAGNOSIS — E05.00 GRAVES DISEASE: Primary | ICD-10-CM

## 2021-04-14 RX ORDER — PROPYLTHIOURACIL 50 MG/1
50 TABLET ORAL 3 TIMES DAILY
Qty: 270 TAB | Refills: 1 | Status: SHIPPED | OUTPATIENT
Start: 2021-04-14 | End: 2021-05-18 | Stop reason: ALTCHOICE

## 2021-04-14 NOTE — PROGRESS NOTES
Cosmo Fuentes,    Thyroid function tests are normal on 20mg twice daily of methimazole. I'm going to drop the dose of methimazole down to 10mg daily but convert it over to PTU (propylthiouracil), a medication that's safer to use during pregnancy. PTU 50mg three times daily is equivalent to 10mg of methimazole. Please discontinue methimazole when you start PTU. We will repeat labs 2 weeks following this change.     Dr. Bipin Cochran

## 2021-04-15 ENCOUNTER — TELEPHONE (OUTPATIENT)
Dept: ENDOCRINOLOGY | Age: 36
End: 2021-04-15

## 2021-04-15 NOTE — TELEPHONE ENCOUNTER
----- Message from Luis Alfredo Torres MD sent at 4/14/2021  7:20 AM EDT -----  Please call this pt and make sure she read the [employer] message re switching from methimazole to PTU.    TY!

## 2021-04-22 ENCOUNTER — VIRTUAL VISIT (OUTPATIENT)
Dept: ENDOCRINOLOGY | Age: 36
End: 2021-04-22
Payer: COMMERCIAL

## 2021-04-22 DIAGNOSIS — E05.00 GRAVES DISEASE: Primary | ICD-10-CM

## 2021-04-22 PROCEDURE — 99214 OFFICE O/P EST MOD 30 MIN: CPT | Performed by: INTERNAL MEDICINE

## 2021-04-22 NOTE — PROGRESS NOTES
Chief Complaint   Patient presents with    Thyroid Problem     History of Present Illness: Melina Edmond is a 37 y.o. female with a past medical hx significant for HTN, headaches presenting in referral from Anh Nava MD for a low TSH. Went to have a physical in December who noticed she had a large thyroid. Reports that she's never noticed thyroid enlargement until December. Works as schoolbus . Notes tremor, has had that for a few months. Thought she was just nervous. Has gained 40 lbs- since September- before this started was 145, now 185 - is 6\"6. Just started taking biotin 1 week ago- is taking 1 2500mcg of biotin. Was taking B and C complex vitamins- b12. No iodine. No CT scans with contrast. Does endorse some pain when turns to the left, numbness in the R cheek. Chokes on liquids. Is interested in pursuing fertility. Going back to school on Feb 22nd- end of March. Experienced an ectopic pregnancy 12 years ago and has been trying to conceive for the past 5 years without success. Has seen fertility specialists regarding this. Menstrual periods are very irregular. Is actively pursuing fertility at this time. 03/17/2021: Has been taking 4 a day- has been doing that since I sent in rx. Doesn't hurt when she moves her hand, still has a little shake. It is not as bad. Still forgets everything. PCP recently started lisinopril/hctz- was not told number. 153/101 in the office- does not check at home. 04/22/2021: Switched over to PTU 04/14, repeat labs are due May 3rd. Boyfriend has semena nalysis pending next week. Current Outpatient Medications   Medication Sig    propylthiouraciL (PTU) 50 mg tablet Take 1 Tab by mouth three (3) times daily. DISCONTINUE METHIMAZOLE.  lisinopril-hydroCHLOROthiazide (PRINZIDE, ZESTORETIC) 10-12.5 mg per tablet Take 1 Tab by mouth daily.     fluticasone propionate (FLONASE) 50 mcg/actuation nasal spray SPRAY 2 SPRAYS INTO EACH NOSTRIL EVERY DAY     No current facility-administered medications for this visit. Social Hx: living alone,  for Juan Carlos Aguirre [address] [employer]:  See HPI    - Physical Examination:  - - GENERAL: NCAT, Appears well nourished   - EYES: EOMI, non-icteric, no proptosis   - Ear/Nose/Throat: Thyromegaly is present  - CARDIOVASCULAR: no cyanosis, no visible JVD   - RESPIRATORY: respiratory effort normal without any distress or labored breathing   - MUSCULOSKELETAL: Normal ROM of neck and upper extremities observed   - SKIN: No rash on face  - NEUROLOGIC:  No facial asymmetry (Cranial nerve 7 motor function), No gaze palsy   - PSYCHIATRIC: Normal affect, Normal insight and judgement     Data Reviewed:               Assessment/Plan: This is a very pleasant 79-year-old female presenting in follow-up for discussion related to an enlarged thyroid and a very low TSH. Thyroid ultrasound reveals a heterogeneous, hypervascular thyroid. Clinical history is significant for a tremor and weight gain of 40 pounds. Labs have confirmed Graves' disease. She has been taking methimazole 20 mg twice daily for 1 month. With this dose, labs normalized. I then switched her over to PTU in anticipation of conception on April 16th. Discussed the risk of congenital defects with both PTU and methimazole, specifically aplasia cutis. Looking into details of the 14 cases, 7 children were diagnosed with a birth defect in the face and neck region (preauricular and branchial sinus/fistula/cyst) and 7 children had a birth defect in the urinary system (single cyst of kidney and hydronephrosis). We will repeat labs 2 weeks after switching from methimazole to PTU, attempt to lower PTU dose to allow for high normal thyroid function tests. Recommend resuming folic acid supplement.     #Graves' disease  -Methimazole 20 mg twice daily switched to PTU 50 mg 3 times daily on April 16, reassess thyroid function tests 2 weeks later  -Discussed the risk of congenital birth defects with methimazole and propylthiouracil (currently taking lisinopril HCTZ0  -recommended she ask PCP for alternative BP med given interest in pursuing fertility    Our offices are located at:    [address] [place] [address] (TWO), 3rd floor, [address] [facility]. [address] [facility], [address] [place] [address]  [phone] (phone)  [phone] (fax)    [facility]   Address: Whitney Prader. LUCIANA Giraldo, [address]  Phone- [phone]      Copy sent to:Namita Loya MD      Return to care June 28th at [address] [facility] Ra Russell is a 37 y.o. female being evaluated by a Virtual Visit (video visit) encounter to address concerns as mentioned above. A caregiver was present when appropriate. Due to this being a TeleHealth encounter (During BOIBU-13 public health emergency), evaluation of the following organ systems was limited:     Vitals/Constitutional/EENT/Resp/CV/GI//MS/Neuro/Skin/Heme-Lymph-Imm. Pursuant to the emergency declaration under the [facility] [address] authority and the [employer] Act, this Virtual Visit was conducted with patient's (and/or legal guardian's) consent, to reduce the risk of exposure to COVID-19 and provide necessary medical care. Services were provided through a video synchronous discussion virtually to substitute for in-person encounter. --Everardo Baird MD on 4/22/2021 at 1:39 PM    An electronic signature was used to authenticate this note.

## 2021-04-22 NOTE — PROGRESS NOTES
Identified pt with two pt identifiers(name and ). Reviewed record in preparation for visit and have obtained necessary documentation. Chief Complaint   Patient presents with    Thyroid Problem        There were no vitals filed for this visit. Health Maintenance Due   Topic    Hepatitis C Screening     Pneumococcal 0-64 years (1 of 1 - PPSV23)    COVID-19 Vaccine (1)    PAP AKA CERVICAL CYTOLOGY        Coordination of Care Questionnaire:  :   1) Have you been to an emergency room, urgent care, or hospitalized since your last visit? If yes, where when, and reason for visit? No      2. Have seen or consulted any other health care provider since your last visit? If yes, where when, and reason for visit? No      Mychart pt for her virtual appt.

## 2021-05-03 DIAGNOSIS — E05.00 GRAVES DISEASE: ICD-10-CM

## 2021-05-05 ENCOUNTER — OFFICE VISIT (OUTPATIENT)
Dept: FAMILY MEDICINE CLINIC | Age: 36
End: 2021-05-05
Payer: COMMERCIAL

## 2021-05-05 VITALS
RESPIRATION RATE: 16 BRPM | TEMPERATURE: 98.2 F | WEIGHT: 211 LBS | BODY MASS INDEX: 33.91 KG/M2 | DIASTOLIC BLOOD PRESSURE: 88 MMHG | OXYGEN SATURATION: 96 % | HEART RATE: 97 BPM | SYSTOLIC BLOOD PRESSURE: 130 MMHG | HEIGHT: 66 IN

## 2021-05-05 DIAGNOSIS — I10 ESSENTIAL HYPERTENSION: Primary | ICD-10-CM

## 2021-05-05 PROCEDURE — 99213 OFFICE O/P EST LOW 20 MIN: CPT | Performed by: STUDENT IN AN ORGANIZED HEALTH CARE EDUCATION/TRAINING PROGRAM

## 2021-05-05 RX ORDER — LISINOPRIL AND HYDROCHLOROTHIAZIDE 12.5; 2 MG/1; MG/1
1 TABLET ORAL DAILY
Qty: 90 TAB | Refills: 3 | Status: SHIPPED | OUTPATIENT
Start: 2021-05-05 | End: 2022-04-26

## 2021-05-05 NOTE — PROGRESS NOTES
[address]  [address] F[address]ZA[address] Mckenzie Current. [place], [address] [place] [address]  [phone]    Chief Complaint: HTN     Subjective  Ricardo Murcia is a 37 y.o. female who presents for evaluation of the above. 1) HTN:   Currently on Prinzide. Compliant with meds. Denies any side effects. Does not check BP at home. Other Social Hx:  Occupation:  for [employer]  Smokin-3 cigarettes per day. marital status: Single and no children    Allergies - reviewed: Allergies   Allergen Reactions    Nuts [Tree Nut] Anaphylaxis and Shortness of Breath         Medications - reviewed:   Current Outpatient Medications   Medication Sig    lisinopril-hydroCHLOROthiazide (PRINZIDE, ZESTORETIC) 20-12.5 mg per tablet Take 1 Tab by mouth daily.  propylthiouraciL (PTU) 50 mg tablet Take 1 Tab by mouth three (3) times daily. DISCONTINUE METHIMAZOLE.  fluticasone propionate (FLONASE) 50 mcg/actuation nasal spray SPRAY 2 SPRAYS INTO EACH NOSTRIL EVERY DAY     No current facility-administered medications for this visit. Family History - reviewed:  Family History   Problem Relation Age of Onset    Hypertension Mother     Migraines Mother     Cancer Maternal Grandmother         brain tumor         Past Medical History - reviewed:  Past Medical History:   Diagnosis Date    Goiter     Headache     Hypertension        Review of Systems:  A comprehensive review of systems was negative except for that written in the History of Present Illness. Physical Exam  Visit Vitals  /88 (BP 1 Location: Right upper arm, BP Patient Position: Sitting, BP Cuff Size: Adult)   Pulse 97   Temp 98.2 °F (36.8 °C) (Temporal)   Resp 16   Ht 5' 6\" (1.676 m)   Wt 211 lb (95.7 kg)   LMP 2021   SpO2 96%   BMI 34.06 kg/m²       General: Alert and oriented, in no acute distress. Well nourished. LUNGS: Respirations unlabored; clear to auscultation bilaterally.   CARDIOVASCULAR: Regular, rate, and rhythm without murmurs, gallops or rubs. Assessment/Plan    ICD-10-CM ICD-9-CM    1. Essential hypertension  I10 401.9 lisinopril-hydroCHLOROthiazide (PRINZIDE, ZESTORETIC) 20-12.5 mg per tablet     1. Essential hypertension:   Borderline. Will increase the Lisinopril to 20mg. - lisinopril-hydroCHLOROthiazide (PRINZIDE, ZESTORETIC) 20-12.5 mg per tablet; Take 1 Tab by mouth daily.    - Keep BP log. Can check BP at least 3 times per week  - Limit alcohol intake to less than 2 drinks daily   - Encouraged to avoid tobacco products  - Avoid excess caffeine, energy drinks, NSAIDs, decongestants (such as Sudafed, Pseudoephedrine, phenylephrine, and Afrin) and stimulants   - Focus on regular exercise (150 minutes each week) and healthy eating. Eat more fruits, vegetables, protein (egg whites, beans, and nuts you know you tolerate) and less carbohydrates (white bread, white rice, white pasta, white potatoes, sodas, and sweets). Eat appropriately small portion sizes. Follow up: 6months    We discussed the expected course, resolution and complications of the diagnosis(es) in detail. Medication risks, benefits, costs, interactions, and alternatives were discussed as indicated. I advised him to contact the office if his condition worsens, changes or fails to improve as anticipated. He expressed understanding with the diagnosis(es) and plan. Patient understands that this encounter was a temporary measure, and the importance of further follow up and examination was emphasized. Patient verbalized understanding.         Follow up 8-12 weeks for HTN    Signed By: Cleophus Romberg, MD     May 5, 2021

## 2021-05-05 NOTE — PROGRESS NOTES
Name and  Verified. Chief Complaint   Patient presents with    Follow-up     8-12 week/ Hypertension       1. Have you been to the ER, urgent care clinic since your last visit? Hospitalized since your last visit? No    2. Have you seen or consulted any other health care providers outside of the [address] [place] [address] since your last visit? Include any pap smears or colon screening.  No    Health Maintenance Due   Topic Date Due    Hepatitis C Screening  Never done    Pneumococcal 0-64 years (1 of 1 - PPSV23) Never done    COVID-19 Vaccine (1) Never done    PAP AKA CERVICAL CYTOLOGY  2018

## 2021-05-18 DIAGNOSIS — E05.00 GRAVES DISEASE: Primary | ICD-10-CM

## 2021-05-18 LAB
ALBUMIN SERPL-MCNC: 4.3 G/DL (ref 3.8–4.8)
ALBUMIN/GLOB SERPL: 1.5 {RATIO} (ref 1.2–2.2)
ALP SERPL-CCNC: 183 IU/L (ref 48–121)
ALT SERPL-CCNC: 13 IU/L (ref 0–32)
AST SERPL-CCNC: 14 IU/L (ref 0–40)
BILIRUB SERPL-MCNC: 0.3 MG/DL (ref 0–1.2)
BUN SERPL-MCNC: 9 MG/DL (ref 6–20)
BUN/CREAT SERPL: 16 (ref 9–23)
CALCIUM SERPL-MCNC: 9.2 MG/DL (ref 8.7–10.2)
CHLORIDE SERPL-SCNC: 101 MMOL/L (ref 96–106)
CO2 SERPL-SCNC: 24 MMOL/L (ref 20–29)
CREAT SERPL-MCNC: 0.57 MG/DL (ref 0.57–1)
GLOBULIN SER CALC-MCNC: 2.9 G/DL (ref 1.5–4.5)
GLUCOSE SERPL-MCNC: 80 MG/DL (ref 65–99)
POTASSIUM SERPL-SCNC: 3.6 MMOL/L (ref 3.5–5.2)
PROT SERPL-MCNC: 7.2 G/DL (ref 6–8.5)
SODIUM SERPL-SCNC: 138 MMOL/L (ref 134–144)
T3 SERPL-MCNC: 283 NG/DL (ref 71–180)
T4 FREE SERPL-MCNC: 1.19 NG/DL (ref 0.82–1.77)

## 2021-05-18 RX ORDER — PROPYLTHIOURACIL 50 MG/1
75 TABLET ORAL 3 TIMES DAILY
Qty: 270 TAB | Refills: 1 | Status: SHIPPED | OUTPATIENT
Start: 2021-05-18 | End: 2021-06-15 | Stop reason: SDUPTHER

## 2021-05-18 NOTE — PROGRESS NOTES
See my-chart message and or results release.      Increase PTU from 50mg TID to 75mg TID due to elevated Total [address] [facility], [facility] [place] [facility]

## 2021-06-01 DIAGNOSIS — E05.00 GRAVES DISEASE: ICD-10-CM

## 2021-06-12 LAB
ALBUMIN SERPL-MCNC: 4.1 G/DL (ref 3.8–4.8)
ALBUMIN/GLOB SERPL: 1.3 {RATIO} (ref 1.2–2.2)
ALP SERPL-CCNC: 169 IU/L (ref 48–121)
ALT SERPL-CCNC: 11 IU/L (ref 0–32)
AST SERPL-CCNC: 16 IU/L (ref 0–40)
BILIRUB SERPL-MCNC: 0.3 MG/DL (ref 0–1.2)
BUN SERPL-MCNC: 5 MG/DL (ref 6–20)
BUN/CREAT SERPL: 9 (ref 9–23)
CALCIUM SERPL-MCNC: 9.4 MG/DL (ref 8.7–10.2)
CHLORIDE SERPL-SCNC: 102 MMOL/L (ref 96–106)
CO2 SERPL-SCNC: 24 MMOL/L (ref 20–29)
CREAT SERPL-MCNC: 0.57 MG/DL (ref 0.57–1)
GLOBULIN SER CALC-MCNC: 3.1 G/DL (ref 1.5–4.5)
GLUCOSE SERPL-MCNC: 84 MG/DL (ref 65–99)
POTASSIUM SERPL-SCNC: 3.5 MMOL/L (ref 3.5–5.2)
PROT SERPL-MCNC: 7.2 G/DL (ref 6–8.5)
SODIUM SERPL-SCNC: 138 MMOL/L (ref 134–144)
T3 SERPL-MCNC: 438 NG/DL (ref 71–180)
T4 FREE SERPL-MCNC: 1.88 NG/DL (ref 0.82–1.77)

## 2021-06-15 DIAGNOSIS — E05.00 GRAVES DISEASE: Primary | ICD-10-CM

## 2021-06-15 RX ORDER — PROPYLTHIOURACIL 50 MG/1
100 TABLET ORAL 3 TIMES DAILY
Qty: 270 TABLET | Refills: 1 | Status: SHIPPED | OUTPATIENT
Start: 2021-06-15 | End: 2021-09-09

## 2021-06-15 NOTE — LETTER
6/15/2021    Ms. [address] [facility]      Dear Ricardo Murcia:    Please find your most recent results below. Resulted Orders   METABOLIC PANEL, COMPREHENSIVE   Result Value Ref Range    Glucose 84 65 - 99 mg/dL    BUN 5 (L) 6 - 20 mg/dL    Creatinine 0.57 0.57 - 1.00 mg/dL    GFR est non- >59 mL/min/1.73    GFR est  >59 mL/min/1.73    BUN/Creatinine ratio 9 9 - 23    Sodium 138 134 - 144 mmol/L    Potassium 3.5 3.5 - 5.2 mmol/L    Chloride 102 96 - 106 mmol/L    CO2 24 20 - 29 mmol/L    Calcium 9.4 8.7 - 10.2 mg/dL    Protein, total 7.2 6.0 - 8.5 g/dL    Albumin 4.1 3.8 - 4.8 g/dL    GLOBULIN, TOTAL 3.1 1.5 - 4.5 g/dL    A-G Ratio 1.3 1.2 - 2.2    Bilirubin, total 0.3 0.0 - 1.2 mg/dL    Alk. phosphatase 169 (H) 48 - 121 IU/L    AST (SGOT) 16 0 - 40 IU/L    ALT (SGPT) 11 0 - 32 IU/L    Narrative    Performed at:  [address]  [id number]  : Parth Medina MD, Phone:  [phone]   T4, FREE   Result Value Ref Range    T4, Free 1.88 (H) 0.82 - 1.77 ng/dL    Narrative    Performed at:  [address]  [id number]  : Parth Medina MD, Phone:  [phone]   T3 TOTAL   Result Value Ref Range    T3, total 438 (H) 71 - 180 ng/dL    Narrative    Performed at:  [address]  [id number]  : Parth Medina MD, Phone:  [phone]       RECOMMENDATIONS:    Lorena Carmen,    Your thyroid function tests have increased with the switch from methimazole to PTU. Please increase the PTU dose from 1.5 tablets 3x daily to 2 tablets 3x daily. Please let me know if you are NOT taking 1.5 tablets 3x daily currently or have been missing doses and we will need to change the plan. We will repeat labs in 1 month.     Please call me if you have any questions: [phone]    Sincerely,      Maria Isabel Boyd MD

## 2021-06-15 NOTE — LETTER
6/17/2021 4:59 PM 
 
MsKrystal Andres Aparicio [address] [facility] Faye Zuniga E [address] [facility] [address] Sincerely, 
 
 
Michelle Ellison MD

## 2021-06-15 NOTE — H&P
See letter regarding lab results. MD [facility]     Increasing PTU to 100mg TID from 75mg TID.

## 2021-06-28 ENCOUNTER — VIRTUAL VISIT (OUTPATIENT)
Dept: ENDOCRINOLOGY | Age: 36
End: 2021-06-28
Payer: COMMERCIAL

## 2021-06-28 DIAGNOSIS — E05.00 GRAVES DISEASE: ICD-10-CM

## 2021-06-28 DIAGNOSIS — E05.00 GRAVES DISEASE: Primary | ICD-10-CM

## 2021-06-28 PROCEDURE — 99214 OFFICE O/P EST MOD 30 MIN: CPT | Performed by: INTERNAL MEDICINE

## 2021-06-28 NOTE — PROGRESS NOTES
Lab Results   Component Value Date/Time    TSH <0.005 (L) 02/16/2021 11:43 AM    T4, Free 1.88 (H) 06/11/2021 10:27 AM

## 2021-06-28 NOTE — PROGRESS NOTES
Chief Complaint   Patient presents with    Thyroid Problem     Mychart    Follow-up    Other     [facility]     History of Present Illness: Pk Velarde is a 37 y.o. female with a past medical hx significant for HTN, headaches presenting in referral from Ekaterina Nava MD for a low TSH. Went to have a physical in December who noticed she had a large thyroid. Reports that she's never noticed thyroid enlargement until December. Works as schoolbus . Notes tremor, has had that for a few months. Thought she was just nervous. Has gained 40 lbs- since September- before this started was 145, now 185 - is 6\"6. Just started taking biotin 1 week ago- is taking 1 2500mcg of biotin. Was taking B and C complex vitamins- b12. No iodine. No CT scans with contrast. Does endorse some pain when turns to the left, numbness in the R cheek. Chokes on liquids. Is interested in pursuing fertility. Going back to school on Feb 22nd- end of March. Experienced an ectopic pregnancy 12 years ago and has been trying to conceive for the past 5 years without success. Has seen fertility specialists regarding this. Menstrual periods are very irregular. Is actively pursuing fertility at this time. 03/17/2021: Has been taking 4 a day- has been doing that since I sent in rx. Doesn't hurt when she moves her hand, still has a little shake. It is not as bad. Still forgets everything. PCP recently started lisinopril/hctz- was not told number. 153/101 in the office- does not check at home. 04/22/2021: Switched over to PTU 04/14, repeat labs are due May 3rd. Boyfriend has semena nalysis pending next week.     06/28/2021:Hasn't been taking med 3x daily- got back on track last week. Forgot to email me back. Went back to repro when she was on a cycle- waiting of boyfriend to do the semen analysis, labs.      Current Outpatient Medications   Medication Sig    propylthiouraciL (PTU) 50 mg tablet Take 2 Tablets by mouth three (3) times daily for 90 days.  lisinopril-hydroCHLOROthiazide (PRINZIDE, ZESTORETIC) 20-12.5 mg per tablet Take 1 Tab by mouth daily.  fluticasone propionate (FLONASE) 50 mcg/actuation nasal spray SPRAY 2 SPRAYS INTO EACH NOSTRIL EVERY DAY     No current facility-administered medications for this visit. Social Hx: living alone,  for Juan [employer] Aguirre [address]:  See HPI    - Physical Examination:  - - GENERAL: NCAT, Appears well nourished   - EYES: EOMI, non-icteric, no proptosis   - Ear/Nose/Throat: Thyromegaly is present  - CARDIOVASCULAR: no cyanosis, no visible JVD   - RESPIRATORY: respiratory effort normal without any distress or labored breathing   - MUSCULOSKELETAL: Normal ROM of neck and upper extremities observed   - SKIN: No rash on face  - NEUROLOGIC:  No facial asymmetry (Cranial nerve 7 motor function), No gaze palsy   - PSYCHIATRIC: Normal affect, Normal insight and judgement     Data Reviewed:               Assessment/Plan: This is a very pleasant 61-year-old female presenting in follow-up for discussion related to an enlarged thyroid and a very low TSH. Thyroid ultrasound old a heterogeneous, hypervascular thyroid. Clinical history is significant for a tremor and weight gain of 40 pounds. Labs have confirmed Graves' disease. We initially started her on methimazole 20 mg twice daily for 1 month. With this dose, labs normalized. I then switched her over to PTU in anticipation of conception on April 16th. With the increase in dosing from twice daily to 3 times daily, she missed some doses. I subsequently increased the dose not knowing that she had missed doses. Will repeat labs 2 weeks after she has been taking 50 mg 3 times daily and attempt to down titrate to the lowest dose possible prior to conception. Discussed the risk of congenital defects with both PTU and methimazole, specifically aplasia cutis. Looking into details of the 14 cases, 7 children were diagnosed with a birth defect in the face and neck region (preauricular and branchial sinus/fistula/cyst) and 7 children had a birth defect in the urinary system (single cyst of kidney and hydronephrosis). We will repeat labs 2 weeks after switching from methimazole to PTU, attempt to lower PTU dose to allow for high normal thyroid function tests. Recommend resuming folic acid supplement. #Graves' disease  -Methimazole 20 mg twice daily switched to PTU 50 mg 3 times daily on April 16  -With the increase in frequency from 2 times daily to 3 times daily, patient missed some doses  -PTU increased to 100 mg 3 times daily on Jes[address] 15, repeat labs 2 weeks later to see how we can down titrate this  -Discussed the risk of congenital birth defects with methimazole and propylthiouracil (currently taking lisinopril HCTZ0  -recommended she ask PCP for alternative BP med given interest in pursuing fertility    Our offices are located at:    [address] [place] [address]), 3rd floor, [address] [facility]. Jessi Slider [address]  [phone] (phone)  [phone] (fax)    [place] [facility]   Address: Eli Scruggs. LUCIANA Carlson, [address] [place] [address]  Phone- [phone]      Copy sent to:Nadeen Loya MD      Return to care September 1st at 2:10    Nicole Vasquez is a 37 y.o. female being evaluated by a Virtual Visit (video visit) encounter to address concerns as mentioned above. A caregiver was present when appropriate. Due to this being a TeleHealth encounter (During CCQXX-61 public health emergency), evaluation of the following organ systems was limited:     Vitals/Constitutional/EENT/Resp/CV/GI//MS/Neuro/Skin/Heme-Lymph-Imm.   Pursuant to the emergency declaration under the [facility] authority and the [employer] [facility] Act, this Virtual Visit was conducted with patient's (and/or legal guardian's) consent, to reduce the risk of exposure to COVID-19 and provide necessary medical care. Services were provided through a video synchronous discussion virtually to substitute for in-person encounter. --Patrick Gould MD on 6/28/2021 at 1:39 PM    An electronic signature was used to authenticate this note.

## 2021-07-13 DIAGNOSIS — E05.00 GRAVES DISEASE: ICD-10-CM

## 2021-07-13 DIAGNOSIS — E05.00 GRAVES DISEASE: Primary | ICD-10-CM

## 2021-07-13 LAB
ALBUMIN SERPL-MCNC: 4.1 G/DL (ref 3.8–4.8)
ALBUMIN/GLOB SERPL: 1.4 {RATIO} (ref 1.2–2.2)
ALP SERPL-CCNC: 155 IU/L (ref 48–121)
ALT SERPL-CCNC: 9 IU/L (ref 0–32)
AST SERPL-CCNC: 14 IU/L (ref 0–40)
BILIRUB SERPL-MCNC: 0.3 MG/DL (ref 0–1.2)
BUN SERPL-MCNC: 6 MG/DL (ref 6–20)
BUN/CREAT SERPL: 11 (ref 9–23)
CALCIUM SERPL-MCNC: 8.7 MG/DL (ref 8.7–10.2)
CHLORIDE SERPL-SCNC: 102 MMOL/L (ref 96–106)
CO2 SERPL-SCNC: 20 MMOL/L (ref 20–29)
CREAT SERPL-MCNC: 0.56 MG/DL (ref 0.57–1)
GLOBULIN SER CALC-MCNC: 2.9 G/DL (ref 1.5–4.5)
GLUCOSE SERPL-MCNC: 79 MG/DL (ref 65–99)
POTASSIUM SERPL-SCNC: 3.7 MMOL/L (ref 3.5–5.2)
PROT SERPL-MCNC: 7 G/DL (ref 6–8.5)
SODIUM SERPL-SCNC: 137 MMOL/L (ref 134–144)
T3 SERPL-MCNC: 240 NG/DL (ref 71–180)
T4 FREE SERPL-MCNC: 1.23 NG/DL (ref 0.82–1.77)

## 2021-07-13 NOTE — LETTER
7/13/2021    Ms. [address] [facility]      Dear Christina Harden:    Please find your most recent results below. Resulted Orders   T4, FREE   Result Value Ref Range    T4, Free 1.23 0.82 - 1.77 ng/dL    Narrative    Performed at:  [address] [address]  [id number]  : Prudence Andre MD, Phone:  [phone]   T3 TOTAL   Result Value Ref Range    T3, total 240 (H) 71 - 180 ng/dL    Narrative    Performed at:  [facility] [address]  [id number]  : Prudence Andre MD, Phone:  [phone]   METABOLIC PANEL, COMPREHENSIVE   Result Value Ref Range    Glucose 79 65 - 99 mg/dL    BUN 6 6 - 20 mg/dL    Creatinine 0.56 (L) 0.57 - 1.00 mg/dL    GFR est non- >59 mL/min/1.73    GFR est  >59 mL/min/1.73    BUN/Creatinine ratio 11 9 - 23    Sodium 137 134 - 144 mmol/L    Potassium 3.7 3.5 - 5.2 mmol/L    Chloride 102 96 - 106 mmol/L    CO2 20 20 - 29 mmol/L    Calcium 8.7 8.7 - 10.2 mg/dL    Protein, total 7.0 6.0 - 8.5 g/dL    Albumin 4.1 3.8 - 4.8 g/dL    GLOBULIN, TOTAL 2.9 1.5 - 4.5 g/dL    A-G Ratio 1.4 1.2 - 2.2    Bilirubin, total 0.3 0.0 - 1.2 mg/dL    Alk. phosphatase 155 (H) 48 - 121 IU/L    AST (SGOT) 14 0 - 40 IU/L    ALT (SGPT) 9 0 - 32 IU/L    Narrative    Performed at:  [address] [email or web address] [address]  [id number]  : Prudence Andre MD, Phone:  [phone]       RECOMMENDATIONS:    Nice reduction in Free T4 and total T3 levels but Total T3 does remain elevated. Continue 100mg three times a day for 2 more weeks and repeat labs at that time; hopefully we can reduce the dose then.      Please call me if you have any questions: [phone]    Sincerely,      Nate Bell MD

## 2021-07-28 DIAGNOSIS — E05.00 GRAVES DISEASE: ICD-10-CM

## 2021-07-28 DIAGNOSIS — E05.00 GRAVES DISEASE: Primary | ICD-10-CM

## 2021-07-28 LAB
ALBUMIN SERPL-MCNC: 4.4 G/DL (ref 3.8–4.8)
ALBUMIN/GLOB SERPL: 1.6 {RATIO} (ref 1.2–2.2)
ALP SERPL-CCNC: 155 IU/L (ref 48–121)
ALT SERPL-CCNC: 11 IU/L (ref 0–32)
AST SERPL-CCNC: 16 IU/L (ref 0–40)
BILIRUB SERPL-MCNC: 0.4 MG/DL (ref 0–1.2)
BUN SERPL-MCNC: 7 MG/DL (ref 6–20)
BUN/CREAT SERPL: 13 (ref 9–23)
CALCIUM SERPL-MCNC: 9 MG/DL (ref 8.7–10.2)
CHLORIDE SERPL-SCNC: 102 MMOL/L (ref 96–106)
CO2 SERPL-SCNC: 22 MMOL/L (ref 20–29)
CREAT SERPL-MCNC: 0.55 MG/DL (ref 0.57–1)
GLOBULIN SER CALC-MCNC: 2.7 G/DL (ref 1.5–4.5)
GLUCOSE SERPL-MCNC: 75 MG/DL (ref 65–99)
POTASSIUM SERPL-SCNC: 3.5 MMOL/L (ref 3.5–5.2)
PROT SERPL-MCNC: 7.1 G/DL (ref 6–8.5)
SODIUM SERPL-SCNC: 136 MMOL/L (ref 134–144)
T3 SERPL-MCNC: 210 NG/DL (ref 71–180)
T4 FREE SERPL-MCNC: 1.05 NG/DL (ref 0.82–1.77)

## 2021-09-01 ENCOUNTER — DOCUMENTATION ONLY (OUTPATIENT)
Dept: ENDOCRINOLOGY | Age: 36
End: 2021-09-01

## 2021-09-08 LAB
T3 SERPL-MCNC: 227 NG/DL (ref 71–180)
T4 FREE SERPL-MCNC: 0.8 NG/DL (ref 0.82–1.77)

## 2021-09-09 ENCOUNTER — DOCUMENTATION ONLY (OUTPATIENT)
Dept: ENDOCRINOLOGY | Age: 36
End: 2021-09-09

## 2021-09-09 DIAGNOSIS — E05.00 GRAVES DISEASE: ICD-10-CM

## 2021-09-09 DIAGNOSIS — E05.00 GRAVES DISEASE: Primary | ICD-10-CM

## 2021-09-09 NOTE — PROGRESS NOTES
Okay thank you for confirming that. Your Total T3 is still high so not time to drop it down to 50mg three times a day yet. The Free T4 dropped below normal so we are definitely heading in the right direction, I think your thyroid just needs a little more time with 100mg twice daily. We will repeat labs in 2 weeks.      Dr. Nehemias Ley    This [employer] message has not been read. Sherry SIMEON  You 5 hours ago (11:28 AM)   SP  Yes I have two pills three times a day. You  Sherry SIMEON 7 hours ago (9:19 AM)   Josef Colin,     I want to confirm that you've been taking 100mg three times a day of PTU before I comment on the dose.      You have not been missing doses, correct? Let me know.

## 2021-10-16 RX ORDER — PROPYLTHIOURACIL 50 MG/1
100 TABLET ORAL 3 TIMES DAILY
Qty: 180 TABLET | Refills: 3 | Status: SHIPPED | OUTPATIENT
Start: 2021-10-16 | End: 2022-09-30 | Stop reason: SDUPTHER

## 2021-10-19 ENCOUNTER — TELEPHONE (OUTPATIENT)
Dept: ENDOCRINOLOGY | Age: 36
End: 2021-10-19

## 2021-10-19 NOTE — TELEPHONE ENCOUNTER
Please call pt to let her know she has an unread message in [employer]: This is Dr. Glenroy Mcclain, covering for Dr. Benji Hayes, who is out on maternity leave until 1/24/22. It appears from her last message on 9/9/21 that you were supposed to go and have labs in 2 weeks but I don't see that this was done and only noticed this as Dr. Benji Hayes received a refill request for PTU and I approved this. Ever Coelho go to [facility] as soon as possible and repeat your labs and I or one of covering partners will be in touch with the results and a plan.  Take care.

## 2021-10-21 LAB
T3 SERPL-MCNC: 190 NG/DL (ref 71–180)
T4 FREE SERPL-MCNC: 0.8 NG/DL (ref 0.82–1.77)

## 2021-10-22 ENCOUNTER — PATIENT MESSAGE (OUTPATIENT)
Dept: ENDOCRINOLOGY | Age: 36
End: 2021-10-22

## 2021-10-22 DIAGNOSIS — E05.00 GRAVES DISEASE: Primary | ICD-10-CM

## 2021-11-19 DIAGNOSIS — E05.00 GRAVES DISEASE: ICD-10-CM

## 2021-12-24 ENCOUNTER — HOSPITAL ENCOUNTER (EMERGENCY)
Age: 36
Discharge: HOME | End: 2021-12-24
Attending: EMERGENCY MEDICINE
Payer: COMMERCIAL

## 2021-12-24 ENCOUNTER — APPOINTMENT (OUTPATIENT)
Dept: GENERAL RADIOLOGY | Age: 36
End: 2021-12-24
Attending: PHYSICIAN ASSISTANT
Payer: COMMERCIAL

## 2021-12-24 VITALS
WEIGHT: 236.11 LBS | BODY MASS INDEX: 37.95 KG/M2 | RESPIRATION RATE: 18 BRPM | SYSTOLIC BLOOD PRESSURE: 145 MMHG | HEIGHT: 66 IN | OXYGEN SATURATION: 97 % | DIASTOLIC BLOOD PRESSURE: 102 MMHG | HEART RATE: 96 BPM | TEMPERATURE: 98.4 F

## 2021-12-24 DIAGNOSIS — M25.511 ACUTE PAIN OF RIGHT SHOULDER: Primary | ICD-10-CM

## 2021-12-24 PROCEDURE — 74011250637 HC RX REV CODE- 250/637: Performed by: PHYSICIAN ASSISTANT

## 2021-12-24 PROCEDURE — 73030 X-RAY EXAM OF SHOULDER: CPT

## 2021-12-24 PROCEDURE — 99283 EMERGENCY DEPT VISIT LOW MDM: CPT

## 2021-12-24 RX ORDER — METHOCARBAMOL 750 MG/1
750 TABLET, FILM COATED ORAL
Status: COMPLETED | OUTPATIENT
Start: 2021-12-24 | End: 2021-12-24

## 2021-12-24 RX ORDER — TRAMADOL HYDROCHLORIDE 50 MG/1
50 TABLET ORAL
Qty: 10 TABLET | Refills: 0 | Status: SHIPPED | OUTPATIENT
Start: 2021-12-24 | End: 2021-12-27

## 2021-12-24 RX ORDER — METHOCARBAMOL 750 MG/1
750 TABLET, FILM COATED ORAL 4 TIMES DAILY
Qty: 20 TABLET | Refills: 0 | Status: SHIPPED | OUTPATIENT
Start: 2021-12-24 | End: 2022-05-13

## 2021-12-24 RX ORDER — TRAMADOL HYDROCHLORIDE 50 MG/1
50 TABLET ORAL
Status: COMPLETED | OUTPATIENT
Start: 2021-12-24 | End: 2021-12-24

## 2021-12-24 RX ORDER — NAPROXEN 500 MG/1
500 TABLET ORAL
Qty: 20 TABLET | Refills: 0 | Status: SHIPPED | OUTPATIENT
Start: 2021-12-24 | End: 2022-05-13

## 2021-12-24 RX ORDER — NAPROXEN 250 MG/1
500 TABLET ORAL
Status: COMPLETED | OUTPATIENT
Start: 2021-12-24 | End: 2021-12-24

## 2021-12-24 RX ADMIN — NAPROXEN 500 MG: 250 TABLET ORAL at 21:28

## 2021-12-24 RX ADMIN — METHOCARBAMOL TABLETS 750 MG: 750 TABLET, COATED ORAL at 21:28

## 2021-12-24 RX ADMIN — TRAMADOL HYDROCHLORIDE 50 MG: 50 TABLET, COATED ORAL at 21:28

## 2021-12-25 NOTE — DISCHARGE INSTRUCTIONS
It was a pleasure taking care of you at [facility] today. We know that when you come to [facility], you are entrusting us with your health, comfort, and safety. Our physicians and nurses honor that trust, and we truly appreciate the opportunity to care for you and your loved ones. We also value your feedback. If you receive a survey about your Emergency Department experience today, please fill it out. We care about our patients' feedback, and we listen to what you have to say. Thank you!

## 2022-01-25 DIAGNOSIS — E05.00 GRAVES DISEASE: Primary | ICD-10-CM

## 2022-02-08 ENCOUNTER — DOCUMENTATION ONLY (OUTPATIENT)
Dept: ENDOCRINOLOGY | Age: 37
End: 2022-02-08

## 2022-02-08 NOTE — PROGRESS NOTES
Just was notified this pt didn't see my Sequence message.  Can you call her and ask her to go to the lab and schedule a fu with me? [address] Pimentel,     Please go to [facility] to see how your labs look, I ordered them.     Dr. Katie Cotton

## 2022-02-11 ENCOUNTER — TELEPHONE (OUTPATIENT)
Dept: ENDOCRINOLOGY | Age: 37
End: 2022-02-11

## 2022-02-11 NOTE — TELEPHONE ENCOUNTER
----- Message from Morgan Rider MD sent at 2/8/2022  2:18 PM EST -----  Just was notified this pt didn't see my [employer] message.  Can you call her and ask her to go to the lab and schedule a fu with me? Raul Pimentel,     Please go to [facility] to see how your labs look, I ordered them.     Dr. Oren Whelan

## 2022-02-17 LAB
ALBUMIN SERPL-MCNC: 4.3 G/DL (ref 3.8–4.8)
ALBUMIN/GLOB SERPL: 1.4 {RATIO} (ref 1.2–2.2)
ALP SERPL-CCNC: 97 IU/L (ref 44–121)
ALT SERPL-CCNC: 10 IU/L (ref 0–32)
AST SERPL-CCNC: 15 IU/L (ref 0–40)
BILIRUB SERPL-MCNC: 0.3 MG/DL (ref 0–1.2)
BUN SERPL-MCNC: 7 MG/DL (ref 6–20)
BUN/CREAT SERPL: 10 (ref 9–23)
CALCIUM SERPL-MCNC: 8.7 MG/DL (ref 8.7–10.2)
CHLORIDE SERPL-SCNC: 103 MMOL/L (ref 96–106)
CO2 SERPL-SCNC: 19 MMOL/L (ref 20–29)
CREAT SERPL-MCNC: 0.71 MG/DL (ref 0.57–1)
GLOBULIN SER CALC-MCNC: 3 G/DL (ref 1.5–4.5)
GLUCOSE SERPL-MCNC: 87 MG/DL (ref 65–99)
POTASSIUM SERPL-SCNC: 3.6 MMOL/L (ref 3.5–5.2)
PROT SERPL-MCNC: 7.3 G/DL (ref 6–8.5)
SODIUM SERPL-SCNC: 137 MMOL/L (ref 134–144)
T3 SERPL-MCNC: 169 NG/DL (ref 71–180)
T4 FREE SERPL-MCNC: 0.87 NG/DL (ref 0.82–1.77)
TSH SERPL DL<=0.005 MIU/L-ACNC: <0.005 UIU/ML (ref 0.45–4.5)

## 2022-02-19 DIAGNOSIS — E05.00 GRAVES DISEASE: Primary | ICD-10-CM

## 2022-02-19 NOTE — LETTER
2/19/2022    Ms. Hoffmann [id number]      Dear Wilda Centeno:    Please find your most recent results below. Resulted Orders   T4, FREE   Result Value Ref Range    T4, Free 0.87 0.82 - 1.77 ng/dL    Narrative    Performed at:  [address]  [id number]  : Rosi Sagastume MD, Phone:  [phone]   T3 TOTAL   Result Value Ref Range    T3, total 169 71 - 180 ng/dL    Narrative    Performed at:  [address]  [id number]  : Rosi Sagastume MD, Phone:  [phone]   METABOLIC PANEL, COMPREHENSIVE   Result Value Ref Range    Glucose 87 65 - 99 mg/dL    BUN 7 6 - 20 mg/dL    Creatinine 0.71 0.57 - 1.00 mg/dL    GFR est non- >59 mL/min/1.73    GFR est  >59 mL/min/1.73    BUN/Creatinine ratio 10 9 - 23    Sodium 137 134 - 144 mmol/L    Potassium 3.6 3.5 - 5.2 mmol/L    Chloride 103 96 - 106 mmol/L    CO2 19 (L) 20 - 29 mmol/L    Calcium 8.7 8.7 - 10.2 mg/dL    Protein, total 7.3 6.0 - 8.5 g/dL    Albumin 4.3 3.8 - 4.8 g/dL    GLOBULIN, TOTAL 3.0 1.5 - 4.5 g/dL    A-G Ratio 1.4 1.2 - 2.2    Bilirubin, total 0.3 0.0 - 1.2 mg/dL    Alk. phosphatase 97 44 - 121 IU/L    AST (SGOT) 15 0 - 40 IU/L    ALT (SGPT) 10 0 - 32 IU/L    Narrative    Performed at:  [address]  [id number]  : Rosi Sagastume MD, Phone:  [phone]   TSH 3RD GENERATION   Result Value Ref Range    TSH <0.005 (L) 0.450 - 4.500 uIU/mL    Narrative    Performed at:  [address]  [id number]  : Rosi Sagastume MD, Phone:  [phone]       RECOMMENDATIONS:    Flaquito Bello,    Your labs look good. We do need to continue the PTU 100mg three times daily for a couple months and then reassess your TSH. Please make a follow up appointment with me.  It may not be until June or July so I need you to have these labs done in a couple of months to make sure they're good going into a potential pregnancy.      Please call me if you have any questions: [phone]    Sincerely,      Chantel Salcido MD

## 2022-03-18 PROBLEM — E05.00 GRAVES DISEASE: Status: ACTIVE | Noted: 2021-01-06

## 2022-04-15 DIAGNOSIS — E05.00 GRAVES DISEASE: ICD-10-CM

## 2022-04-26 DIAGNOSIS — I10 ESSENTIAL HYPERTENSION: ICD-10-CM

## 2022-04-26 RX ORDER — LISINOPRIL AND HYDROCHLOROTHIAZIDE 12.5; 2 MG/1; MG/1
TABLET ORAL
Qty: 90 TABLET | Refills: 3 | Status: SHIPPED | OUTPATIENT
Start: 2022-04-26 | End: 2022-04-26 | Stop reason: SDUPTHER

## 2022-04-26 RX ORDER — LISINOPRIL AND HYDROCHLOROTHIAZIDE 12.5; 2 MG/1; MG/1
1 TABLET ORAL DAILY
Qty: 30 TABLET | Refills: 0 | Status: SHIPPED | OUTPATIENT
Start: 2022-04-26

## 2022-05-13 ENCOUNTER — HOSPITAL ENCOUNTER (EMERGENCY)
Age: 37
Discharge: HOME | End: 2022-05-14
Attending: STUDENT IN AN ORGANIZED HEALTH CARE EDUCATION/TRAINING PROGRAM | Admitting: STUDENT IN AN ORGANIZED HEALTH CARE EDUCATION/TRAINING PROGRAM
Payer: COMMERCIAL

## 2022-05-13 VITALS
HEIGHT: 66 IN | BODY MASS INDEX: 37.95 KG/M2 | DIASTOLIC BLOOD PRESSURE: 88 MMHG | SYSTOLIC BLOOD PRESSURE: 130 MMHG | OXYGEN SATURATION: 100 % | RESPIRATION RATE: 18 BRPM | HEART RATE: 89 BPM | WEIGHT: 236.11 LBS | TEMPERATURE: 97.9 F

## 2022-05-13 DIAGNOSIS — L02.419 ARMPIT ABSCESS: Primary | ICD-10-CM

## 2022-05-13 PROCEDURE — 96372 THER/PROPH/DIAG INJ SC/IM: CPT

## 2022-05-13 PROCEDURE — 99284 EMERGENCY DEPT VISIT MOD MDM: CPT

## 2022-05-13 PROCEDURE — 75810000289 HC I&D ABSCESS SIMP/COMP/MULT

## 2022-05-14 PROCEDURE — 74011250637 HC RX REV CODE- 250/637: Performed by: STUDENT IN AN ORGANIZED HEALTH CARE EDUCATION/TRAINING PROGRAM

## 2022-05-14 PROCEDURE — 74011250636 HC RX REV CODE- 250/636: Performed by: STUDENT IN AN ORGANIZED HEALTH CARE EDUCATION/TRAINING PROGRAM

## 2022-05-14 PROCEDURE — 74011000250 HC RX REV CODE- 250: Performed by: STUDENT IN AN ORGANIZED HEALTH CARE EDUCATION/TRAINING PROGRAM

## 2022-05-14 RX ORDER — KETOROLAC TROMETHAMINE 30 MG/ML
30 INJECTION, SOLUTION INTRAMUSCULAR; INTRAVENOUS
Status: COMPLETED | OUTPATIENT
Start: 2022-05-14 | End: 2022-05-14

## 2022-05-14 RX ORDER — LIDOCAINE HYDROCHLORIDE 10 MG/ML
10 INJECTION, SOLUTION EPIDURAL; INFILTRATION; INTRACAUDAL; PERINEURAL ONCE
Status: COMPLETED | OUTPATIENT
Start: 2022-05-14 | End: 2022-05-14

## 2022-05-14 RX ORDER — NAPROXEN 500 MG/1
500 TABLET ORAL
Qty: 20 TABLET | Refills: 0 | Status: SHIPPED | OUTPATIENT
Start: 2022-05-14 | End: 2022-07-27 | Stop reason: ALTCHOICE

## 2022-05-14 RX ORDER — OXYCODONE HYDROCHLORIDE 5 MG/1
5 TABLET ORAL
Status: COMPLETED | OUTPATIENT
Start: 2022-05-14 | End: 2022-05-14

## 2022-05-14 RX ADMIN — KETOROLAC TROMETHAMINE 30 MG: 30 INJECTION, SOLUTION INTRAMUSCULAR at 00:32

## 2022-05-14 RX ADMIN — OXYCODONE 5 MG: 5 TABLET ORAL at 00:31

## 2022-05-14 RX ADMIN — LIDOCAINE HYDROCHLORIDE 10 ML: 10 INJECTION, SOLUTION EPIDURAL; INFILTRATION; INTRACAUDAL; PERINEURAL at 00:31

## 2022-05-15 NOTE — ED PROVIDER NOTES
EMERGENCY DEPARTMENT HISTORY AND PHYSICAL EXAM      Date: 5/13/2022  Patient Name: Adelia Walsh    History of Presenting Illness     Chief Complaint   Patient presents with    Skin Problem     Patient reports a boil to the L armpit that will not go away. History Provided By: Patient    HPI: Adelia Walsh, 31 y.o. female with past medical history as listed below presents to the ED with cc of left armpit painful \"boil\" x several weeks. Patient reports she has had similar problems in the past that required I&D. States she has had similar \"boils\" over bilateral armpits. Patient states she has been attempting to treat her symptoms this time with warm compresses, however, \"it just won't come to a head. \"  She reports area of swelling has been enlarging, becoming more painful over the past several days. She denies any fevers, chills, nausea, vomiting. She denies any weakness, numbness, tingling down to the distal aspects of her left lower extremity. Denies any loss of range of motion of the left shoulder joint. PCP: Mark Jimenez MD    No current facility-administered medications on file prior to encounter. Current Outpatient Medications on File Prior to Encounter   Medication Sig Dispense Refill    lisinopril-hydroCHLOROthiazide (PRINZIDE, ZESTORETIC) 20-12.5 mg per tablet Take 1 Tablet by mouth daily. 30 Tablet 0    propylthiouraciL (PTU) 50 mg tablet Take 2 Tablets by mouth three (3) times daily.  180 Tablet 3    fluticasone propionate (FLONASE) 50 mcg/actuation nasal spray SPRAY 2 SPRAYS INTO EACH NOSTRIL EVERY DAY 1 Bottle 1       Past History     Past Medical History:  Past Medical History:   Diagnosis Date    Goiter     Headache     Hypertension        Past Surgical History:  Past Surgical History:   Procedure Laterality Date    HX GYN      ectopic pregnancy - lapartomy       Family History:  Family History   Problem Relation Age of Onset    Hypertension Mother    Raquel Larger Migraines Mother     Cancer Maternal Grandmother         brain tumor       Social History:  Social History     Tobacco Use    Smoking status: Current Every Day Smoker     Packs/day: 0.25    Smokeless tobacco: Never Used    Tobacco comment: 2-3 cigarettes/day   Vaping Use    Vaping Use: Never used   Substance Use Topics    Alcohol use: Yes     Alcohol/week: 1.0 standard drink     Types: 1 Glasses of wine per week     Comment: occassionally    Drug use: No       Allergies: Allergies   Allergen Reactions    Nuts [Tree Nut] Anaphylaxis and Shortness of Breath         Review of Systems   Review of Systems   Constitutional: Negative for chills and fever. HENT: Negative for congestion and rhinorrhea. Eyes: Negative for visual disturbance. Respiratory: Negative for chest tightness and shortness of breath. Cardiovascular: Negative for chest pain and palpitations. Gastrointestinal: Negative for abdominal pain, diarrhea, nausea and vomiting. Genitourinary: Negative for dysuria, flank pain and hematuria. Musculoskeletal: Negative for back pain and neck pain. Skin: Negative for rash. Left armpit \"boil\"   Allergic/Immunologic: Negative for immunocompromised state. Neurological: Negative for dizziness, speech difficulty, weakness and headaches. Hematological: Negative for adenopathy. Psychiatric/Behavioral: Negative for dysphoric mood and suicidal ideas. Physical Exam   Physical Exam  Vitals and nursing note reviewed. Constitutional:       General: She is not in acute distress. Appearance: She is not ill-appearing or toxic-appearing. HENT:      Head: Normocephalic and atraumatic. Nose: Nose normal.      Mouth/Throat:      Mouth: Mucous membranes are moist.   Eyes:      Extraocular Movements: Extraocular movements intact. Pupils: Pupils are equal, round, and reactive to light. Cardiovascular:      Rate and Rhythm: Normal rate and regular rhythm.       Pulses: Normal pulses. Pulmonary:      Effort: Pulmonary effort is normal.      Breath sounds: No stridor. No wheezing or rhonchi. Abdominal:      General: Abdomen is flat. There is no distension. Tenderness: There is no abdominal tenderness. Musculoskeletal:         General: Normal range of motion. Cervical back: Normal range of motion and neck supple. Skin:     General: Skin is warm and dry. Comments: There is a 1.5-2cm horizontal linear scar noted over area of fluctuance in the armpit from her previous I&D. Neurological:      General: No focal deficit present. Mental Status: She is alert and oriented to person, place, and time. Psychiatric:         Judgment: Judgment normal.         Diagnostic Study Results     Labs -   No results found for this or any previous visit (from the past 24 hour(s)). Radiologic Studies -   No orders to display     CT Results  (Last 48 hours)    None        CXR Results  (Last 48 hours)    None          Medical Decision Making   ICarlitos MD am the first provider for this patient, and I am the attending of record for this patient encounter. I reviewed the vital signs, available nursing notes, past medical history, past surgical history, family history and social history. Vital Signs-Reviewed the patient's vital signs. No data found. Records Reviewed: Nursing Notes and Old Medical Records    Provider Notes (Medical Decision Making):   Patient's exam is consistent with a diagnosis of armpit abscess. This was confirmed with bedside ultrasound, which showed a large area of discrete fluid collection, amenable to I&D. Patient was explained risk versus benefits of procedure, and elects to proceed. Procedure Note - Bedside Ultrasound:  11:55 PM  Performed by: Carlitos Torres MD  US of L armpit performed at beside, showing discrete fluid collection consistent with diagnosis of abscess.    The procedure took 1-15 minutes, and pt tolerated well.    Procedure Note - Incision and Drainage:   00:35 PM  Performed by: Nicolasa Dykes MD  Complexity: Complex  Skin prepped with Chlorprep. Sterile field established. Anesthesia achieved using a local infiltration of 5 mL lidocaine 2% without epinephrine. Abscess to axilla(e):left was incised with #11 blade, and moderate volume of purulent drainage was expressed. Wound probed and irrigated. Sterile dressing applied. Estimated blood loss: negligible  The procedure took 16-30 minutes, and pt tolerated moderately. ----------------------------------------------    I discussed with patient that she would benefit from follow-up with surgery after I&D today, as this is the second time she has had reaccumulation of abscess over the same area, and that there could be a sinus tract present which may need more in-depth management in order to prevent recurrence. She verbalized understanding and agreement. ED Course:   Initial assessment performed. The patient's presenting problems have been discussed, and they are in agreement with the care plan formulated and outlined with them. I have encouraged them to ask questions as they arise throughout their visit. Nicolasa Dykes MD      Disposition:  Discharge      DISCHARGE PLAN:  1. Discharge Medication List as of 5/14/2022  1:22 AM      START taking these medications    Details   naproxen (NAPROSYN) 500 mg tablet Take 1 Tablet by mouth every twelve (12) hours as needed for Pain., Normal, Disp-20 Tablet, R-0         CONTINUE these medications which have NOT CHANGED    Details   lisinopril-hydroCHLOROthiazide (PRINZIDE, ZESTORETIC) 20-12.5 mg per tablet Take 1 Tablet by mouth daily. , Normal, Disp-30 Tablet, R-0APPOINTMENT NEEDED      propylthiouraciL (PTU) 50 mg tablet Take 2 Tablets by mouth three (3) times daily. , Normal, Disp-180 Tablet, R-3      fluticasone propionate (FLONASE) 50 mcg/actuation nasal spray SPRAY 2 SPRAYS INTO EACH NOSTRIL EVERY DAY, Normal, Disp-1 Bottle, R-1           2. Follow-up Information     Follow up With Specialties Details Why Contact Info    Siddharth Jung MD General Surgery Schedule an appointment as soon as possible for a visit   [address]  [facility] [address]  eLslye Hamilton [phone]      Aaron Hernandez MD Family Medicine   [address]  [phone]          3. Return to ED if worse     Diagnosis     Clinical Impression:   1. Armpit abscess        Attestations:    Viral Hess MD    Please note that this dictation was completed with [employer], the computer voice recognition software. Quite often unanticipated grammatical, syntax, homophones, and other interpretive errors are inadvertently transcribed by the computer software. Please disregard these errors. Please excuse any errors that have escaped final proofreading. Thank you.

## 2022-06-20 ENCOUNTER — HOSPITAL ENCOUNTER (EMERGENCY)
Age: 37
Discharge: HOME | End: 2022-06-20
Attending: EMERGENCY MEDICINE
Payer: MEDICAID

## 2022-06-20 VITALS
RESPIRATION RATE: 14 BRPM | WEIGHT: 239.2 LBS | SYSTOLIC BLOOD PRESSURE: 126 MMHG | OXYGEN SATURATION: 100 % | DIASTOLIC BLOOD PRESSURE: 77 MMHG | BODY MASS INDEX: 38.44 KG/M2 | HEART RATE: 100 BPM | TEMPERATURE: 98.5 F | HEIGHT: 66 IN

## 2022-06-20 DIAGNOSIS — N39.0 ACUTE UTI: Primary | ICD-10-CM

## 2022-06-20 LAB
APPEARANCE UR: CLEAR
BACTERIA URNS QL MICRO: ABNORMAL /HPF
BILIRUB UR QL: NEGATIVE
COLOR UR: ABNORMAL
EPITH CASTS URNS QL MICRO: ABNORMAL /LPF
GLUCOSE UR STRIP.AUTO-MCNC: NEGATIVE MG/DL
HCG UR QL: NEGATIVE
HGB UR QL STRIP: ABNORMAL
KETONES UR QL STRIP.AUTO: ABNORMAL MG/DL
LEUKOCYTE ESTERASE UR QL STRIP.AUTO: NEGATIVE
NITRITE UR QL STRIP.AUTO: NEGATIVE
PH UR STRIP: 6 [PH] (ref 5–8)
PROT UR STRIP-MCNC: NEGATIVE MG/DL
RBC #/AREA URNS HPF: ABNORMAL /HPF (ref 0–5)
SP GR UR REFRACTOMETRY: 1.01
UA: UC IF INDICATED,UAUC: ABNORMAL
UROBILINOGEN UR QL STRIP.AUTO: 1 EU/DL (ref 0.2–1)
WBC URNS QL MICRO: ABNORMAL /HPF (ref 0–4)

## 2022-06-20 PROCEDURE — 87086 URINE CULTURE/COLONY COUNT: CPT

## 2022-06-20 PROCEDURE — 81001 URINALYSIS AUTO W/SCOPE: CPT

## 2022-06-20 PROCEDURE — 81025 URINE PREGNANCY TEST: CPT

## 2022-06-20 PROCEDURE — 99283 EMERGENCY DEPT VISIT LOW MDM: CPT

## 2022-06-20 RX ORDER — CEPHALEXIN 500 MG/1
500 CAPSULE ORAL 2 TIMES DAILY
Qty: 20 CAPSULE | Refills: 0 | Status: SHIPPED | OUTPATIENT
Start: 2022-06-20 | End: 2022-06-30

## 2022-06-20 RX ORDER — PHENAZOPYRIDINE HYDROCHLORIDE 200 MG/1
200 TABLET, FILM COATED ORAL 3 TIMES DAILY
Qty: 6 TABLET | Refills: 0 | Status: SHIPPED | OUTPATIENT
Start: 2022-06-20 | End: 2022-06-22

## 2022-06-20 NOTE — ED PROVIDER NOTES
EMERGENCY DEPARTMENT HISTORY AND PHYSICAL EXAM      Date: 6/20/2022  Patient Name: Camron Pastrana    History of Presenting Illness     Chief Complaint   Patient presents with    Urinary Pain     four days ago hurt to void today she is passing blood; she took cranberry juice and Azo. History Provided By: Patient    HPI: Camron Pastrana, 31 y.o. female with significant PMHx for HTN and goiter, per patient and record review, presents ambulatory to the ED with cc of dysuria. Patient endorses mild, intermittent dysuria 4 days ago that has become progressively worse and more constant. Endorses urinary urgency and dysuria. States today she noticed some blood in her urine. States she has been taking over-the-counter Azo cranberry pills and drinking cranberry juice with minimal improvement. Denies any vaginal discharge or bleeding. LMP 2 weeks ago. Denies concern for STDs or STIs. Denies concern for pregnancy. Endorses mild lower abdominal discomfort only when voiding. Denies any back pain. Denies any fevers or chills, headache, dizziness, cough, chest pain, shortness of breath, dark tarry or bright red bloody stools, rashes, weakness, loss of consciousness. No additional exacerbating alleviating factors. No other complaints at this time. There are no other complaints, changes, or physical findings at this time. PCP: Alea Stevens MD    No current facility-administered medications on file prior to encounter. Current Outpatient Medications on File Prior to Encounter   Medication Sig Dispense Refill    naproxen (NAPROSYN) 500 mg tablet Take 1 Tablet by mouth every twelve (12) hours as needed for Pain. 20 Tablet 0    lisinopril-hydroCHLOROthiazide (PRINZIDE, ZESTORETIC) 20-12.5 mg per tablet Take 1 Tablet by mouth daily. 30 Tablet 0    propylthiouraciL (PTU) 50 mg tablet Take 2 Tablets by mouth three (3) times daily.  180 Tablet 3    fluticasone propionate (FLONASE) 50 mcg/actuation nasal spray SPRAY 2 SPRAYS INTO EACH NOSTRIL EVERY DAY 1 Bottle 1       Past History     Past Medical History:  Past Medical History:   Diagnosis Date    Goiter     Headache     Hypertension        Past Surgical History:  Past Surgical History:   Procedure Laterality Date    HX GYN      ectopic pregnancy - lapartomy       Family History:  Family History   Problem Relation Age of Onset    Hypertension Mother    Weber Migraines Mother     Cancer Maternal Grandmother         brain tumor       Social History:  Social History     Tobacco Use    Smoking status: Current Every Day Smoker     Packs/day: 0.25    Smokeless tobacco: Never Used    Tobacco comment: 2-3 cigarettes/day   Vaping Use    Vaping Use: Never used   Substance Use Topics    Alcohol use: Yes     Alcohol/week: 1.0 standard drink     Types: 1 Glasses of wine per week     Comment: occassionally    Drug use: No       Allergies: Allergies   Allergen Reactions    Nuts [Tree Nut] Anaphylaxis and Shortness of Breath         Review of Systems   Review of Systems   Constitutional: Negative for appetite change, chills and fever. HENT: Negative for congestion. Eyes: Negative for pain. Respiratory: Negative for cough and shortness of breath. Cardiovascular: Negative for chest pain. Gastrointestinal: Negative for abdominal pain, constipation, diarrhea, nausea and vomiting. Genitourinary: Positive for dysuria, frequency, hematuria and urgency. Negative for decreased urine volume, difficulty urinating, enuresis, flank pain, genital sores, menstrual problem, vaginal bleeding, vaginal discharge and vaginal pain. Musculoskeletal: Negative for back pain, neck pain and neck stiffness. Skin: Negative for rash. Neurological: Negative for syncope and headaches. All other systems reviewed and are negative. Physical Exam   Physical Exam  Vitals and nursing note reviewed.    Constitutional:       General: She is not in acute distress. Appearance: Normal appearance. She is not ill-appearing, toxic-appearing or diaphoretic. Comments: 31 y.o. female   HENT:      Head: Normocephalic and atraumatic. Nose: Nose normal.      Mouth/Throat:      Mouth: Mucous membranes are moist.   Eyes:      Extraocular Movements: Extraocular movements intact. Conjunctiva/sclera: Conjunctivae normal.   Cardiovascular:      Rate and Rhythm: Normal rate. Pulmonary:      Effort: Pulmonary effort is normal. No respiratory distress. Abdominal:      General: There is no distension. Palpations: Abdomen is soft. There is no mass. Tenderness: There is no abdominal tenderness. There is no right CVA tenderness, left CVA tenderness or guarding. Musculoskeletal:         General: Normal range of motion. Cervical back: Normal range of motion. Skin:     General: Skin is warm and dry. Neurological:      General: No focal deficit present. Mental Status: She is alert and oriented to person, place, and time.    Psychiatric:         Mood and Affect: Mood normal.         Behavior: Behavior normal.         Diagnostic Study Results     Labs -     Recent Results (from the past 12 hour(s))   HCG URINE, QL. - POC    Collection Time: 06/20/22  6:50 PM   Result Value Ref Range    Pregnancy test,urine (POC) Negative NEG     URINALYSIS W/ REFLEX CULTURE    Collection Time: 06/20/22  6:51 PM    Specimen: Urine   Result Value Ref Range    Color YELLOW/STRAW      Appearance CLEAR CLEAR      Specific gravity 1.015      pH (UA) 6.0 5.0 - 8.0      Protein Negative NEG mg/dL    Glucose Negative NEG mg/dL    Ketone TRACE (A) NEG mg/dL    Bilirubin Negative NEG      Blood LARGE (A) NEG      Urobilinogen 1.0 0.2 - 1.0 EU/dL    Nitrites Negative NEG      Leukocyte Esterase Negative NEG      WBC 0-4 0 - 4 /hpf    RBC 0-5 0 - 5 /hpf    Epithelial cells FEW FEW /lpf    Bacteria 1+ (A) NEG /hpf    UA:UC IF INDICATED CULTURE NOT INDICATED BY UA RESULT CNI Radiologic Studies -   No orders to display     CT Results  (Last 48 hours)    None        CXR Results  (Last 48 hours)    None            Medical Decision Making   I am the first provider for this patient. I reviewed the vital signs, available nursing notes, past medical history, past surgical history, family history and social history. Vital Signs-Reviewed the patient's vital signs. Patient Vitals for the past 12 hrs:   Temp Pulse Resp BP SpO2   06/20/22 1842 98.5 °F (36.9 °C) 100 14 126/77 100 %       Records Reviewed: Nursing Notes and Old Medical Records    Provider Notes (Medical Decision Making):   Patient is a very pleasant and well appearing 32 yo female who presents to the ED for evaluation of urinary symptoms as noted above. Urinalysis with 1+ bacteria and blood, culture sent. Will place on keflex. Denies concern for STDs/STIs. Denies back pain. Benign abdominal exam, no CVA tenderness. Symptoms consistent with acute uncomplicated cystitis. No systemic symptoms. Not septic. Well appearing. Low suspicion for acute pyelonephritis given lack of fever, CVAT, or systemic features. Low suspicion for kidney stone or infected stone. UPT  negative; not consistent with pregnancy, including ectopic. No indication for labs or imaging at this time. No evidence of emergent conditions requiring further evaluation/management acutely here at this time. Shared decision making performed and care plan created together, discussed results, diagnosis and treatment plan. Counseled symptomatic management techniques. PCP follow-up. Verbal return precautions advised. Patient verbalizes understanding and agreement of current plan of care. ED Course:   Initial assessment performed. The patients presenting problems have been discussed, and they are in agreement with the care plan formulated and outlined with them. I have encouraged them to ask questions as they arise throughout their visit.          Critical Care Time: None    Disposition:  Discharge     PLAN:  1. Current Discharge Medication List      START taking these medications    Details   cephALEXin (Keflex) 500 mg capsule Take 1 Capsule by mouth two (2) times a day for 10 days. Qty: 20 Capsule, Refills: 0  Start date: 6/20/2022, End date: 6/30/2022      phenazopyridine (Pyridium) 200 mg tablet Take 1 Tablet by mouth three (3) times daily for 2 days. Qty: 6 Tablet, Refills: 0  Start date: 6/20/2022, End date: 6/22/2022           2. Follow-up Information     Follow up With Specialties Details Why Contact Info    [facility] EMERGENCY DEPT Emergency Medicine  As needed, If symptoms worsen [address] [place] [address]  [phone]    Alfonso Oliva MD Family Medicine In 1 week  [address] [facility] [id number] [phone]          Return to ED if worse     Diagnosis     Clinical Impression:   1. Acute UTI          Please note that this dictation was completed with [employer], the computer voice recognition software. Quite often unanticipated grammatical, syntax, homophones, and other interpretive errors are inadvertently transcribed by the computer software. Please disregards these errors. Please excuse any errors that have escaped final proofreading.

## 2022-06-20 NOTE — Clinical Note
[address]  [facility] EMERGENCY DEPT  [address]  [place] [id number] [phone]    Work/School Note    Date: 6/20/2022    To Whom It May concern:    Stephen Ozuna was seen and treated today in the emergency room by the following provider(s):  Attending Provider: Juan Daniel Dunn MD  Physician Assistant: Linda Blanco. Stephen Ozuna is excused from work/school on 06/20/22 and 06/21/22. She is medically clear to return to work/school on 6/22/2022.        Sincerely,          DERECK Bradford

## 2022-06-22 LAB
BACTERIA SPEC CULT: NORMAL
SERVICE CMNT-IMP: NORMAL

## 2022-07-25 DIAGNOSIS — E05.00 GRAVES DISEASE: Primary | ICD-10-CM

## 2022-07-25 DIAGNOSIS — E05.00 GRAVES DISEASE: ICD-10-CM

## 2022-07-26 LAB
ALBUMIN SERPL-MCNC: 4 G/DL (ref 3.8–4.8)
ALBUMIN/GLOB SERPL: 1.3 {RATIO} (ref 1.2–2.2)
ALP SERPL-CCNC: 92 IU/L (ref 44–121)
ALT SERPL-CCNC: 11 IU/L (ref 0–32)
AST SERPL-CCNC: 12 IU/L (ref 0–40)
BILIRUB SERPL-MCNC: 0.4 MG/DL (ref 0–1.2)
BUN SERPL-MCNC: 6 MG/DL (ref 6–20)
BUN/CREAT SERPL: 11 (ref 9–23)
CALCIUM SERPL-MCNC: 8.9 MG/DL (ref 8.7–10.2)
CHLORIDE SERPL-SCNC: 101 MMOL/L (ref 96–106)
CO2 SERPL-SCNC: 23 MMOL/L (ref 20–29)
CREAT SERPL-MCNC: 0.55 MG/DL (ref 0.57–1)
EGFR: 123 ML/MIN/1.73
GLOBULIN SER CALC-MCNC: 3.2 G/DL (ref 1.5–4.5)
GLUCOSE SERPL-MCNC: 78 MG/DL (ref 65–99)
POTASSIUM SERPL-SCNC: 3.7 MMOL/L (ref 3.5–5.2)
PROT SERPL-MCNC: 7.2 G/DL (ref 6–8.5)
SODIUM SERPL-SCNC: 138 MMOL/L (ref 134–144)
T3 SERPL-MCNC: 281 NG/DL (ref 71–180)
T4 FREE SERPL-MCNC: 1.59 NG/DL (ref 0.82–1.77)
TSH SERPL DL<=0.005 MIU/L-ACNC: <0.005 UIU/ML (ref 0.45–4.5)

## 2022-07-27 ENCOUNTER — OFFICE VISIT (OUTPATIENT)
Dept: ENDOCRINOLOGY | Age: 37
End: 2022-07-27
Payer: COMMERCIAL

## 2022-07-27 VITALS
WEIGHT: 237 LBS | SYSTOLIC BLOOD PRESSURE: 132 MMHG | OXYGEN SATURATION: 95 % | RESPIRATION RATE: 16 BRPM | DIASTOLIC BLOOD PRESSURE: 84 MMHG | HEIGHT: 66 IN | BODY MASS INDEX: 38.09 KG/M2 | HEART RATE: 85 BPM

## 2022-07-27 DIAGNOSIS — N91.2 AMENORRHEA: ICD-10-CM

## 2022-07-27 DIAGNOSIS — Z31.69 ENCOUNTER FOR PRECONCEPTION CONSULTATION: ICD-10-CM

## 2022-07-27 DIAGNOSIS — E05.00 GRAVES DISEASE: ICD-10-CM

## 2022-07-27 DIAGNOSIS — E05.00 GRAVES DISEASE: Primary | ICD-10-CM

## 2022-07-27 PROCEDURE — 99214 OFFICE O/P EST MOD 30 MIN: CPT | Performed by: INTERNAL MEDICINE

## 2022-07-27 NOTE — PROGRESS NOTES
Chief Complaint   Patient presents with    Follow-up    Thyroid Problem     History of Present Illness: Orlin Goodson is a 31 y.o. female with a past medical hx significant for HTN, headaches presenting in referral from Gunnar Healy MD for a low TSH. Went to have a physical in December who noticed she had a large thyroid. Reports that she's never noticed thyroid enlargement until December. Works as schoolbus . Notes tremor, has had that for a few months. Thought she was just nervous. Has gained 40 lbs- since September- before this started was 145, now 185 - is 6\"6. Just started taking biotin 1 week ago- is taking 1 2500mcg of biotin. Was taking B and C complex vitamins- b12. No iodine. No CT scans with contrast. Does endorse some pain when turns to the left, numbness in the R cheek. Chokes on liquids. Is interested in pursuing fertility. Going back to school on Feb 22nd- end of March. Experienced an ectopic pregnancy 12 years ago and has been trying to conceive for the past 5 years without success. Has seen fertility specialists regarding this. Menstrual periods are very irregular. Is actively pursuing fertility at this time. 03/17/2021: Has been taking 4 a day- has been doing that since I sent in rx. Doesn't hurt when she moves her hand, still has a little shake. It is not as bad. Still forgets everything. PCP recently started lisinopril/hctz- was not told number. 153/101 in the office- does not check at home. 04/22/2021: Switched over to PTU 04/14, repeat labs are due May 3rd. Boyfriend has semena nalysis pending next week.     06/28/2021:Hasn't been taking med 3x daily- got back on track last week. Forgot to email me back. Went back to repro when she was on a cycle- waiting of boyfriend to do the semen analysis, labs.     07/27/2022: Still interested in pursuing fertility, previous relationship did end. Missing the evening dose of PTU frequently.   Not taking a prenatal vitamin. Still taking lisinopril. Previously used LH test strips for 3 months and never ovulated. Family members previously have had to use metformin and clomiphene to induce ovulation. Current Outpatient Medications   Medication Sig    lisinopril-hydroCHLOROthiazide (PRINZIDE, ZESTORETIC) 20-12.5 mg per tablet Take 1 Tablet by mouth daily. propylthiouraciL (PTU) 50 mg tablet Take 2 Tablets by mouth three (3) times daily. naproxen (NAPROSYN) 500 mg tablet Take 1 Tablet by mouth every twelve (12) hours as needed for Pain. fluticasone propionate (FLONASE) 50 mcg/actuation nasal spray SPRAY 2 SPRAYS INTO EACH NOSTRIL EVERY DAY     No current facility-administered medications for this visit. Social Hx: living alone,  for Juan [employer] Aguirre [address] [employer]:  See HPI    Physical Examination:  Visit Vitals  /84   Pulse 85   Resp 16   Ht 5' 6\" (1.676 m)   Wt 237 lb (107.5 kg)   SpO2 95%   BMI 38.25 kg/m²       - GENERAL: NCAT, Appears well nourished   - EYES: EOMI, non-icteric, no proptosis   - Ear/Nose/Throat: Thyromegaly is present  - CARDIOVASCULAR: no cyanosis, no visible JVD   - RESPIRATORY: respiratory effort normal without any distress or labored breathing   - MUSCULOSKELETAL: Normal ROM of neck and upper extremities observed   - SKIN: No rash on face  - NEUROLOGIC:  No facial asymmetry (Cranial nerve 7 motor function), No gaze palsy   - PSYCHIATRIC: Normal affect, Normal insight and judgement     Data Reviewed:               Assessment/Plan: This is a very pleasant 72-year-old female presenting in follow-up for discussion related to Graves' disease. We initially started her on methimazole 20 mg twice daily for 1 month. With this dose, labs normalized. I then switched her over to PTU in anticipation of conception on April 16th 2021. With the increase in dosing from twice daily to 3 times daily, she missed some doses.   I subsequently increased the dose not knowing that she had missed doses. Will repeat labs 2 weeks after she has been taking 50 mg 3 times daily and attempt to down titrate to the lowest dose possible prior to conception. Reviewed the importance of having a normal thyroid function level especially during the first 20 weeks of gestation when the baby does not have their own thyroid. Discussed the risk of congenital defects with both PTU and methimazole, specifically aplasia cutis. Looking into details of the 14 cases, 7 children were diagnosed with a birth defect in the face and neck region (preauricular and branchial sinus/fistula/cyst) and 7 children had a birth defect in the urinary system (single cyst of kidney and hydronephrosis). Looking to obtain high normal thyroid function tests going into pregnancy. Recommend resuming folic acid supplement and switching lisinopril to something like labetalol.     #Graves' disease  -Methimazole 20 mg twice daily switched to PTU 50 mg 3 times daily on April 16 2021  -With the increase in frequency from 2 times daily to 3 times daily, patient missed some doses  -PTU increased to 100 mg 3 times daily on Jesica 15 2021, back down to 50 mg 3 times daily as of July 2022-hyperthyroid with low TSH when missing the third dose of the day  -Discussed the risk of congenital birth defects with methimazole and propylthiouracil (currently taking lisinopril HCTZ0  -recommended she ask PCP for alternative BP med given interest in pursuing fertility    #Anovulation  -Obtain lab work-up for PCOS  -If this is present, when thyroid function tests are appropriate for pregnancy and lisinopril is discontinued, folic acid has been taken for 2 months will initiate metformin    Copy sent to:Gunnar Loya MD      Return to care 2 mo  [facility]

## 2022-08-24 LAB
ALBUMIN SERPL-MCNC: 4.6 G/DL (ref 3.8–4.8)
ALBUMIN/GLOB SERPL: 1.8 {RATIO} (ref 1.2–2.2)
ALP SERPL-CCNC: 103 IU/L (ref 44–121)
ALT SERPL-CCNC: 13 IU/L (ref 0–32)
AST SERPL-CCNC: 14 IU/L (ref 0–40)
BILIRUB SERPL-MCNC: 0.4 MG/DL (ref 0–1.2)
BUN SERPL-MCNC: 6 MG/DL (ref 6–20)
BUN/CREAT SERPL: 11 (ref 9–23)
CALCIUM SERPL-MCNC: 9.1 MG/DL (ref 8.7–10.2)
CHLORIDE SERPL-SCNC: 102 MMOL/L (ref 96–106)
CO2 SERPL-SCNC: 23 MMOL/L (ref 20–29)
CREAT SERPL-MCNC: 0.53 MG/DL (ref 0.57–1)
DHEA-S SERPL-MCNC: 248 UG/DL (ref 84.8–378)
EGFR: 124 ML/MIN/1.73
FSH SERPL-ACNC: 4.9 MIU/ML
GLOBULIN SER CALC-MCNC: 2.6 G/DL (ref 1.5–4.5)
GLUCOSE SERPL-MCNC: 80 MG/DL (ref 65–99)
LH SERPL-ACNC: 14.1 MIU/ML
POTASSIUM SERPL-SCNC: 3.7 MMOL/L (ref 3.5–5.2)
PROLACTIN SERPL-MCNC: 6.8 NG/ML (ref 4.8–23.3)
PROT SERPL-MCNC: 7.2 G/DL (ref 6–8.5)
SODIUM SERPL-SCNC: 138 MMOL/L (ref 134–144)
T4 FREE SERPL-MCNC: 1.64 NG/DL (ref 0.82–1.77)
TESTOST FREE SERPL-MCNC: 3.1 PG/ML (ref 0–4.2)
TESTOST SERPL-MCNC: 54 NG/DL (ref 8–60)
TSH SERPL DL<=0.005 MIU/L-ACNC: <0.005 UIU/ML (ref 0.45–4.5)

## 2022-08-24 NOTE — LETTER
8/24/2022    Ms. Hoffmann [phone] [id number]      Dear Amber Sky:    Please find your most recent results below. Resulted Orders   METABOLIC PANEL, COMPREHENSIVE   Result Value Ref Range    Glucose 80 65 - 99 mg/dL    BUN 6 6 - 20 mg/dL    Creatinine 0.53 (L) 0.57 - 1.00 mg/dL    eGFR 124 >59 mL/min/1.73    BUN/Creatinine ratio 11 9 - 23    Sodium 138 134 - 144 mmol/L    Potassium 3.7 3.5 - 5.2 mmol/L    Chloride 102 96 - 106 mmol/L    CO2 23 20 - 29 mmol/L    Calcium 9.1 8.7 - 10.2 mg/dL    Protein, total 7.2 6.0 - 8.5 g/dL    Albumin 4.6 3.8 - 4.8 g/dL    GLOBULIN, TOTAL 2.6 1.5 - 4.5 g/dL    A-G Ratio 1.8 1.2 - 2.2    Bilirubin, total 0.4 0.0 - 1.2 mg/dL    Alk.  phosphatase 103 44 - 121 IU/L    AST (SGOT) 14 0 - 40 IU/L    ALT (SGPT) 13 0 - 32 IU/L    Narrative    Performed at:  [facility]  [address]  [id number]  : Sheila Whittington MD, Phone:  [phone]   [address] [place] [address] LH   Result Value Ref Range    Luteinizing hormone 14.1 mIU/mL    FSH 4.9 mIU/mL    Narrative    Performed at:  [address] [facility] [address]  [id number]  : Sheila Whittington MD, Phone:  [phone]   TESTOSTERONE, FREE & TOTAL   Result Value Ref Range    Testosterone 54 8 - 60 ng/dL    Free testosterone (Direct) 3.1 0.0 - 4.2 pg/mL    Narrative    Performed at:  [address] [facility] [address]  [id number]  : Sheila Whittington MD, Phone:  [phone]   T4, FREE   Result Value Ref Range    T4, Free 1.64 0.82 - 1.77 ng/dL    Narrative    Performed at:  [address] [facility]  [id number]  : Sheila Whittington MD, Phone:  [phone]   DHEA SULFATE   Result Value Ref Range    DHEA Sulfate 248.0 84.8 - 378.0 ug/dL    Narrative    Performed at:  [facility]  [address]  [id number]  : Sheila Whittington MD, Phone: [phone]   TSH 3RD GENERATION   Result Value Ref Range    TSH <0.005 (L) 0.450 - 4.500 uIU/mL    Narrative    Performed at:  [facility] [place], [place]  [id number]  : Fany Rosado MD, Phone:  [phone]   PROLACTIN   Result Value Ref Range    Prolactin 6.8 4.8 - 23.3 ng/mL    Narrative    Performed at:  [facility], [place], [place]  [id number]  : Fany Rosado MD, Phone:  [phone]       RECOMMENDATIONS:    Your thyroid level is perfect for going into a pregnancy. The TSH will come down several months after you're consistently taking PTU as you are prescribed. Continue the current regimen. Otherwise, workup for anovulation does show a high normal testosterone which is consistent with PCOS. I do think metformin will help you get pregnant. If it doesn't, you'll need letrozole which a Reproductive Endocrinologist will prescribe. The normal [address] [place] [address] does indicate that your ovaries are still making eggs so that's good news. Liver, kidney, electrolyte levels are normal.    Let me know when you've been taking your prenatal for 2 months and I'll prescribe metformin. Please also call my office for a follow up as you do not have one right now.       Sincerely,      Juan Ontiveros MD

## 2023-01-03 ENCOUNTER — OFFICE VISIT (OUTPATIENT)
Dept: ENDOCRINOLOGY | Age: 38
End: 2023-01-03
Payer: MEDICAID

## 2023-01-03 VITALS
DIASTOLIC BLOOD PRESSURE: 81 MMHG | SYSTOLIC BLOOD PRESSURE: 126 MMHG | OXYGEN SATURATION: 98 % | RESPIRATION RATE: 16 BRPM | HEIGHT: 66 IN | WEIGHT: 235 LBS | BODY MASS INDEX: 37.77 KG/M2 | HEART RATE: 92 BPM

## 2023-01-03 DIAGNOSIS — Z31.69 ENCOUNTER FOR PRECONCEPTION CONSULTATION: ICD-10-CM

## 2023-01-03 DIAGNOSIS — E05.00 GRAVES DISEASE: ICD-10-CM

## 2023-01-03 DIAGNOSIS — E05.00 GRAVES DISEASE: Primary | ICD-10-CM

## 2023-01-03 DIAGNOSIS — I10 PRIMARY HYPERTENSION: ICD-10-CM

## 2023-01-03 DIAGNOSIS — F17.210 CIGARETTE NICOTINE DEPENDENCE WITHOUT COMPLICATION: ICD-10-CM

## 2023-01-03 PROCEDURE — 3074F SYST BP LT 130 MM HG: CPT | Performed by: INTERNAL MEDICINE

## 2023-01-03 PROCEDURE — 3079F DIAST BP 80-89 MM HG: CPT | Performed by: INTERNAL MEDICINE

## 2023-01-03 PROCEDURE — 99214 OFFICE O/P EST MOD 30 MIN: CPT | Performed by: INTERNAL MEDICINE

## 2023-01-03 RX ORDER — IBUPROFEN 200 MG
1 TABLET ORAL EVERY 24 HOURS
Qty: 90 PATCH | Refills: 0 | Status: SHIPPED | OUTPATIENT
Start: 2023-01-03 | End: 2023-02-02

## 2023-01-03 RX ORDER — METFORMIN HYDROCHLORIDE 500 MG/1
500 TABLET, EXTENDED RELEASE ORAL
Qty: 90 TABLET | Refills: 1 | Status: SHIPPED | OUTPATIENT
Start: 2023-01-03 | End: 2023-12-29

## 2023-01-03 RX ORDER — LABETALOL 100 MG/1
100 TABLET, FILM COATED ORAL 2 TIMES DAILY
Qty: 180 TABLET | Refills: 2 | Status: SHIPPED | OUTPATIENT
Start: 2023-01-03

## 2023-01-03 NOTE — PROGRESS NOTES
Chief Complaint   Patient presents with    Thyroid Problem    Follow-up     History of Present Illness: Amber Sky is a 30 y.o. female with a past medical hx significant for HTN, headaches presenting in referral from Ana Nava MD for a low TSH. Went to have a physical in December who noticed she had a large thyroid. Reports that she's never noticed thyroid enlargement until December. Works as schoolbus . Notes tremor, has had that for a few months. Thought she was just nervous. Has gained 40 lbs- since September- before this started was 145, now 185 - is 6\"6. Just started taking biotin 1 week ago- is taking 1 2500mcg of biotin. Was taking B and C complex vitamins- b12. No iodine. No CT scans with contrast. Does endorse some pain when turns to the left, numbness in the R cheek. Chokes on liquids. Is interested in pursuing fertility. Going back to school on Feb 22nd- end of March. Experienced an ectopic pregnancy 12 years ago and has been trying to conceive for the past 5 years without success. Has seen fertility specialists regarding this. Menstrual periods are very irregular. Is actively pursuing fertility at this time. 03/17/2021: Has been taking 4 a day- has been doing that since I sent in rx. Doesn't hurt when she moves her hand, still has a little shake. It is not as bad. Still forgets everything. PCP recently started lisinopril/hctz- was not told number. 153/101 in the office- does not check at home. 04/22/2021: Switched over to PTU 04/14, repeat labs are due May 3rd. Boyfriend has semena nalysis pending next week.     06/28/2021:Hasn't been taking med 3x daily- got back on track last week. Forgot to email me back. Went back to repro when she was on a cycle- waiting of boyfriend to do the semen analysis, labs.     07/27/2022: Still interested in pursuing fertility, previous relationship did end. Missing the evening dose of PTU frequently.   Not taking a prenatal vitamin. Still taking lisinopril. Previously used LH test strips for 3 months and never ovulated. Family members previously have had to use metformin and clomiphene to induce ovulation. 01/03/2022: Has been consistently taking 100 mg of PTU 3 times a day. Currently taking a prenatal vitamin. Interested in stopping smoking, 15year-old son has encouraged her to do so. Smoking 1 pack/day currently. Did ovulate with LH test strips previously. Has never taken labetalol or metformin. Reports that she tried to get in with her PCP but cannot get an appointment. Current Outpatient Medications   Medication Sig    propylthiouraciL (PTU) 50 mg tablet Take 2 Tablets by mouth three (3) times daily. lisinopril-hydroCHLOROthiazide (PRINZIDE, ZESTORETIC) 20-12.5 mg per tablet Take 1 Tablet by mouth daily. No current facility-administered medications for this visit. Social Hx: living alone,  for Javier 63:  See HPI    Physical Examination:  Visit Vitals  Wt 235 lb (106.6 kg)   BMI 37.93 kg/m²       - GENERAL: NCAT, Appears well nourished   - EYES: EOMI, non-icteric, no proptosis   - Ear/Nose/Throat: Thyromegaly is present  - CARDIOVASCULAR: no cyanosis, no visible JVD   - RESPIRATORY: respiratory effort normal without any distress or labored breathing   - MUSCULOSKELETAL: Normal ROM of neck and upper extremities observed   - SKIN: No rash on face  - NEUROLOGIC:  No facial asymmetry (Cranial nerve 7 motor function), No gaze palsy   - PSYCHIATRIC: Normal affect, Normal insight and judgement     Data Reviewed:               Assessment/Plan: This is a very pleasant 29-year-old female presenting in follow-up for discussion related to Graves' disease and PCOS in the preconception setting. We initially started her on methimazole 20 mg twice daily for 1 month. With this dose, labs normalized.   I then switched her over to PTU in anticipation of conception on April 16th 2021.  With the increase in dosing from twice daily to 3 times daily, she missed some doses. I subsequently increased the dose not knowing that she had missed doses. Will repeat labs 2 weeks after she has been taking 50 mg 3 times daily and attempt to down titrate to the lowest dose possible prior to conception. Reviewed the importance of having a normal thyroid function level especially during the first 20 weeks of gestation when the baby does not have their own thyroid. Discussed the risk of congenital defects with both PTU and methimazole, specifically aplasia cutis. Looking into details of the 14 cases, 7 children were diagnosed with a birth defect in the face and neck region (preauricular and branchial sinus/fistula/cyst) and 7 children had a birth defect in the urinary system (single cyst of kidney and hydronephrosis). Looking to obtain high normal thyroid function tests going into pregnancy. Recommend resuming folic acid supplement and switching lisinopril to something like labetalol. As of January 2023, repeat labs on  mg 3 times a day, switch from lisinopril HCTZ to labetalol, add metformin 500 mg extended release with dinner. Prescription sent in for nicotine patches. Has consistently been taking prenatal vitamin. Aware that I would like to see labs when pregnancy is discovered.     #Graves' disease  -Methimazole 20 mg twice daily switched to PTU 50 mg 3 times daily on April 16 2021  -With the increase in frequency from 2 times daily to 3 times daily, patient missed some doses  -PTU increased to 100 mg 3 times daily on Jesica 15 2021, back down to 50 mg 3 times daily as of July 2022-hyperthyroid with low TSH when missing the third dose of the day  -Discussed the risk of congenital birth defects with methimazole and propylthiouracil (currently taking lisinopril HCTZ)    #HTN  -Switch from lisinopril HCTZ 20/12.5 mg  to 100mg twice a day of labetalol - up titrate as needed to 800mg total daily    #Anovulation  -labs consistent with PCOS  -trial 500mg metformin qhs to enhance monthly ovulation    #nicotine dependence  -rx nicotine patch 21mg as is currently smoking 1PPD    Copy sent to:Linden Loya MD      Return to care next avail or when pregnancy occurs    Sahara Cifuentes, [facility]

## 2023-01-04 LAB
ALBUMIN SERPL-MCNC: 4.4 G/DL (ref 3.8–4.8)
ALBUMIN/GLOB SERPL: 1.6 {RATIO} (ref 1.2–2.2)
ALP SERPL-CCNC: 105 IU/L (ref 44–121)
ALT SERPL-CCNC: 10 IU/L (ref 0–32)
AST SERPL-CCNC: 16 IU/L (ref 0–40)
BILIRUB SERPL-MCNC: 0.3 MG/DL (ref 0–1.2)
BUN SERPL-MCNC: 7 MG/DL (ref 6–20)
BUN/CREAT SERPL: 10 (ref 9–23)
CALCIUM SERPL-MCNC: 8.1 MG/DL (ref 8.7–10.2)
CHLORIDE SERPL-SCNC: 102 MMOL/L (ref 96–106)
CO2 SERPL-SCNC: 21 MMOL/L (ref 20–29)
CREAT SERPL-MCNC: 0.67 MG/DL (ref 0.57–1)
EGFR: 117 ML/MIN/1.73
GLOBULIN SER CALC-MCNC: 2.7 G/DL (ref 1.5–4.5)
GLUCOSE SERPL-MCNC: 67 MG/DL (ref 70–99)
POTASSIUM SERPL-SCNC: 3.4 MMOL/L (ref 3.5–5.2)
PROT SERPL-MCNC: 7.1 G/DL (ref 6–8.5)
SODIUM SERPL-SCNC: 141 MMOL/L (ref 134–144)
T3 SERPL-MCNC: 206 NG/DL (ref 71–180)
T4 FREE SERPL-MCNC: 1.32 NG/DL (ref 0.82–1.77)
TSH SERPL DL<=0.005 MIU/L-ACNC: <0.005 UIU/ML (ref 0.45–4.5)
TSI SER-ACNC: 4.66 IU/L (ref 0–0.55)

## 2023-01-06 DIAGNOSIS — E05.00 GRAVES DISEASE: ICD-10-CM

## 2023-01-06 DIAGNOSIS — E05.00 GRAVES DISEASE: Primary | ICD-10-CM

## 2023-01-06 NOTE — LETTER
1/6/2023    Ms. Hoffmann 35 [phone]      Dear Nohemy Mg:    Please find your most recent results below. Resulted Orders   METABOLIC PANEL, COMPREHENSIVE   Result Value Ref Range    Glucose 67 (L) 70 - 99 mg/dL    BUN 7 6 - 20 mg/dL    Creatinine 0.67 0.57 - 1.00 mg/dL    eGFR 117 >59 mL/min/1.73    BUN/Creatinine ratio 10 9 - 23    Sodium 141 134 - 144 mmol/L    Potassium 3.4 (L) 3.5 - 5.2 mmol/L    Chloride 102 96 - 106 mmol/L    CO2 21 20 - 29 mmol/L    Calcium 8.1 (L) 8.7 - 10.2 mg/dL    Protein, total 7.1 6.0 - 8.5 g/dL    Albumin 4.4 3.8 - 4.8 g/dL    GLOBULIN, TOTAL 2.7 1.5 - 4.5 g/dL    A-G Ratio 1.6 1.2 - 2.2    Bilirubin, total 0.3 0.0 - 1.2 mg/dL    Alk.  phosphatase 105 44 - 121 IU/L    AST (SGOT) 16 0 - 40 IU/L    ALT (SGPT) 10 0 - 32 IU/L    Narrative    Performed at:  [facility] [address]  [id number]  : Rustam Parnell MD, Phone:  [phone]   T4, FREE   Result Value Ref Range    T4, Free 1.32 0.82 - 1.77 ng/dL    Narrative    Performed at:  [address]  [id number]  : Rustam Parnell MD, Phone:  [phone]   TSH 3RD GENERATION   Result Value Ref Range    TSH <0.005 (L) 0.450 - 4.500 uIU/mL    Narrative    Performed at:  [facility] [address]  [id number]  : Rustam Parnell MD, Phone:  [phone]   THYROID STIMULATING IMMUNOGLOBULIN   Result Value Ref Range    Thyroid Stim Immunoglobulin 4.66 (H) 0.00 - 0.55 IU/L    Narrative    Performed at:  [address]  [id number]  : Rustam Parnell MD, Phone:  [phone]   T3 TOTAL   Result Value Ref Range    T3, total 206 (H) 71 - 180 ng/dL    Narrative    Performed at:  [address] [facility] [address], [address] [place] [address]  [id number]  : Rustam Parnell MD, Phone:  [phone] RECOMMENDATIONS:    Your thyroid remains just slightly overactive which is what we prefer going into a pregnancy.  Continue 100mg three times daily of PTU and let me know if you have a positive pregnancy test.    Please call me if you have any questions: [phone]    Sincerely,      Denver Kate MD

## 2023-01-13 ENCOUNTER — HOSPITAL ENCOUNTER (EMERGENCY)
Age: 38
Discharge: HOME | End: 2023-01-13
Attending: EMERGENCY MEDICINE
Payer: MEDICAID

## 2023-01-13 VITALS
HEART RATE: 60 BPM | BODY MASS INDEX: 37.77 KG/M2 | RESPIRATION RATE: 17 BRPM | TEMPERATURE: 98.1 F | WEIGHT: 235 LBS | HEIGHT: 66 IN | DIASTOLIC BLOOD PRESSURE: 96 MMHG | SYSTOLIC BLOOD PRESSURE: 164 MMHG | OXYGEN SATURATION: 99 %

## 2023-01-13 DIAGNOSIS — I10 ACCELERATED HYPERTENSION: Primary | ICD-10-CM

## 2023-01-13 DIAGNOSIS — G43.909 MIGRAINE WITHOUT STATUS MIGRAINOSUS, NOT INTRACTABLE, UNSPECIFIED MIGRAINE TYPE: ICD-10-CM

## 2023-01-13 DIAGNOSIS — G44.209 ACUTE NON INTRACTABLE TENSION-TYPE HEADACHE: ICD-10-CM

## 2023-01-13 DIAGNOSIS — R51.9 OCCIPITAL HEADACHE: ICD-10-CM

## 2023-01-13 LAB
ALBUMIN SERPL-MCNC: 3.4 G/DL (ref 3.5–5)
ALBUMIN/GLOB SERPL: 0.9 (ref 1.1–2.2)
ALP SERPL-CCNC: 88 U/L (ref 45–117)
ALT SERPL-CCNC: 17 U/L (ref 12–78)
ANION GAP SERPL CALC-SCNC: 3 MMOL/L (ref 5–15)
AST SERPL-CCNC: 27 U/L (ref 15–37)
BASOPHILS # BLD: 0 K/UL (ref 0–0.1)
BASOPHILS NFR BLD: 1 % (ref 0–1)
BILIRUB SERPL-MCNC: 0.5 MG/DL (ref 0.2–1)
BUN SERPL-MCNC: 5 MG/DL (ref 6–20)
BUN/CREAT SERPL: 7 (ref 12–20)
CALCIUM SERPL-MCNC: 8.9 MG/DL (ref 8.5–10.1)
CHLORIDE SERPL-SCNC: 106 MMOL/L (ref 97–108)
CO2 SERPL-SCNC: 28 MMOL/L (ref 21–32)
CREAT SERPL-MCNC: 0.69 MG/DL (ref 0.55–1.02)
DIFFERENTIAL METHOD BLD: ABNORMAL
EOSINOPHIL # BLD: 0.1 K/UL (ref 0–0.4)
EOSINOPHIL NFR BLD: 2 % (ref 0–7)
ERYTHROCYTE [DISTWIDTH] IN BLOOD BY AUTOMATED COUNT: 13.6 % (ref 11.5–14.5)
GLOBULIN SER CALC-MCNC: 4 G/DL (ref 2–4)
GLUCOSE SERPL-MCNC: 98 MG/DL (ref 65–100)
HCT VFR BLD AUTO: 36 % (ref 35–47)
HGB BLD-MCNC: 12.2 G/DL (ref 11.5–16)
IMM GRANULOCYTES # BLD AUTO: 0 K/UL (ref 0–0.04)
IMM GRANULOCYTES NFR BLD AUTO: 0 % (ref 0–0.5)
LYMPHOCYTES # BLD: 3 K/UL (ref 0.8–3.5)
LYMPHOCYTES NFR BLD: 42 % (ref 12–49)
MCH RBC QN AUTO: 26.5 PG (ref 26–34)
MCHC RBC AUTO-ENTMCNC: 33.9 G/DL (ref 30–36.5)
MCV RBC AUTO: 78.1 FL (ref 80–99)
MONOCYTES # BLD: 0.5 K/UL (ref 0–1)
MONOCYTES NFR BLD: 7 % (ref 5–13)
NEUTS SEG # BLD: 3.6 K/UL (ref 1.8–8)
NEUTS SEG NFR BLD: 48 % (ref 32–75)
NRBC # BLD: 0 K/UL (ref 0–0.01)
NRBC BLD-RTO: 0 PER 100 WBC
PLATELET # BLD AUTO: 235 K/UL (ref 150–400)
PMV BLD AUTO: 9.9 FL (ref 8.9–12.9)
POTASSIUM SERPL-SCNC: 4.2 MMOL/L (ref 3.5–5.1)
PROT SERPL-MCNC: 7.4 G/DL (ref 6.4–8.2)
RBC # BLD AUTO: 4.61 M/UL (ref 3.8–5.2)
SODIUM SERPL-SCNC: 137 MMOL/L (ref 136–145)
WBC # BLD AUTO: 7.3 K/UL (ref 3.6–11)

## 2023-01-13 PROCEDURE — 74011250636 HC RX REV CODE- 250/636: Performed by: EMERGENCY MEDICINE

## 2023-01-13 PROCEDURE — 96375 TX/PRO/DX INJ NEW DRUG ADDON: CPT

## 2023-01-13 PROCEDURE — 85025 COMPLETE CBC W/AUTO DIFF WBC: CPT

## 2023-01-13 PROCEDURE — 99284 EMERGENCY DEPT VISIT MOD MDM: CPT

## 2023-01-13 PROCEDURE — 96374 THER/PROPH/DIAG INJ IV PUSH: CPT

## 2023-01-13 PROCEDURE — 80053 COMPREHEN METABOLIC PANEL: CPT

## 2023-01-13 PROCEDURE — 74011250637 HC RX REV CODE- 250/637: Performed by: EMERGENCY MEDICINE

## 2023-01-13 PROCEDURE — 36415 COLL VENOUS BLD VENIPUNCTURE: CPT

## 2023-01-13 RX ORDER — KETOROLAC TROMETHAMINE 30 MG/ML
30 INJECTION, SOLUTION INTRAMUSCULAR; INTRAVENOUS
Status: COMPLETED | OUTPATIENT
Start: 2023-01-13 | End: 2023-01-13

## 2023-01-13 RX ORDER — AMLODIPINE BESYLATE 10 MG/1
10 TABLET ORAL DAILY
Qty: 20 TABLET | Refills: 0 | Status: SHIPPED | OUTPATIENT
Start: 2023-01-13 | End: 2023-02-02

## 2023-01-13 RX ORDER — AMLODIPINE BESYLATE 5 MG/1
10 TABLET ORAL DAILY
Status: DISCONTINUED | OUTPATIENT
Start: 2023-01-14 | End: 2023-01-13

## 2023-01-13 RX ORDER — BUTALBITAL, ACETAMINOPHEN AND CAFFEINE 50; 325; 40 MG/1; MG/1; MG/1
1 TABLET ORAL
Status: COMPLETED | OUTPATIENT
Start: 2023-01-13 | End: 2023-01-13

## 2023-01-13 RX ORDER — PROCHLORPERAZINE EDISYLATE 5 MG/ML
10 INJECTION INTRAMUSCULAR; INTRAVENOUS ONCE
Status: COMPLETED | OUTPATIENT
Start: 2023-01-13 | End: 2023-01-13

## 2023-01-13 RX ORDER — AMLODIPINE BESYLATE 5 MG/1
10 TABLET ORAL ONCE
Status: COMPLETED | OUTPATIENT
Start: 2023-01-13 | End: 2023-01-13

## 2023-01-13 RX ORDER — BUTALBITAL, ACETAMINOPHEN AND CAFFEINE 300; 40; 50 MG/1; MG/1; MG/1
1 CAPSULE ORAL
Qty: 20 CAPSULE | Refills: 0 | Status: SHIPPED | OUTPATIENT
Start: 2023-01-13

## 2023-01-13 RX ORDER — DIPHENHYDRAMINE HYDROCHLORIDE 50 MG/ML
25 INJECTION, SOLUTION INTRAMUSCULAR; INTRAVENOUS
Status: COMPLETED | OUTPATIENT
Start: 2023-01-13 | End: 2023-01-13

## 2023-01-13 RX ADMIN — BUTALBITAL, ACETAMINOPHEN, AND CAFFEINE 1 TABLET: 50; 325; 40 TABLET ORAL at 19:46

## 2023-01-13 RX ADMIN — AMLODIPINE BESYLATE 10 MG: 5 TABLET ORAL at 19:55

## 2023-01-13 RX ADMIN — DIPHENHYDRAMINE HYDROCHLORIDE 25 MG: 50 INJECTION, SOLUTION INTRAMUSCULAR; INTRAVENOUS at 19:46

## 2023-01-13 RX ADMIN — PROCHLORPERAZINE EDISYLATE 10 MG: 5 INJECTION INTRAMUSCULAR; INTRAVENOUS at 19:46

## 2023-01-13 RX ADMIN — KETOROLAC TROMETHAMINE 30 MG: 30 INJECTION, SOLUTION INTRAMUSCULAR at 19:46

## 2023-01-14 NOTE — ED PROVIDER NOTES
EMERGENCY DEPARTMENT HISTORY AND PHYSICAL EXAM             Please note that this dictation was completed with the assistance of \"Dragon\", the computer voice recognition software. Quite often unanticipated grammatical, syntax, homophones, and other interpretive errors are inadvertently transcribed by the computer software. Please disregard these errors and any errors that have escaped final proofreading. Thank you. Date of Evaluation: 01/14/23  Patient: Eric Lucero  Patient Age and Sex: 30 y.o. female   MRN: [id number]  CSN: [id number]  PCP: Manisha Belle MD    History of Present Illness     Chief Complaint   Patient presents with    Hypertension     Posterior headache (left) and elevated BP today. 168/103 and 138/100. Pt reports endocrinologist changed BP med on Monday. MD sent pt here today. History Provided By: Patient/family/EMS (if available)    HPI Limitations : None    HPI: Eric Lucero, 30 y.o. female with past medical history as documented below presents to the ED with c/o of one day hx of moderate intensity occipital HA with elevated BP readings. States she was originally on lisinopril for HTN but her Endocrinologist, Dr. Latasha Cuadra, switched it to PO Labetalol due to fertility concerns. Pt reports ROWLAND is progressively getting worse. Not the worst HA ever. No neuro complaints. No CP or SOB. Pt denies any other exacerbating or ameliorating factors. There are no other complaints, changes or physical findings pertinent to the HPI at this time. Nursing Notes were all reviewed and agreed with or any disagreements were addressed in the HPI.     Past History   Past Medical History:  Past Medical History:   Diagnosis Date    Goiter     Headache     Hypertension        Past Surgical History:  Past Surgical History:   Procedure Laterality Date    HX GYN      ectopic pregnancy - lapartomy       Family History:   Family history reviewed and was non-contributory, unless specified below:  Family History   Problem Relation Age of Onset    Hypertension Mother     Migraines Mother     Cancer Maternal Grandmother         brain tumor       Social History:  Social History     Tobacco Use    Smoking status: Every Day     Packs/day: 0.25     Types: Cigarettes    Smokeless tobacco: Never    Tobacco comments:     2-3 cigarettes/day   Vaping Use    Vaping Use: Never used   Substance Use Topics    Alcohol use: Yes     Alcohol/week: 1.0 standard drink     Types: 1 Glasses of wine per week     Comment: occassionally    Drug use: No       Allergies: Allergies   Allergen Reactions    Nuts [Tree Nut] Anaphylaxis and Shortness of Breath       Current Medications:  No current facility-administered medications on file prior to encounter. Current Outpatient Medications on File Prior to Encounter   Medication Sig Dispense Refill    PNV LF.02/ZEJSVSU fum/folic ac (PRENATAL PO) Take  by mouth. nicotine (NICODERM CQ) 21 mg/24 hr 1 Patch by TransDERmal route every twenty-four (24) hours for 30 days. 90 Patch 0    labetaloL (NORMODYNE) 100 mg tablet Take 1 Tablet by mouth two (2) times a day. 180 Tablet 2    metFORMIN ER (GLUCOPHAGE XR) 500 mg tablet Take 1 Tablet by mouth daily (with dinner) for 360 days. 90 Tablet 1    propylthiouraciL (PTU) 50 mg tablet Take 2 Tablets by mouth three (3) times daily. 180 Tablet 5       Review of Systems   A complete ROS was reviewed by me today and all other systems negative, unless otherwise specified below:  Review of Systems   Constitutional: Negative. Negative for chills and fever. HENT: Negative. Negative for congestion and sore throat. Eyes: Negative. Respiratory: Negative. Negative for cough, chest tightness, shortness of breath and wheezing. Cardiovascular: Negative. Negative for chest pain, palpitations and leg swelling. Gastrointestinal: Negative.   Negative for abdominal distention, abdominal pain, blood in stool, constipation, diarrhea, nausea and vomiting. Endocrine: Negative. Genitourinary: Negative. Negative for difficulty urinating, dysuria, flank pain, frequency, hematuria and urgency. Musculoskeletal: Negative. Negative for back pain and neck stiffness. Skin: Negative. Negative for rash. Allergic/Immunologic: Negative. Neurological:  Positive for headaches. Negative for dizziness, syncope, weakness, light-headedness and numbness. Hematological: Negative. Psychiatric/Behavioral: Negative. Negative for confusion and self-injury. Physical Exam   Patient Vitals for the past 24 hrs:   Temp Pulse Resp BP SpO2   01/13/23 1955 -- 60 -- (!) 164/96 --   01/13/23 1912 -- 68 17 (!) 148/103 99 %   01/13/23 1651 98.1 °F (36.7 °C) 77 16 (!) 152/101 98 %       Physical Exam  Vitals and nursing note reviewed. Constitutional:       General: She is not in acute distress. Appearance: She is well-developed. She is not diaphoretic. HENT:      Head: Normocephalic and atraumatic. Mouth/Throat:      Pharynx: No oropharyngeal exudate. Eyes:      Conjunctiva/sclera: Conjunctivae normal.      Pupils: Pupils are equal, round, and reactive to light. Cardiovascular:      Rate and Rhythm: Normal rate and regular rhythm. Heart sounds: Normal heart sounds. No murmur heard. No friction rub. No gallop. Pulmonary:      Effort: Pulmonary effort is normal. No respiratory distress. Breath sounds: Normal breath sounds. No wheezing or rales. Chest:      Chest wall: No tenderness. Abdominal:      General: Bowel sounds are normal. There is no distension. Palpations: Abdomen is soft. There is no mass. Tenderness: There is no abdominal tenderness. There is no guarding or rebound. Musculoskeletal:         General: No tenderness or deformity. Normal range of motion. Cervical back: Normal range of motion. Skin:     General: Skin is warm. Findings: No rash.    Neurological:      Mental Status: She is alert and oriented to person, place, and time. Cranial Nerves: No cranial nerve deficit. Motor: No abnormal muscle tone. Coordination: Coordination normal.      Deep Tendon Reflexes: Reflexes normal.     Diagnostic Studies     LABORATORY RESULTS:  I have personally reviewed and interpreted all available laboratory results. Recent Results (from the past 24 hour(s))   CBC WITH AUTOMATED DIFF    Collection Time: 01/13/23  5:00 PM   Result Value Ref Range    WBC 7.3 3.6 - 11.0 K/uL    RBC 4.61 3.80 - 5.20 M/uL    HGB 12.2 11.5 - 16.0 g/dL    HCT 36.0 35.0 - 47.0 %    MCV 78.1 (L) 80.0 - 99.0 FL    MCH 26.5 26.0 - 34.0 PG    MCHC 33.9 30.0 - 36.5 g/dL    RDW 13.6 11.5 - 14.5 %    PLATELET 276 472 - 382 K/uL    MPV 9.9 8.9 - 12.9 FL    NRBC 0.0 0  WBC    ABSOLUTE NRBC 0.00 0.00 - 0.01 K/uL    NEUTROPHILS 48 32 - 75 %    LYMPHOCYTES 42 12 - 49 %    MONOCYTES 7 5 - 13 %    EOSINOPHILS 2 0 - 7 %    BASOPHILS 1 0 - 1 %    IMMATURE GRANULOCYTES 0 0.0 - 0.5 %    ABS. NEUTROPHILS 3.6 1.8 - 8.0 K/UL    ABS. LYMPHOCYTES 3.0 0.8 - 3.5 K/UL    ABS. MONOCYTES 0.5 0.0 - 1.0 K/UL    ABS. EOSINOPHILS 0.1 0.0 - 0.4 K/UL    ABS. BASOPHILS 0.0 0.0 - 0.1 K/UL    ABS. IMM. GRANS. 0.0 0.00 - 0.04 K/UL    DF AUTOMATED     METABOLIC PANEL, COMPREHENSIVE    Collection Time: 01/13/23  5:00 PM   Result Value Ref Range    Sodium 137 136 - 145 mmol/L    Potassium 4.2 3.5 - 5.1 mmol/L    Chloride 106 97 - 108 mmol/L    CO2 28 21 - 32 mmol/L    Anion gap 3 (L) 5 - 15 mmol/L    Glucose 98 65 - 100 mg/dL    BUN 5 (L) 6 - 20 MG/DL    Creatinine 0.69 0.55 - 1.02 MG/DL    BUN/Creatinine ratio 7 (L) 12 - 20      eGFR >60 >60 ml/min/1.73m2    Calcium 8.9 8.5 - 10.1 MG/DL    Bilirubin, total 0.5 0.2 - 1.0 MG/DL    ALT (SGPT) 17 12 - 78 U/L    AST (SGOT) 27 15 - 37 U/L    Alk.  phosphatase 88 45 - 117 U/L    Protein, total 7.4 6.4 - 8.2 g/dL    Albumin 3.4 (L) 3.5 - 5.0 g/dL    Globulin 4.0 2.0 - 4.0 g/dL    A-G Ratio 0.9 (L) 1.1 - 2.2 RADIOLOGY RESULTS:  I have personally reviewed and interpreted all available imaging studies and agree with radiology interpretation. No orders to display     CT Results  (Last 48 hours)      None          CXR Results  (Last 48 hours)      None          No orders to display        [address]   I am the first and primary ED physician for this patient's ED visit today. I reviewed our EMR for any past records that may contribute to the patient's current condition, including their past medical, surgical, social and family history. This also includes their most recent ED visits, previous hospitalizations and prior diagnostic data. I have reviewed and summarized the most pertinent findings in my HPI and MDM. Vital Signs Reviewed:  Patient Vitals for the past 24 hrs:   Temp Pulse Resp BP SpO2   01/13/23 1955 -- 60 -- (!) 164/96 --   01/13/23 1912 -- 68 17 (!) 148/103 99 %   01/13/23 1651 98.1 °F (36.7 °C) 77 16 (!) 152/101 98 %     Pulse Oximetry Analysis: 98% on RA with good pleth    Cardiac Monitor:   Rate: 77 bpm  The cardiac monitor revealed the following rhythm as interpreted by me: Normal Sinus Rhythm  Cardiac monitoring was ordered to monitor patient for signs of cardiac dysrhythmia, which they are at risk for based on their history and/or risk for cardiovascular disease and/or metabolic abnormalities. Records Reviewed: Nursing Notes, Old Medical Records, Previous electrocardiograms, Previous Radiology Studies and Previous Laboratory Studies, EMS reports    DIFFERENTIAL DIAGNOSIS AND MDM:  Pt presents with acute headache; afebrile with stable vitals; exam is without focal deficits. DDx: migraine, tension headache, cluster HA, stress, hypertensive urgency, dehydration. HA was gradual onset, pt neuro intact, no nausea/vomiting/focal weakness/sensory change to suggest CVA or ICH. Also pt is not immunocompromised, no fever, no focal weakness to suggest brain abscess. Therefore, no CT head. No neck stiffness, fever, AMS, photophobia to suggest meningitis. Neg kernig and Brudzinski. Therefore no LP. No jaw claudication, visual changes or temporal pain to suggest temporal arteritis, therefore no ESR/CRP. No eye pain, PERRL, no red eye to suggest glaucoma. No risk factors for CO. Will treat pain and reassess. Review of Prior Records and External Documents:   reviewed: YES - I have reviewed the patient's controlled substance prescription history thru the Prescription Monitoring Program so that the prescription(s) for a controlled substance can be given. Prior hospital discharge summaries and clinic notes reviewed: Reviewed Endocrine notes from 1/3/23. Pt on methimazole for Graves disease. Pt had her lisinopril HCTZ 20/12.5 mg witched to 100mg twice a day of labetalol. ED Course: Progress Notes, Reevaluation, and Consults:  Initial assessment performed. I discussed presenting problems and concerns, and my formulated plan for today's visit with the patient and any available family members. I have encouraged them to ask questions as they arise throughout the visit.      ED Physician Orders:   Orders Placed This Encounter    CBC WITH AUTOMATED DIFF    METABOLIC PANEL, COMPREHENSIVE    DISCONTD: amLODIPine (NORVASC) tablet 10 mg    ketorolac (TORADOL) injection 30 mg    butalbital-acetaminophen-caffeine (FIORICET, ESGIC) -40 mg per tablet 1 Tablet    diphenhydrAMINE (BENADRYL) injection 25 mg    prochlorperazine (COMPAZINE) injection 10 mg    amLODIPine (NORVASC) tablet 10 mg    amLODIPine (Norvasc) 10 mg tablet    butalbital-acetaminophen-caff (Fioricet) -40 mg per capsule     ED Medications Given:   Medications   ketorolac (TORADOL) injection 30 mg (30 mg IntraVENous Given 1/13/23 1946)   butalbital-acetaminophen-caffeine (FIORICET, ESGIC) -40 mg per tablet 1 Tablet (1 Tablet Oral Given 1/13/23 1946)   diphenhydrAMINE (BENADRYL) injection 25 mg (25 mg IntraVENous Given 1/13/23 1946)   prochlorperazine (COMPAZINE) injection 10 mg (10 mg IntraVENous Given 1/13/23 1946)   amLODIPine (NORVASC) tablet 10 mg (10 mg Oral Given 1/13/23 1955)       ED Physician Interpretation of Test Results: All results were independently reviewed and interpreted by myself, notably showing:     RADIOLOGY:  Non-plain film images such as CT, ultrasound and MRI are read by the radiologist. Plain radiographic images are visualized and preliminarily interpreted by the ED Provider with the below findings:     Imaging interpreted by me:     Interpretation per the Radiologist below, if available at the time of this note:     No orders to display     CT Results  (Last 48 hours)      None          CXR Results  (Last 48 hours)      None          No orders to display       ED physician interpretation of EKG: No STEMI. See my EKG interpretation in ED course above. ED physician interpretation of laboratory results:     Laboratory data interpreted by me: CBC with WBC 7.3, Hgb 12.2, CMP with normal kidney function, electrolytes WNL    Progress Note:  I have just re-evaluated the patient. Pt reports improvement of her symptoms. I have reviewed her vital signs and determined there is currently no worsening in their condition or physical exam. Results have been reviewed with them and their questions have been answered. I will continue to review further results as they come available. Reassessments:    Medical Decision Making  Amount and/or Complexity of Data Reviewed  Labs: ordered. Risk  Prescription drug management. Shared Decision Making: I considered performing CT head but did not after shared decision making with patient due to risks of radiation and improvement of HA after improvement of BP; no neuro findings or red flags concerning for intracranial pathology. TOBACCO COUNSELING:  Upon evaluation, pt expressed that they are a current tobacco user.  For approximately 3-5 mins, pt has been counseled on the dangers of smoking and was encouraged to quit as soon as possible in order to decrease further risks to their health. Pt has conveyed their understanding of the risks involved should they continue to use tobacco products. Disposition:  DISCHARGE  Pt reassessed and symptoms noted to have improved significantly after ED treatment. Arvin Sanchez's labs and imaging have been reviewed with her and available family. She verbally conveys understanding and agreement of the signs, symptoms, diagnosis, treatment and prognosis and additionally agrees to follow up as recommended with Dr. Kecia Meneses MD and/or specialist as instructed. She agrees with the care plan we have created and conveys that all of her questions have been answered. Additionally, I have put together a packet of discharge instructions for her that include: 1) Educational information regarding their diagnosis, 2) How to care for their diagnosis at home, as well a 3) List of reasons why they would want to return to the ED prior to their follow-up appointment should their condition change or symptoms worsen. I have answered all questions to the patient's satisfaction. Strict return precautions given. She and/or family conveyed understanding and agreement with care plan. Vital signs stable for discharge. PLAN  1. Return precautions as discussed with patient and available family/caregiver. 2.   Discharge Medication List as of 1/13/2023  8:16 PM        START taking these medications    Details   amLODIPine (Norvasc) 10 mg tablet Take 1 Tablet by mouth daily for 20 days. , Normal, Disp-20 Tablet, R-0      butalbital-acetaminophen-caff (Fioricet) -40 mg per capsule Take 1 Capsule by mouth every four (4) hours as needed for Migraine or Headache.  Indications: tension headache, Normal, Disp-20 Capsule, R-0           CONTINUE these medications which have NOT CHANGED    Details   PNV EB.91/KATIINJ fum/folic ac (PRENATAL PO) Take  by mouth., Historical Med      nicotine (NICODERM CQ) 21 mg/24 hr 1 Patch by TransDERmal route every twenty-four (24) hours for 30 days. , Normal, Disp-90 Patch, R-0      labetaloL (NORMODYNE) 100 mg tablet Take 1 Tablet by mouth two (2) times a day., Normal, Disp-180 Tablet, R-2      metFORMIN ER (GLUCOPHAGE XR) 500 mg tablet Take 1 Tablet by mouth daily (with dinner) for 360 days. , Normal, Disp-90 Tablet, R-1      propylthiouraciL (PTU) 50 mg tablet Take 2 Tablets by mouth three (3) times daily. , Normal, Disp-180 Tablet, R-5           3. Follow-up Information       Follow up With Specialties Details Why Contact Info    [facility] EMERGENCY DEPT Emergency Medicine  As needed, If symptoms worsen [address] [place] [address]  [phone]    Caridad Sotelo, [address]   [address]  [phone]            Instructed to return to ED if worse    FINAL DIAGNOSIS   Clinical Impression:  1. Accelerated hypertension    2. Acute non intractable tension-type headache    3. Migraine without status migrainosus, not intractable, unspecified migraine type    4. Occipital headache      Attestation:  I am the attending of record for this patient. I personally performed the services described in this documentation on this date, 1/13/2023 for patient, Isaac Bhakta. I have reviewed the chart and verified that the record is accurate and complete.       Familia Lofton MD (Electronic Signature)

## 2023-01-14 NOTE — DISCHARGE INSTRUCTIONS
Thank You! It was a pleasure taking care of you in our Emergency Department today. We know that when you come to [facility], you are entrusting us with your health, comfort, and safety. Our physicians and nurses honor that trust, and truly appreciate the opportunity to care for you and your loved ones. We also value your feedback. If you receive a survey about your Emergency Department experience today, please fill it out. We care about our patients' feedback, and we listen to what you have to say. Thank you. Dr. Jorgito Solis M.D.      ________________________________________________________________________  I have included a copy of your lab results and/or radiologic studies from today's visit so you can have them easily available at your follow-up visit. We hope you feel better and please do not hesitate to contact the ED if you have any questions at all! Recent Results (from the past 12 hour(s))   CBC WITH AUTOMATED DIFF    Collection Time: 01/13/23  5:00 PM   Result Value Ref Range    WBC 7.3 3.6 - 11.0 K/uL    RBC 4.61 3.80 - 5.20 M/uL    HGB 12.2 11.5 - 16.0 g/dL    HCT 36.0 35.0 - 47.0 %    MCV 78.1 (L) 80.0 - 99.0 FL    MCH 26.5 26.0 - 34.0 PG    MCHC 33.9 30.0 - 36.5 g/dL    RDW 13.6 11.5 - 14.5 %    PLATELET 705 165 - 209 K/uL    MPV 9.9 8.9 - 12.9 FL    NRBC 0.0 0  WBC    ABSOLUTE NRBC 0.00 0.00 - 0.01 K/uL    NEUTROPHILS 48 32 - 75 %    LYMPHOCYTES 42 12 - 49 %    MONOCYTES 7 5 - 13 %    EOSINOPHILS 2 0 - 7 %    BASOPHILS 1 0 - 1 %    IMMATURE GRANULOCYTES 0 0.0 - 0.5 %    ABS. NEUTROPHILS 3.6 1.8 - 8.0 K/UL    ABS. LYMPHOCYTES 3.0 0.8 - 3.5 K/UL    ABS. MONOCYTES 0.5 0.0 - 1.0 K/UL    ABS. EOSINOPHILS 0.1 0.0 - 0.4 K/UL    ABS. BASOPHILS 0.0 0.0 - 0.1 K/UL    ABS. IMM.  GRANS. 0.0 0.00 - 0.04 K/UL    DF AUTOMATED     METABOLIC PANEL, COMPREHENSIVE    Collection Time: 01/13/23  5:00 PM   Result Value Ref Range    Sodium 137 136 - 145 mmol/L Potassium 4.2 3.5 - 5.1 mmol/L    Chloride 106 97 - 108 mmol/L    CO2 28 21 - 32 mmol/L    Anion gap 3 (L) 5 - 15 mmol/L    Glucose 98 65 - 100 mg/dL    BUN 5 (L) 6 - 20 MG/DL    Creatinine 0.69 0.55 - 1.02 MG/DL    BUN/Creatinine ratio 7 (L) 12 - 20      eGFR >60 >60 ml/min/1.73m2    Calcium 8.9 8.5 - 10.1 MG/DL    Bilirubin, total 0.5 0.2 - 1.0 MG/DL    ALT (SGPT) 17 12 - 78 U/L    AST (SGOT) 27 15 - 37 U/L    Alk. phosphatase 88 45 - 117 U/L    Protein, total 7.4 6.4 - 8.2 g/dL    Albumin 3.4 (L) 3.5 - 5.0 g/dL    Globulin 4.0 2.0 - 4.0 g/dL    A-G Ratio 0.9 (L) 1.1 - 2.2         No orders to display     CT Results  (Last 48 hours)      None          The exam and treatment you received in the Emergency Department were for an urgent problem and are not intended as complete care. It is important that you follow up with a doctor, nurse practitioner, or physician assistant for ongoing care. If your symptoms become worse or you do not improve as expected and you are unable to reach your usual health care provider, you should return to the Emergency Department. We are available 24 hours a day. Please take your discharge instructions with you when you go to your follow-up appointment. If a prescription has been provided, please have it filled as soon as possible to prevent a delay in treatment. Read the entire medication instruction sheet provided to you by the pharmacy. If you have any questions or reservations about taking the medication due to side effects or interactions with other medications, please call your primary care physician or contact the ER to speak with the charge nurse. Please make an appointment with your family doctor or the physician you were referred to for follow-up of this visit as instructed on your discharge paperwork. Return to the ER if you are unable to be seen or if you are unable to be seen in a timely manner.     Should you experience abdominal pain lasting greater than 6 hours, chest pain, difficulty breathing, fever/chills, numbness/tingling, skin changes or other symptoms that concern you, return to the ED sooner. If you feel worse over the next 24 hours, please return to the ED. We are available 24 hours a day. Thank you for trusting us with your care!

## 2023-02-07 ENCOUNTER — VIRTUAL VISIT (OUTPATIENT)
Dept: FAMILY MEDICINE CLINIC | Age: 38
End: 2023-02-07
Payer: MEDICAID

## 2023-02-07 DIAGNOSIS — I10 ESSENTIAL HYPERTENSION: ICD-10-CM

## 2023-02-07 RX ORDER — LISINOPRIL AND HYDROCHLOROTHIAZIDE 12.5; 2 MG/1; MG/1
1 TABLET ORAL DAILY
Qty: 90 TABLET | Refills: 3
Start: 2023-02-07

## 2023-02-07 NOTE — PROGRESS NOTES
Name and  Verified. Pharmacy verified     Chief Complaint   Patient presents with    ED Follow-up     2023  [facility]  Hypertension     1. Have you been to the ER, urgent care clinic since your last visit? Hospitalized since your last visit? Yes  2023  [facility]  Hypertension    2. Have you seen or consulted any other health care providers outside of the [address] [place] [address] since your last visit? Include any pap smears or colon screening.  No    Health Maintenance Due   Topic Date Due    Hepatitis C Screening  Never done    Depression Screen  Never done    Pneumococcal 0-64 years (1 - PCV) Never done    Cervical cancer screen  Never done    COVID-19 Vaccine (3 - Booster) 2021    Flu Vaccine (1) Never done

## 2023-04-03 ENCOUNTER — OFFICE VISIT (OUTPATIENT)
Dept: FAMILY MEDICINE CLINIC | Age: 38
End: 2023-04-03
Payer: COMMERCIAL

## 2023-04-03 DIAGNOSIS — D17.0 LIPOMA OF NECK: ICD-10-CM

## 2023-04-03 DIAGNOSIS — F51.01 PRIMARY INSOMNIA: ICD-10-CM

## 2023-04-03 DIAGNOSIS — I10 HTN (HYPERTENSION), BENIGN: Primary | ICD-10-CM

## 2023-04-03 PROCEDURE — 3079F DIAST BP 80-89 MM HG: CPT | Performed by: STUDENT IN AN ORGANIZED HEALTH CARE EDUCATION/TRAINING PROGRAM

## 2023-04-03 PROCEDURE — 99213 OFFICE O/P EST LOW 20 MIN: CPT | Performed by: STUDENT IN AN ORGANIZED HEALTH CARE EDUCATION/TRAINING PROGRAM

## 2023-04-03 PROCEDURE — 3074F SYST BP LT 130 MM HG: CPT | Performed by: STUDENT IN AN ORGANIZED HEALTH CARE EDUCATION/TRAINING PROGRAM

## 2023-04-03 RX ORDER — 1.1% SODIUM FLUORIDE PRESCRIPTION DENTAL CREAM 5 MG/G
CREAM DENTAL SEE ADMIN INSTRUCTIONS
COMMUNITY
Start: 2023-02-28

## 2023-04-03 NOTE — PROGRESS NOTES
Chief Complaint   Patient presents with    Hypertension     Follow up       1. \"Have you been to the ER, urgent care clinic since your last visit? Hospitalized since your last visit? \" Yes When: 12/22 ED [facility] ER for knot on neck    2. \"Have you seen or consulted any other health care providers outside of the [address] [place] [address] since your last visit? \" No     3. For patients aged 39-70: Has the patient had a colonoscopy / FIT/ Cologuard? NA - based on age      If the patient is female:    4. For patients aged 41-77: Has the patient had a mammogram within the past 2 years? NA - based on age or sex      11. For patients aged 21-65: Has the patient had a pap smear?  Yes - no Care Gap present Dr. Méndez Barry [facility]    Visit Vitals  /84 (BP 1 Location: Left upper arm, BP Patient Position: Sitting)   Pulse 87   Temp 98.7 °F (37.1 °C) (Oral)   Resp 16   Ht 5' 6\" (1.676 m)   Wt 227 lb 12.8 oz (103.3 kg)   LMP 03/03/2023 (Approximate)   SpO2 97%   BMI 36.77 kg/m²

## 2023-04-03 NOTE — PATIENT INSTRUCTIONS
[facility] [address], [place], [address]    [phone]    [facility]:    Dr. Mirlande Agarwal (She is accepting new patients and scheduling like a month out)  [address] [facility], [address] [facility]    [address] [facility]    [address] [place] [address], [address]  [place], [address] [facility]  [phone]      Cintia Siu MD ([facility])                   [address]  [facility], [address] [place] [address]  [phone]    [facility] DERMATOLOGY    [address]    [address]    [address] [facility]    [phone]    [facility]    [phone]

## 2023-04-03 NOTE — PROGRESS NOTES
[address]  [address] [facility], [address]  [phone]    Chief Complaint: HTN, insomnia and lump on neck    Subjective  Erasmo Naqvi is a 30 y.o. BLACK/ female , established patient, here for evaluation of the concern(s) above;    HTN:  Now well compliant since on Prinzide. Well tolerated and no side effects. Has not been taking Labetalol as recommended by Endocrinology (Dr. Alavrado Mtahis) whom she follows for hyperthyroidism. Insomnia:  Has had issues with sleeping. Takes Melatonin which helps. Lump on neck:  Small lump on the posterior neck. States that it sometimes tingles when irritated. Has been present for a while. Allergies - reviewed: Allergies   Allergen Reactions    Nuts [Tree Nut] Anaphylaxis and Shortness of Breath       Past Medical History - reviewed:  Past Medical History:   Diagnosis Date    Goiter     Headache     Hypertension        Depression screening:  3 most recent PHQ Screens 4/3/2023   Little interest or pleasure in doing things Not at all   Feeling down, depressed, irritable, or hopeless Not at all   Total Score PHQ 2 0         Review of systems:    A comprehensive review of systems was negative except for that written in the History of Present Illness. Physical Exam  Visit Vitals  /84 (BP 1 Location: Left upper arm, BP Patient Position: Sitting)   Pulse 87   Temp 98.7 °F (37.1 °C) (Oral)   Resp 16   Ht 5' 6\" (1.676 m)   Wt 227 lb 12.8 oz (103.3 kg)   LMP 03/03/2023 (Approximate)   SpO2 97%   BMI 36.77 kg/m²       General: Alert and oriented, in no acute distress. Well nourished. SKIN: No rash. Small rubbery mass on the posterior neck. EYE: PERRL. Sclera and conjuctival clear. Extraocular movements intact. EARS: External normal, canals clear, tympanic membranes normal.   NOSE: Mucosa healthy without drainage or ulceration.   OROPHARYNX: No suspicious lesions, normal dentition, pharynx, tongue and tonsils normal.  NECK: Supple; no masses; thyroid normal.  LUNGS: Respirations unlabored; clear to auscultation bilaterally. CARDIOVASCULAR: Regular, rate, and rhythm without murmurs, gallops or rubs. ABDOMEN: Soft; nontender; nondistended; normoactive bowel sounds; no masses or organomegaly. MUSCULOSKELETAL: FROM in all extremities     EXT: No edema. Neurovascularlly intact. Normal gait. Neurological: Alert and oriented X 3, normal strength and tone. Normal symmetric reflexes. Normal coordination and gait       Assessment/Plan    ICD-10-CM ICD-9-CM    1. HTN (hypertension), benign  I10 401.1       2. Primary insomnia  F51.01 307.42       3. Lipoma of neck  D17.0 214.1 REFERRAL TO DERMATOLOGY        1. HTN (hypertension), benign  Continue prinzide. BP at goal    2. Primary insomnia  Continue melatonin for sleep    3. Lipoma of neck  Recommend observing for now. Pt would like to see derm to consider removal.  - REFERRAL TO DERMATOLOGY        Follow-up and Dispositions    Return in about 3 months (around 7/3/2023), or if symptoms worsen or fail to improve. We discussed the expected course, resolution and complications of the diagnosis(es) in detail. Medication risks, benefits, costs, interactions, and alternatives were discussed as indicated. I advised him to contact the office if his condition worsens, changes or fails to improve as anticipated. He expressed understanding with the diagnosis(es) and plan. Patient understands that this encounter was a temporary measure, and the importance of further follow up and examination was emphasized. Patient verbalized understanding.       Signed By: Art Mora MD     April 3, 2023

## 2023-05-23 RX ORDER — LISINOPRIL AND HYDROCHLOROTHIAZIDE 20; 12.5 MG/1; MG/1
TABLET ORAL
Qty: 90 TABLET | Refills: 0 | Status: SHIPPED | OUTPATIENT
Start: 2023-05-23

## 2023-06-30 SDOH — ECONOMIC STABILITY: FOOD INSECURITY: WITHIN THE PAST 12 MONTHS, YOU WORRIED THAT YOUR FOOD WOULD RUN OUT BEFORE YOU GOT MONEY TO BUY MORE.: NEVER TRUE

## 2023-06-30 SDOH — ECONOMIC STABILITY: INCOME INSECURITY: HOW HARD IS IT FOR YOU TO PAY FOR THE VERY BASICS LIKE FOOD, HOUSING, MEDICAL CARE, AND HEATING?: NOT VERY HARD

## 2023-06-30 SDOH — ECONOMIC STABILITY: TRANSPORTATION INSECURITY
IN THE PAST 12 MONTHS, HAS LACK OF TRANSPORTATION KEPT YOU FROM MEETINGS, WORK, OR FROM GETTING THINGS NEEDED FOR DAILY LIVING?: NO

## 2023-06-30 SDOH — ECONOMIC STABILITY: FOOD INSECURITY: WITHIN THE PAST 12 MONTHS, THE FOOD YOU BOUGHT JUST DIDN'T LAST AND YOU DIDN'T HAVE MONEY TO GET MORE.: NEVER TRUE

## 2023-06-30 SDOH — ECONOMIC STABILITY: HOUSING INSECURITY
IN THE LAST 12 MONTHS, WAS THERE A TIME WHEN YOU DID NOT HAVE A STEADY PLACE TO SLEEP OR SLEPT IN A SHELTER (INCLUDING NOW)?: NO

## 2023-07-03 ENCOUNTER — OFFICE VISIT (OUTPATIENT)
Age: 38
End: 2023-07-03
Payer: MEDICAID

## 2023-07-03 VITALS
HEIGHT: 66 IN | RESPIRATION RATE: 17 BRPM | WEIGHT: 221 LBS | SYSTOLIC BLOOD PRESSURE: 109 MMHG | HEART RATE: 83 BPM | DIASTOLIC BLOOD PRESSURE: 78 MMHG | BODY MASS INDEX: 35.52 KG/M2 | OXYGEN SATURATION: 96 % | TEMPERATURE: 98.2 F

## 2023-07-03 DIAGNOSIS — I10 PRIMARY HYPERTENSION: Primary | ICD-10-CM

## 2023-07-03 PROCEDURE — 3078F DIAST BP <80 MM HG: CPT | Performed by: STUDENT IN AN ORGANIZED HEALTH CARE EDUCATION/TRAINING PROGRAM

## 2023-07-03 PROCEDURE — 99213 OFFICE O/P EST LOW 20 MIN: CPT | Performed by: STUDENT IN AN ORGANIZED HEALTH CARE EDUCATION/TRAINING PROGRAM

## 2023-07-03 PROCEDURE — 3074F SYST BP LT 130 MM HG: CPT | Performed by: STUDENT IN AN ORGANIZED HEALTH CARE EDUCATION/TRAINING PROGRAM

## 2023-07-03 RX ORDER — LISINOPRIL AND HYDROCHLOROTHIAZIDE 20; 12.5 MG/1; MG/1
1 TABLET ORAL DAILY
Qty: 90 TABLET | Refills: 1
Start: 2023-07-03

## 2023-07-03 NOTE — PROGRESS NOTES
Name and Date of Birth Verified    Pharmacy Verified    Chief Complaint   Patient presents with    Follow-up     3 Months 4/3/2023 Hypertension     Patient fasting for labs, Lab Fredo.    1. \"Have you been to the ER, urgent care clinic since your last visit? Hospitalized since your last visit? \" No    2. \"Have you seen or consulted any other health care providers outside of the [address] [place] [address] since your last visit? \" No     3. For patients aged 43-73: Has the patient had a colonoscopy / FIT/ Cologuard? N/A      If the patient is female:    4. For patients aged 43-66: Has the patient had a mammogram within the past 2 years? No      5. For patients aged 21-65: Has the patient had a pap smear?      Yes 6/6/2023     Health Maintenance Due   Topic Date Due    COVID-19 Vaccine (1) Never done    Varicella vaccine (1 of 2 - 2-dose childhood series) Never done    Pneumococcal 0-64 years Vaccine (1 - PCV) Never done    HIV screen  Never done    Hepatitis C screen  Never done    DTaP/Tdap/Td vaccine (1 - Tdap) Never done    Cervical cancer screen  Never done

## 2023-07-24 RX ORDER — METFORMIN HYDROCHLORIDE 500 MG/1
TABLET, EXTENDED RELEASE ORAL
Qty: 90 TABLET | Refills: 3 | Status: SHIPPED | OUTPATIENT
Start: 2023-07-24

## 2023-07-26 DIAGNOSIS — E05.00 THYROTOXICOSIS WITH DIFFUSE GOITER WITHOUT THYROTOXIC CRISIS OR STORM: Primary | ICD-10-CM

## 2023-07-29 LAB
ALBUMIN SERPL-MCNC: 4.3 G/DL (ref 3.9–4.9)
ALP SERPL-CCNC: 84 IU/L (ref 44–121)
ALT SERPL-CCNC: 8 IU/L (ref 0–32)
AST SERPL-CCNC: 15 IU/L (ref 0–40)
BILIRUB DIRECT SERPL-MCNC: 0.11 MG/DL (ref 0–0.4)
BILIRUB SERPL-MCNC: 0.3 MG/DL (ref 0–1.2)
PROT SERPL-MCNC: 7.3 G/DL (ref 6–8.5)
T3 SERPL-MCNC: 159 NG/DL (ref 71–180)
T4 FREE SERPL-MCNC: 1.21 NG/DL (ref 0.82–1.77)
TSH SERPL DL<=0.005 MIU/L-ACNC: <0.005 UIU/ML (ref 0.45–4.5)
WBC # BLD AUTO: 6.9 X10E3/UL (ref 3.4–10.8)

## 2023-07-30 LAB — TSI SER-ACNC: 4.14 IU/L (ref 0–0.55)

## 2023-08-02 ENCOUNTER — OFFICE VISIT (OUTPATIENT)
Age: 38
End: 2023-08-02
Payer: MEDICAID

## 2023-08-02 VITALS
DIASTOLIC BLOOD PRESSURE: 77 MMHG | HEIGHT: 66 IN | OXYGEN SATURATION: 99 % | RESPIRATION RATE: 16 BRPM | SYSTOLIC BLOOD PRESSURE: 118 MMHG | WEIGHT: 224 LBS | BODY MASS INDEX: 36 KG/M2 | HEART RATE: 85 BPM

## 2023-08-02 DIAGNOSIS — E05.00 THYROTOXICOSIS WITH DIFFUSE GOITER WITHOUT THYROTOXIC CRISIS OR STORM: Primary | ICD-10-CM

## 2023-08-02 DIAGNOSIS — I10 ESSENTIAL (PRIMARY) HYPERTENSION: ICD-10-CM

## 2023-08-02 PROCEDURE — 3074F SYST BP LT 130 MM HG: CPT | Performed by: INTERNAL MEDICINE

## 2023-08-02 PROCEDURE — 99214 OFFICE O/P EST MOD 30 MIN: CPT | Performed by: INTERNAL MEDICINE

## 2023-08-02 PROCEDURE — 3078F DIAST BP <80 MM HG: CPT | Performed by: INTERNAL MEDICINE

## 2023-08-02 RX ORDER — PROPYLTHIOURACIL 50 MG/1
100 TABLET ORAL 3 TIMES DAILY
Qty: 90 TABLET | Refills: 2 | Status: SHIPPED | OUTPATIENT
Start: 2023-08-02

## 2023-08-02 NOTE — PROGRESS NOTES
Chief Complaint   Patient presents with    Follow-up    Thyroid Problem        History of Present Illness: Juventino Ortiz is a  30 y.o. female with a past medical hx significant for HTN, headaches presenting in referral  from Cassandra Crockett MD for a low TSH. Went to have a physical in December who noticed she had a large thyroid. Reports that she's never noticed thyroid enlargement until December. Works as schoolbus . Notes tremor, has had that for a few months. Thought she was just nervous. Has gained  40 lbs- since September- before this started was 145, now 185 - is 6\"6. Just started taking biotin 1 week ago- is taking 1 2500mcg of biotin. Was taking B and C complex vitamins- b12. No iodine. No CT scans with contrast. Does endorse some pain when turns  to the left, numbness in the R cheek. Chokes on liquids. Is interested in pursuing fertility. Going back to school on Feb 22nd- end of March. Experienced an ectopic pregnancy 12 years ago and has been trying to conceive for the past 5 years without success. Has seen fertility specialists regarding this. Menstrual periods are very irregular. Is actively pursuing fertility at this time. 03/17/2021: Has been taking 4 a day- has been doing that since I sent in rx. Doesn't hurt when she moves her hand, still has a little shake. It is not as bad. Still forgets everything. PCP recently started lisinopril/hctz- was not told number. 153/101 in the office- does not check at home. 04/22/2021: Switched over to PTU 04/14, repeat labs are due May 3rd. Boyfriend has semena nalysis pending next week.       06/28/2021:Hasn't been taking med 3x daily- got back on track last week. Forgot to email me back. Went back to repro when she was on a cycle- waiting of boyfriend to do the semen analysis, labs.       07/27/2022: Still interested in pursuing fertility, previous relationship did end. Missing the evening dose of PTU frequently.   Not

## 2023-08-28 DIAGNOSIS — I10 PRIMARY HYPERTENSION: ICD-10-CM

## 2023-08-29 RX ORDER — LISINOPRIL AND HYDROCHLOROTHIAZIDE 20; 12.5 MG/1; MG/1
1 TABLET ORAL DAILY
Qty: 90 TABLET | Refills: 1 | Status: SHIPPED | OUTPATIENT
Start: 2023-08-29

## 2023-08-29 NOTE — TELEPHONE ENCOUNTER
Last appointment: 7/3/23  Next appointment: 1/3/24  Previous refill encounter(s): 5/23/23 #90    Requested Prescriptions     Pending Prescriptions Disp Refills    lisinopril-hydroCHLOROthiazide (PRINZIDE;ZESTORETIC) 20-12.5 MG per tablet 90 tablet 1     Sig: Take 1 tablet by mouth daily         For Pharmacy Admin Tracking Only    Program: Medication Refill  CPA in place:    Recommendation Provided To:    Intervention Detail: New Rx: 1, reason: Patient Preference  Intervention Accepted By:   Oswaldo Leonard Closed?:    Time Spent (min): 5

## 2023-09-27 ENCOUNTER — TELEPHONE (OUTPATIENT)
Age: 38
End: 2023-09-27

## 2023-09-27 NOTE — TELEPHONE ENCOUNTER
9/27/2023  11:01 AM      Patient called and would like her last labs and notes sent to [address] [place] [address] Endocrinology because she had to find a new doctor due to her insurance.     [facility]#[phone]    #[phone]      Thanks,  Dhaval Rachel

## 2023-10-09 RX ORDER — PROPYLTHIOURACIL 50 MG/1
TABLET ORAL
Qty: 540 TABLET | Refills: 0 | Status: SHIPPED | OUTPATIENT
Start: 2023-10-09 | End: 2024-01-07

## 2024-02-01 RX ORDER — PROPYLTHIOURACIL 50 MG/1
TABLET ORAL
Qty: 540 TABLET | Refills: 0 | Status: SHIPPED | OUTPATIENT
Start: 2024-02-01 | End: 2024-05-01

## 2024-03-28 ENCOUNTER — HOSPITAL ENCOUNTER (EMERGENCY)
Facility: HOSPITAL | Age: 39
Discharge: HOME | End: 2024-03-28
Attending: EMERGENCY MEDICINE
Payer: COMMERCIAL

## 2024-03-28 VITALS
TEMPERATURE: 98.2 F | HEART RATE: 69 BPM | RESPIRATION RATE: 18 BRPM | HEIGHT: 66 IN | BODY MASS INDEX: 35.29 KG/M2 | DIASTOLIC BLOOD PRESSURE: 111 MMHG | WEIGHT: 219.58 LBS | SYSTOLIC BLOOD PRESSURE: 183 MMHG | OXYGEN SATURATION: 98 %

## 2024-03-28 DIAGNOSIS — I10 PRIMARY HYPERTENSION: ICD-10-CM

## 2024-03-28 DIAGNOSIS — L73.2 HIDRADENITIS SUPPURATIVA: ICD-10-CM

## 2024-03-28 DIAGNOSIS — L02.91 ABSCESS: Primary | ICD-10-CM

## 2024-03-28 PROCEDURE — 99283 EMERGENCY DEPT VISIT LOW MDM: CPT

## 2024-03-28 PROCEDURE — 2500000003 HC RX 250 WO HCPCS: Performed by: EMERGENCY MEDICINE

## 2024-03-28 PROCEDURE — 10060 I&D ABSCESS SIMPLE/SINGLE: CPT

## 2024-03-28 RX ORDER — LISINOPRIL AND HYDROCHLOROTHIAZIDE 20; 12.5 MG/1; MG/1
1 TABLET ORAL DAILY
Qty: 90 TABLET | Refills: 0 | OUTPATIENT
Start: 2024-03-28

## 2024-03-28 RX ORDER — LIDOCAINE HYDROCHLORIDE 10 MG/ML
5 INJECTION, SOLUTION EPIDURAL; INFILTRATION; INTRACAUDAL; PERINEURAL
Status: COMPLETED | OUTPATIENT
Start: 2024-03-28 | End: 2024-03-28

## 2024-03-28 RX ORDER — LISINOPRIL AND HYDROCHLOROTHIAZIDE 20; 12.5 MG/1; MG/1
1 TABLET ORAL DAILY
Qty: 90 TABLET | Refills: 1 | Status: SHIPPED | OUTPATIENT
Start: 2024-03-28

## 2024-03-28 RX ORDER — CEPHALEXIN 500 MG/1
500 CAPSULE ORAL 2 TIMES DAILY
Qty: 14 CAPSULE | Refills: 0 | Status: SHIPPED | OUTPATIENT
Start: 2024-03-28 | End: 2024-04-04

## 2024-03-28 RX ADMIN — LIDOCAINE HYDROCHLORIDE 5 ML: 10 INJECTION, SOLUTION EPIDURAL; INFILTRATION; INTRACAUDAL; PERINEURAL at 12:18

## 2024-03-28 ASSESSMENT — PAIN - FUNCTIONAL ASSESSMENT: PAIN_FUNCTIONAL_ASSESSMENT: NONE - DENIES PAIN

## 2024-03-28 ASSESSMENT — PAIN SCALES - GENERAL: PAINLEVEL_OUTOF10: 10

## 2024-03-28 ASSESSMENT — ENCOUNTER SYMPTOMS
RESPIRATORY NEGATIVE: 1
EYES NEGATIVE: 1
GASTROINTESTINAL NEGATIVE: 1

## 2024-03-28 NOTE — DISCHARGE INSTRUCTIONS
You were seen in the emergency department for an abscess under your right arm. Although an exact cause of your symptoms was not identified, the most likely cause is hidradenitis suppurativa.  Please take any medications prescribed at this visit as instructed.  Please follow-up with your PCP and a dermatologist or return to the emergency department if you experience a worsening of symptoms or any new symptoms that are concerning to you.

## 2024-03-28 NOTE — ED NOTES
Patient has a Dx of Hypertension and takes 12.2 mg Lisinopril/HCTZ however does not have a refill.  Patient stats they do not take everyday when they do have the meds cause they \"don't like the peeing all day\"  Patient educated on the effects of untreated hypertension.  Patient verbalized understand.  Patient also stated they have an apt with their PCP at the end of April.  Dr. Coleman sent a script for Lisinopril for 30 days to [facility].     Patient left with discharged papers in hand.

## 2024-03-28 NOTE — ED PROVIDER NOTES
[facility] EMERGENCY DEP  EMERGENCY DEPARTMENT ENCOUNTER      Pt Name: Barbie Dover  MRN: [id number]  Birthdate 1985  Date of evaluation: 3/28/2024  Provider: Tim Coleman MD    CHIEF COMPLAINT       Chief Complaint   Patient presents with    Abscess         HISTORY OF PRESENT ILLNESS   (Location/Symptom, Timing/Onset, Context/Setting, Quality, Duration, Modifying Factors, Severity)  Note limiting factors.   38-year-old female with PMHx of hypertension and recurrent abscesses under bilateral axilla arrives ambulatory with c/o progressively worsening, painful boil under right arm for 2 weeks.  She has no additional complaints this time.    The history is provided by the patient.         Review of External Medical Records:     Nursing Notes were reviewed.    REVIEW OF SYSTEMS    (2-9 systems for level 4, 10 or more for level 5)     Review of Systems   Constitutional: Negative.    HENT: Negative.     Eyes: Negative.    Respiratory: Negative.     Cardiovascular: Negative.    Gastrointestinal: Negative.    Genitourinary: Negative.    Musculoskeletal: Negative.    Skin: Negative.         Painful fluctuant mass right axilla   Neurological: Negative.    Psychiatric/Behavioral: Negative.         Except as noted above the remainder of the review of systems was reviewed and negative.       PAST MEDICAL HISTORY     Past Medical History:   Diagnosis Date    Goiter     Headache     Hypertension          SURGICAL HISTORY       Past Surgical History:   Procedure Laterality Date    GYN      ectopic pregnancy - lapartomy         CURRENT MEDICATIONS       Previous Medications    LISINOPRIL-HYDROCHLOROTHIAZIDE (PRINZIDE;ZESTORETIC) 20-12.5 MG PER TABLET    Take 1 tablet by mouth daily    METFORMIN (GLUCOPHAGE-XR) 500 MG EXTENDED RELEASE TABLET    TAKE 1 TABLET BY MOUTH ONCE DAILY (WITH DINNER)  DAYS    PROPYLTHIOURACIL (PTU) 50 MG TABLET    TAKE 2 TABLETS BY MOUTH THREE TIMES DAILY       ALLERGIES     Macadamia nut      START taking these medications    Details   cephALEXin (KEFLEX) 500 MG capsule Take 1 capsule by mouth 2 times daily for 7 days, Disp-14 capsule, R-0Normal               (Please note that portions of this note were completed with a voice recognition program.  Efforts were made to edit the dictations but occasionally words are mis-transcribed.)    Tim Coleman MD (electronically signed)  Emergency Attending Physician / Physician Assistant / Nurse Practitioner            Tim Coleman MD  04/03/24 1475

## 2024-04-24 ENCOUNTER — OFFICE VISIT (OUTPATIENT)
Age: 39
End: 2024-04-24
Payer: COMMERCIAL

## 2024-04-24 VITALS
TEMPERATURE: 97.8 F | RESPIRATION RATE: 17 BRPM | SYSTOLIC BLOOD PRESSURE: 118 MMHG | DIASTOLIC BLOOD PRESSURE: 86 MMHG | BODY MASS INDEX: 33.23 KG/M2 | WEIGHT: 206.79 LBS | OXYGEN SATURATION: 98 % | HEART RATE: 88 BPM | HEIGHT: 66 IN

## 2024-04-24 DIAGNOSIS — I10 PRIMARY HYPERTENSION: ICD-10-CM

## 2024-04-24 DIAGNOSIS — Z00.00 ANNUAL PHYSICAL EXAM: Primary | ICD-10-CM

## 2024-04-24 DIAGNOSIS — Z91.010 PEANUT ALLERGY: ICD-10-CM

## 2024-04-24 PROCEDURE — 3074F SYST BP LT 130 MM HG: CPT | Performed by: STUDENT IN AN ORGANIZED HEALTH CARE EDUCATION/TRAINING PROGRAM

## 2024-04-24 PROCEDURE — 99395 PREV VISIT EST AGE 18-39: CPT | Performed by: STUDENT IN AN ORGANIZED HEALTH CARE EDUCATION/TRAINING PROGRAM

## 2024-04-24 PROCEDURE — 3079F DIAST BP 80-89 MM HG: CPT | Performed by: STUDENT IN AN ORGANIZED HEALTH CARE EDUCATION/TRAINING PROGRAM

## 2024-04-24 RX ORDER — EPINEPHRINE 0.3 MG/.3ML
INJECTION SUBCUTANEOUS
Qty: 2 EACH | Refills: 0 | Status: SHIPPED | OUTPATIENT
Start: 2024-04-24

## 2024-04-24 RX ORDER — LISINOPRIL AND HYDROCHLOROTHIAZIDE 20; 12.5 MG/1; MG/1
1 TABLET ORAL DAILY
Qty: 90 TABLET | Refills: 3 | Status: SHIPPED | OUTPATIENT
Start: 2024-04-24

## 2024-04-24 ASSESSMENT — PATIENT HEALTH QUESTIONNAIRE - PHQ9
2. FEELING DOWN, DEPRESSED OR HOPELESS: NOT AT ALL
SUM OF ALL RESPONSES TO PHQ QUESTIONS 1-9: 0
SUM OF ALL RESPONSES TO PHQ9 QUESTIONS 1 & 2: 0
1. LITTLE INTEREST OR PLEASURE IN DOING THINGS: NOT AT ALL
SUM OF ALL RESPONSES TO PHQ QUESTIONS 1-9: 0

## 2024-04-24 NOTE — PROGRESS NOTES
Chief Complaint   Patient presents with    Annual Exam     Patiet requesting a EPI Pen.    \"Have you been to the ER, urgent care clinic since your last visit?  Hospitalized since your last visit?\"      Yes  3/28/2024  [facility]  Abscess    “Have you seen or consulted any other health care providers outside of [facility] since your last visit?”    NO        “Have you had a pap smear?”      Yes 2023 Dr[facility]      Vitals:    24 0830   BP: 118/86   Pulse: 88   Resp: 17   Temp: 97.8 °F (36.6 °C)   SpO2: 98%     Health Maintenance Due   Topic Date Due    Hepatitis B vaccine (1 of 3 - 3-dose series) Never done    Varicella vaccine (1 of 2 - 2-dose childhood series) Never done    Pneumococcal 0-64 years Vaccine (1 of 2 - PCV) Never done    HIV screen  Never done    Hepatitis C screen  Never done    DTaP/Tdap/Td vaccine (1 - Tdap) Never done    Cervical cancer screen  Never done      The patient, Barbie Dover, identity was verified by name and , pharmacy verified  Labs:Yes  Fasting:Yes         
recommendations  - Reviewed diet, exercise and weight control  -Immunizations appropriate for age were discussed with patient and updated   - Recommended sodium restriction  - Reviewed medications and side effects in detail    2. Primary hypertension:  At goal  - lisinopril-hydroCHLOROthiazide (PRINZIDE;ZESTORETIC) 20-12.5 MG per tablet; Take 1 tablet by mouth daily  Dispense: 90 tablet; Refill: 3    3. Peanut allergy  - EPINEPHrine (EPIPEN 2-ESTEE) 0.3 MG/0.3ML SOAJ injection; Inject 0.3 mg intramuscularly as needed for Severe allergic reaction  Dispense: 2 each; Refill: 0      Follow up: 1 year. RTC to clinic sooner should symptoms persist, worsen or fail to improve as anticipated.    We discussed the expected course, resolution and complications of the diagnosis(es) in detail.  Medication risks, benefits, costs, interactions, and alternatives were discussed as indicated.  I advised to contact the office if his condition worsens, changes or fails to improve as anticipated. Pt expressed understanding with the diagnosis(es) and plan. Patient understands that this encounter was a temporary measure, and the importance of further follow up and examination was emphasized.  Patient verbalized understanding.      Signed By: Dav Serrano MD     April 24, 2024

## 2024-04-30 ENCOUNTER — OFFICE VISIT (OUTPATIENT)
Age: 39
End: 2024-04-30
Payer: COMMERCIAL

## 2024-04-30 VITALS
BODY MASS INDEX: 32.9 KG/M2 | HEIGHT: 66 IN | RESPIRATION RATE: 16 BRPM | WEIGHT: 204.7 LBS | DIASTOLIC BLOOD PRESSURE: 78 MMHG | HEART RATE: 91 BPM | SYSTOLIC BLOOD PRESSURE: 117 MMHG | OXYGEN SATURATION: 99 %

## 2024-04-30 DIAGNOSIS — E05.00 THYROTOXICOSIS WITH DIFFUSE GOITER WITHOUT THYROTOXIC CRISIS OR STORM: Primary | ICD-10-CM

## 2024-04-30 LAB
ALBUMIN SERPL-MCNC: 4.8 G/DL (ref 3.9–4.9)
ALP SERPL-CCNC: 75 IU/L (ref 44–121)
ALT SERPL-CCNC: 7 IU/L (ref 0–32)
AST SERPL-CCNC: 11 IU/L (ref 0–40)
BILIRUB DIRECT SERPL-MCNC: 0.12 MG/DL (ref 0–0.4)
BILIRUB SERPL-MCNC: 0.4 MG/DL (ref 0–1.2)
PROT SERPL-MCNC: 7.4 G/DL (ref 6–8.5)
T3 SERPL-MCNC: 98 NG/DL (ref 71–180)
T4 FREE SERPL-MCNC: 0.94 NG/DL (ref 0.82–1.77)
TSH SERPL DL<=0.005 MIU/L-ACNC: 0.29 UIU/ML (ref 0.45–4.5)
WBC # BLD AUTO: 7.6 X10E3/UL (ref 3.4–10.8)

## 2024-04-30 PROCEDURE — 3078F DIAST BP <80 MM HG: CPT | Performed by: INTERNAL MEDICINE

## 2024-04-30 PROCEDURE — 3074F SYST BP LT 130 MM HG: CPT | Performed by: INTERNAL MEDICINE

## 2024-04-30 PROCEDURE — 99214 OFFICE O/P EST MOD 30 MIN: CPT | Performed by: INTERNAL MEDICINE

## 2024-04-30 RX ORDER — METHIMAZOLE 10 MG/1
20 TABLET ORAL DAILY
Qty: 180 TABLET | Refills: 1 | Status: SHIPPED | OUTPATIENT
Start: 2024-04-30

## 2024-04-30 RX ORDER — NICOTINE 21 MG/24HR
1 PATCH, TRANSDERMAL 24 HOURS TRANSDERMAL DAILY
Qty: 42 PATCH | Refills: 11 | Status: SHIPPED | OUTPATIENT
Start: 2024-04-30 | End: 2024-06-11

## 2024-04-30 RX ORDER — METFORMIN HYDROCHLORIDE 500 MG/1
TABLET, EXTENDED RELEASE ORAL
Qty: 90 TABLET | Refills: 3 | Status: SHIPPED | OUTPATIENT
Start: 2024-04-30

## 2024-04-30 NOTE — PROGRESS NOTES
Chief Complaint   Patient presents with    Thyroid Problem    Medication Refill        History of Present Illness: Barbie Dover is a  38 y.o.female with a past medical hx significant for HTN, headaches presenting in referral  from Dav Serrano MD for a low TSH.       Went to have a physical in December who noticed she had a large thyroid. Reports that she's never noticed thyroid enlargement until December. Works as schoolbus . Notes tremor, has had that for a few months. Thought she was just nervous. Has gained  40 lbs- since September- before this started was 145, now 185 - is 6\"6. Just started taking biotin 1 week ago- is taking 1 2500mcg of biotin. Was taking B and C complex vitamins- b12. No iodine. No CT scans with contrast. Does endorse some pain when turns  to the left, numbness in the R cheek. Chokes on liquids.      Is interested in pursuing fertility. Going back to school on Feb 22nd- end of March.  Experienced an ectopic pregnancy 12 years ago and has been trying to conceive for the past 5 years without success.  Has seen fertility specialists regarding this.   Menstrual periods are very irregular.  Is actively pursuing fertility at this time.      03/17/2021: Has been taking 4 a day- has been doing that since I sent in rx. Doesn't hurt when she moves her hand, still has a little shake.  It is not as bad. Still forgets everything. PCP recently started lisinopril/hctz- was not told number. 153/101 in the office- does not check at home.      04/22/2021: Switched over to PTU 04/14, repeat labs are due May 3rd. Boyfriend has semena nalysis pending next week.       06/28/2021:Hasn't been taking med 3x daily- got back on track last week. Forgot to email me back. Went back to repro when she was on a cycle- waiting of boyfriend to do the semen analysis, labs.       07/27/2022: Still interested in pursuing fertility, previous relationship did end.  Missing the evening dose of PTU frequently.

## 2024-05-01 LAB — TSI SER-ACNC: 2.13 IU/L (ref 0–0.55)

## 2024-09-01 ENCOUNTER — HOSPITAL ENCOUNTER (EMERGENCY)
Facility: HOSPITAL | Age: 39
Discharge: HOME | End: 2024-09-01
Attending: STUDENT IN AN ORGANIZED HEALTH CARE EDUCATION/TRAINING PROGRAM
Payer: COMMERCIAL

## 2024-09-01 VITALS
HEIGHT: 63 IN | DIASTOLIC BLOOD PRESSURE: 82 MMHG | SYSTOLIC BLOOD PRESSURE: 124 MMHG | RESPIRATION RATE: 19 BRPM | OXYGEN SATURATION: 100 % | BODY MASS INDEX: 35.94 KG/M2 | WEIGHT: 202.82 LBS | HEART RATE: 86 BPM | TEMPERATURE: 98.2 F

## 2024-09-01 DIAGNOSIS — M54.50 ACUTE LEFT-SIDED LOW BACK PAIN WITHOUT SCIATICA: Primary | ICD-10-CM

## 2024-09-01 LAB
APPEARANCE UR: CLEAR
BACTERIA URNS QL MICRO: NEGATIVE /HPF
BILIRUB UR QL: NEGATIVE
COLOR UR: ABNORMAL
COMMENT:: NORMAL
EPITH CASTS URNS QL MICRO: ABNORMAL /LPF
GLUCOSE UR STRIP.AUTO-MCNC: NEGATIVE MG/DL
HCG UR QL: NEGATIVE
HGB UR QL STRIP: NEGATIVE
HYALINE CASTS URNS QL MICRO: ABNORMAL /LPF (ref 0–5)
KETONES UR QL STRIP.AUTO: ABNORMAL MG/DL
LEUKOCYTE ESTERASE UR QL STRIP.AUTO: NEGATIVE
NITRITE UR QL STRIP.AUTO: NEGATIVE
PH UR STRIP: 5.5 (ref 5–8)
PROT UR STRIP-MCNC: NEGATIVE MG/DL
RBC #/AREA URNS HPF: ABNORMAL /HPF (ref 0–5)
SP GR UR REFRACTOMETRY: 1.02 (ref 1–1.03)
SPECIMEN HOLD: NORMAL
UROBILINOGEN UR QL STRIP.AUTO: 0.2 EU/DL (ref 0.2–1)
WBC URNS QL MICRO: ABNORMAL /HPF (ref 0–4)

## 2024-09-01 PROCEDURE — 6360000002 HC RX W HCPCS: Performed by: STUDENT IN AN ORGANIZED HEALTH CARE EDUCATION/TRAINING PROGRAM

## 2024-09-01 PROCEDURE — 96372 THER/PROPH/DIAG INJ SC/IM: CPT

## 2024-09-01 PROCEDURE — 81001 URINALYSIS AUTO W/SCOPE: CPT

## 2024-09-01 PROCEDURE — 81025 URINE PREGNANCY TEST: CPT

## 2024-09-01 PROCEDURE — 99284 EMERGENCY DEPT VISIT MOD MDM: CPT

## 2024-09-01 RX ORDER — KETOROLAC TROMETHAMINE 30 MG/ML
30 INJECTION, SOLUTION INTRAMUSCULAR; INTRAVENOUS ONCE
Status: COMPLETED | OUTPATIENT
Start: 2024-09-01 | End: 2024-09-01

## 2024-09-01 RX ADMIN — KETOROLAC TROMETHAMINE 30 MG: 30 INJECTION, SOLUTION INTRAMUSCULAR at 01:44

## 2024-09-01 ASSESSMENT — PAIN DESCRIPTION - LOCATION: LOCATION: BACK

## 2024-09-01 ASSESSMENT — PAIN SCALES - GENERAL: PAINLEVEL_OUTOF10: 5

## 2024-09-01 ASSESSMENT — PAIN - FUNCTIONAL ASSESSMENT: PAIN_FUNCTIONAL_ASSESSMENT: 0-10

## 2024-09-01 NOTE — ED PROVIDER NOTES
Hypertension Mother     Cancer Maternal Grandmother         brain tumor    Migraines Mother         SOCIAL HISTORY       Social History     Socioeconomic History    Marital status: Single   Tobacco Use    Smoking status: Every Day     Current packs/day: 0.25     Average packs/day: 0.3 packs/day for 5.0 years (1.3 ttl pk-yrs)     Types: Cigarettes     Passive exposure: Never    Smokeless tobacco: Never   Vaping Use    Vaping status: Never Used   Substance and Sexual Activity    Alcohol use: Yes     Alcohol/week: 1.0 standard drink of alcohol     Comment: Rarely    Drug use: No    Sexual activity: Yes     Partners: Male     Birth control/protection: None     Social Determinants of Health     Financial Resource Strain: Low Risk  (6/30/2023)    Overall Financial Resource Strain (CARDIA)     Difficulty of Paying Living Expenses: Not very hard   Transportation Needs: Unknown (6/30/2023)    PRAPARE - Transportation     Lack of Transportation (Non-Medical): No   Housing Stability: Unknown (6/30/2023)    Housing Stability Vital Sign     Unstable Housing in the Last Year: No       PHYSICAL EXAM       ED Triage Vitals [09/01/24 0128]   BP Systolic BP Percentile Diastolic BP Percentile Temp Temp Source Pulse Respirations SpO2   124/82 -- -- 98.2 °F (36.8 °C) Oral 86 19 100 %      Height Weight - Scale         1.6 m (5' 3\") 92 kg (202 lb 13.2 oz)           Physical Exam  Constitutional:       Appearance: Normal appearance.   HENT:      Head: Normocephalic and atraumatic.      Nose: Nose normal.      Mouth/Throat:      Mouth: Mucous membranes are moist.   Eyes:      General: No scleral icterus.     Extraocular Movements: Extraocular movements intact.   Cardiovascular:      Rate and Rhythm: Normal rate.      Pulses: Normal pulses.   Pulmonary:      Effort: Pulmonary effort is normal.      Breath sounds: Normal breath sounds.   Abdominal:      General: Abdomen is flat.   Musculoskeletal:         General: No deformity or signs of

## 2024-09-01 NOTE — ED TRIAGE NOTES
Patient came in ambulatory to triage with complaints of lower back pain describes as aching, non-radiating and rated as 5/10 accompanied with burning urination and noticed some blood in urine today. Denies nausea, vomiting or even diarrhea. No medication taken. Had history of UTI before

## 2024-09-01 NOTE — DISCHARGE INSTRUCTIONS
Your testing showed no obvious urinary tract infection and no pregnancy.  Is unclear exactly why you are having this low back pain, trying Tylenol and Motrin for pain as needed.  Return to the ER if symptoms change or worsen or new symptoms arise.  Follow-up with your primary doctor for further evaluation as soon as possible

## 2024-10-19 LAB
T3 SERPL-MCNC: 147 NG/DL (ref 71–180)
T4 FREE SERPL-MCNC: 0.34 NG/DL (ref 0.82–1.77)
TSI SER-ACNC: 1.74 IU/L (ref 0–0.55)

## 2024-10-24 ENCOUNTER — OFFICE VISIT (OUTPATIENT)
Age: 39
End: 2024-10-24

## 2024-10-24 VITALS
HEART RATE: 89 BPM | OXYGEN SATURATION: 99 % | SYSTOLIC BLOOD PRESSURE: 100 MMHG | HEIGHT: 63 IN | RESPIRATION RATE: 16 BRPM | BODY MASS INDEX: 36.38 KG/M2 | WEIGHT: 205.3 LBS | DIASTOLIC BLOOD PRESSURE: 67 MMHG

## 2024-10-24 DIAGNOSIS — E05.00 THYROTOXICOSIS WITH DIFFUSE GOITER WITHOUT THYROTOXIC CRISIS OR STORM: Primary | ICD-10-CM

## 2024-10-24 RX ORDER — METHIMAZOLE 10 MG/1
10 TABLET ORAL DAILY
Qty: 90 TABLET | Refills: 0 | Status: SHIPPED | OUTPATIENT
Start: 2024-10-24

## 2024-10-24 RX ORDER — LEVOTHYROXINE SODIUM 150 UG/1
150 TABLET ORAL DAILY
Qty: 90 TABLET | Refills: 3 | Status: SHIPPED | OUTPATIENT
Start: 2024-10-24

## 2024-10-24 NOTE — PROGRESS NOTES
onto the skin daily, Disp: 42 patch, Rfl: 11    lisinopril-hydroCHLOROthiazide (PRINZIDE;ZESTORETIC) 20-12.5 MG per tablet, Take 1 tablet by mouth daily, Disp: 90 tablet, Rfl: 3    EPINEPHrine (EPIPEN 2-ESTEE) 0.3 MG/0.3ML SOAJ injection, Inject 0.3 mg intramuscularly as needed for Severe allergic reaction, Disp: 2 each, Rfl: 0     Social Hx: living lesly vela,  for D4P      Review of Systems:   See HPI       Physical Examination:    Visit Vitals  Blood pressure 100/67, pulse 89, resp. rate 16, height 1.6 m (5' 3\"), weight 93.1 kg (205 lb 4.8 oz), SpO2 99%.    - GENERAL: NCAT, Appears well nourished   - EYES: EOMI, non-icteric, no proptosis   - Ear/Nose/Throat: Thyromegaly is present, right greater than left  - CARDIOVASCULAR: no cyanosis, no visible JVD   - RESPIRATORY: respiratory effort normal without any distress  or labored breathing   - MUSCULOSKELETAL: Normal ROM of neck and upper extremities observed   - SKIN: No rash on face  - NEUROLOGIC:  No facial asymmetry (Cranial nerve 7 motor function), No gaze palsy   - PSYCHIATRIC: Normal affect,  Normal insight and judgement       Data Reviewed:                        Assessment/Plan: This is a very pleasant 37-year-old female presenting in follow-up for discussion related to Graves' disease and PCOS in the preconception setting.      We initially started her on methimazole 20 mg twice daily for 1 month.  With this dose, labs normalized.  I then switched her over to PTU in anticipation of conception on April 16th 2021.  With the increase in dosing from twice daily to 3 times daily,  she missed some doses.  I subsequently increased the dose not knowing that she had missed doses.  We previously discussed the risk of congenital defects with both PTU and methimazole, specifically aplasia cutis.Looking into details of the 14 cases, 7 children were diagnosed with a birth defect in the  face and neck region (preauricular and branchial sinus/fistula/cyst)

## 2024-10-25 ENCOUNTER — TELEPHONE (OUTPATIENT)
Age: 39
End: 2024-10-25

## 2024-10-25 NOTE — TELEPHONE ENCOUNTER
[facility] pharmacy called and lve that stated they received 2 rx. One was for Methimazole and the other one is for Levothyorine. Pharmacy would like to know which rx is being prescribed. I then called and lvm making the pharmacy aware that the correct rx will be Methimazole 10 mg.

## 2024-10-28 NOTE — TELEPHONE ENCOUNTER
[facility] pharmacy was  made aware via voicemail of the reason why 2 different thyroid medications were prescribed.

## 2024-11-01 ENCOUNTER — TELEPHONE (OUTPATIENT)
Age: 39
End: 2024-11-01

## 2025-02-25 LAB
ALBUMIN SERPL-MCNC: 4.5 G/DL (ref 3.9–4.9)
ALP SERPL-CCNC: 71 IU/L (ref 44–121)
ALT SERPL-CCNC: 6 IU/L (ref 0–32)
AST SERPL-CCNC: 15 IU/L (ref 0–40)
BASOPHILS # BLD AUTO: 0.1 X10E3/UL (ref 0–0.2)
BASOPHILS NFR BLD AUTO: 1 %
BILIRUB DIRECT SERPL-MCNC: 0.14 MG/DL (ref 0–0.4)
BILIRUB SERPL-MCNC: 0.4 MG/DL (ref 0–1.2)
EOSINOPHIL # BLD AUTO: 0.1 X10E3/UL (ref 0–0.4)
EOSINOPHIL NFR BLD AUTO: 1 %
ERYTHROCYTE [DISTWIDTH] IN BLOOD BY AUTOMATED COUNT: 13.8 % (ref 11.7–15.4)
HCT VFR BLD AUTO: 41.6 % (ref 34–46.6)
HGB BLD-MCNC: 13.5 G/DL (ref 11.1–15.9)
IMM GRANULOCYTES # BLD AUTO: 0 X10E3/UL (ref 0–0.1)
IMM GRANULOCYTES NFR BLD AUTO: 0 %
LYMPHOCYTES # BLD AUTO: 1.9 X10E3/UL (ref 0.7–3.1)
LYMPHOCYTES NFR BLD AUTO: 26 %
MCH RBC QN AUTO: 27.8 PG (ref 26.6–33)
MCHC RBC AUTO-ENTMCNC: 32.5 G/DL (ref 31.5–35.7)
MCV RBC AUTO: 86 FL (ref 79–97)
MONOCYTES # BLD AUTO: 0.8 X10E3/UL (ref 0.1–0.9)
MONOCYTES NFR BLD AUTO: 11 %
NEUTROPHILS # BLD AUTO: 4.6 X10E3/UL (ref 1.4–7)
NEUTROPHILS NFR BLD AUTO: 61 %
PLATELET # BLD AUTO: 229 X10E3/UL (ref 150–450)
PROT SERPL-MCNC: 7.4 G/DL (ref 6–8.5)
RBC # BLD AUTO: 4.85 X10E6/UL (ref 3.77–5.28)
T3 SERPL-MCNC: 99 NG/DL (ref 71–180)
T4 FREE SERPL-MCNC: 0.44 NG/DL (ref 0.82–1.77)
TSH SERPL DL<=0.005 MIU/L-ACNC: 7.04 UIU/ML (ref 0.45–4.5)
WBC # BLD AUTO: 7.5 X10E3/UL (ref 3.4–10.8)

## 2025-02-25 RX ORDER — METHIMAZOLE 10 MG/1
10 TABLET ORAL DAILY
Qty: 90 TABLET | Refills: 0 | Status: SHIPPED | OUTPATIENT
Start: 2025-02-25 | End: 2025-02-26 | Stop reason: SDUPTHER

## 2025-02-26 ENCOUNTER — OFFICE VISIT (OUTPATIENT)
Age: 40
End: 2025-02-26
Payer: COMMERCIAL

## 2025-02-26 VITALS
HEART RATE: 93 BPM | BODY MASS INDEX: 36.73 KG/M2 | HEIGHT: 63 IN | RESPIRATION RATE: 16 BRPM | SYSTOLIC BLOOD PRESSURE: 120 MMHG | DIASTOLIC BLOOD PRESSURE: 82 MMHG | OXYGEN SATURATION: 98 % | WEIGHT: 207.3 LBS

## 2025-02-26 DIAGNOSIS — E05.00 THYROTOXICOSIS WITH DIFFUSE GOITER WITHOUT THYROTOXIC CRISIS OR STORM: Primary | ICD-10-CM

## 2025-02-26 LAB — TSI SER-ACNC: 1.32 IU/L (ref 0–0.55)

## 2025-02-26 PROCEDURE — G2211 COMPLEX E/M VISIT ADD ON: HCPCS | Performed by: INTERNAL MEDICINE

## 2025-02-26 PROCEDURE — 3074F SYST BP LT 130 MM HG: CPT | Performed by: INTERNAL MEDICINE

## 2025-02-26 PROCEDURE — 99214 OFFICE O/P EST MOD 30 MIN: CPT | Performed by: INTERNAL MEDICINE

## 2025-02-26 PROCEDURE — 3079F DIAST BP 80-89 MM HG: CPT | Performed by: INTERNAL MEDICINE

## 2025-02-26 RX ORDER — METHIMAZOLE 10 MG/1
5 TABLET ORAL DAILY
Qty: 45 TABLET | Refills: 3 | Status: SHIPPED | OUTPATIENT
Start: 2025-02-26

## 2025-02-26 NOTE — PROGRESS NOTES
Chief Complaint   Patient presents with    Thyroid Problem        History of Present Illness: Barbie Dover is a  39 y.o.female with a past medical hx significant for HTN, headaches presenting in referral  from Dav Serrano MD for Graves'.      Went to have a physical in December who noticed she had a large thyroid. Reports that she's never noticed thyroid enlargement until December. Works as schoolbus . Notes tremor, has had that for a few months. Thought she was just nervous. Has gained  40 lbs- since September- before this started was 145, now 185 - is 6\"6. Just started taking biotin 1 week ago- is taking 1 2500mcg of biotin. Was taking B and C complex vitamins- b12. No iodine. No CT scans with contrast. Does endorse some pain when turns  to the left, numbness in the R cheek. Chokes on liquids.      Is interested in pursuing fertility. Going back to school on Feb 22nd- end of March.  Experienced an ectopic pregnancy 12 years ago and has been trying to conceive for the past 5 years without success.  Has seen fertility specialists regarding this.   Menstrual periods are very irregular.  Is actively pursuing fertility at this time.      03/17/2021: Has been taking 4 a day- has been doing that since I sent in rx. Doesn't hurt when she moves her hand, still has a little shake.  It is not as bad. Still forgets everything. PCP recently started lisinopril/hctz- was not told number. 153/101 in the office- does not check at home.      04/22/2021: Switched over to PTU 04/14, repeat labs are due May 3rd. Boyfriend has semena nalysis pending next week.       06/28/2021:Hasn't been taking med 3x daily- got back on track last week. Forgot to email me back. Went back to repro when she was on a cycle- waiting of boyfriend to do the semen analysis, labs.       07/27/2022: Still interested in pursuing fertility, previous relationship did end.  Missing the evening dose of PTU frequently.  Not taking  a prenatal

## 2025-03-04 ENCOUNTER — OFFICE VISIT (OUTPATIENT)
Age: 40
End: 2025-03-04
Payer: COMMERCIAL

## 2025-03-04 VITALS
TEMPERATURE: 98.3 F | SYSTOLIC BLOOD PRESSURE: 138 MMHG | DIASTOLIC BLOOD PRESSURE: 90 MMHG | HEIGHT: 63 IN | RESPIRATION RATE: 16 BRPM | OXYGEN SATURATION: 99 % | WEIGHT: 211.2 LBS | BODY MASS INDEX: 37.42 KG/M2 | HEART RATE: 88 BPM

## 2025-03-04 DIAGNOSIS — E05.00 GRAVES DISEASE: Primary | ICD-10-CM

## 2025-03-04 PROCEDURE — 3075F SYST BP GE 130 - 139MM HG: CPT | Performed by: SURGERY

## 2025-03-04 PROCEDURE — 99204 OFFICE O/P NEW MOD 45 MIN: CPT | Performed by: SURGERY

## 2025-03-04 PROCEDURE — 3080F DIAST BP >= 90 MM HG: CPT | Performed by: SURGERY

## 2025-03-04 ASSESSMENT — ENCOUNTER SYMPTOMS
WHEEZING: 0
STRIDOR: 0
SORE THROAT: 0
ABDOMINAL PAIN: 0
SHORTNESS OF BREATH: 0
DIARRHEA: 0
BACK PAIN: 0
BLOOD IN STOOL: 0
CONSTIPATION: 0
NAUSEA: 0
COUGH: 0
EYE PAIN: 0
VOMITING: 0

## 2025-03-04 ASSESSMENT — PATIENT HEALTH QUESTIONNAIRE - PHQ9
SUM OF ALL RESPONSES TO PHQ QUESTIONS 1-9: 0
2. FEELING DOWN, DEPRESSED OR HOPELESS: NOT AT ALL
SUM OF ALL RESPONSES TO PHQ QUESTIONS 1-9: 0
SUM OF ALL RESPONSES TO PHQ QUESTIONS 1-9: 0
1. LITTLE INTEREST OR PLEASURE IN DOING THINGS: NOT AT ALL
SUM OF ALL RESPONSES TO PHQ QUESTIONS 1-9: 0

## 2025-03-04 NOTE — H&P (VIEW-ONLY)
Subjective:      Patient ID: Barbie Dover is a 39 y.o. female who comes in for consultation by Maura Hodge MD and Dav Serrano MD for Graves disease      Chief Complaint   Patient presents with    Thyroid Problem     Seen at the request of Dr. Maura Hodge for evaluation of thyroid.       Thyroid Problem  Patient reports no anxiety, constipation, diaphoresis, diarrhea, fatigue or palpitations.       She was told she had a goiter several years ago.  She saw her Dav Serrano MD and was found to be hyperthyroid.  Maura Hodge MD has been treating her with MMI and she has had fair control of symptoms.  She desires fertility and has had issues. She reports mild dysphagia but denies voice changes, palpitations, SOB/RIOS, unexplained weight changes.    Past Medical History:   Diagnosis Date    Goiter     Headache     Hypertension      Past Surgical History:   Procedure Laterality Date    GYN      ectopic pregnancy - lapartomy     Family History   Problem Relation Age of Onset    Hypertension Mother     Cancer Maternal Grandmother         brain tumor    Migraines Mother      Social History     Tobacco Use    Smoking status: Every Day     Current packs/day: 0.25     Average packs/day: 0.3 packs/day for 5.0 years (1.3 ttl pk-yrs)     Types: Cigarettes     Passive exposure: Never    Smokeless tobacco: Never   Vaping Use    Vaping status: Never Used   Substance Use Topics    Alcohol use: Yes     Alcohol/week: 1.0 standard drink of alcohol     Comment: Rarely    Drug use: No     Current Outpatient Medications   Medication Sig    methIMAzole (TAPAZOLE) 10 MG tablet Take 0.5 tablets by mouth daily    metFORMIN (GLUCOPHAGE-XR) 500 MG extended release tablet TAKE 1 TABLET BY MOUTH ONCE DAILY (WITH DINNER)  DAYS    lisinopril-hydroCHLOROthiazide (PRINZIDE;ZESTORETIC) 20-12.5 MG per tablet Take 1 tablet by mouth daily    EPINEPHrine (EPIPEN 2-ESTEE) 0.3 MG/0.3ML SOAJ injection Inject 0.3 mg intramuscularly as  surgery include bleeding (potentially requiring emergent exploration at bedside), infection, parathyroid injury/removal requiring supplemental calcium +/- vit d, recurrent laryngeal/superior laryngeal nerve injury resulting in vocal cord paralysis, voice changes and fatigue, aspiration, further surgery, need for lifelong thyroid supplementation.  2.  Essential hypertension  3.  PCOS  4.  Tobacco abuse.  Urged cessation      She desires total thyroidectomy under general anesthesia with a NIM tube as an outpatient with an overnight stay      Miguel Angel Horowitz MD FACS

## 2025-03-04 NOTE — PROGRESS NOTES
Identified pt with two pt identifiers (name and ). Reviewed chart in preparation for visit and have obtained necessary documentation.    Barbie Dover is a 39 y.o. female Thyroid Problem (Seen at the request of Dr. Maura Hodge for evaluation of thyroid.)  .    Vitals:    25 1026 25 1030   BP: (!) 136/90 (!) 138/90   Site: Right Upper Arm Left Upper Arm   Position: Sitting Sitting   Cuff Size: Small Adult Small Adult   Pulse: 88    Resp: 16    Temp: 98.3 °F (36.8 °C)    TempSrc: Oral    SpO2: 99%    Weight: 95.8 kg (211 lb 3.2 oz)    Height: 1.6 m (5' 3\")           1. Have you been to the ER, urgent care clinic since your last visit?  Hospitalized since your last visit?  no     2. Have you seen or consulted any other health care providers outside of the [facility] since your last visit?  Include any pap smears or colon screening.  No  Patient and provider made aware of elevated BP x2. Patient asymptomatic. Patient reminded to monitor BP, continue to take BP medications if prescribed, and follow up with PCP/Cardiologist.  Patient expressed understanding and agreement.       
needed for Severe allergic reaction    levothyroxine (SYNTHROID) 150 MCG tablet Take 1 tablet by mouth daily (Patient not taking: Reported on 2/26/2025)    nicotine (NICODERM CQ) 21 MG/24HR Place 1 patch onto the skin daily     No current facility-administered medications for this visit.     Allergies   Allergen Reactions    Peanut (Diagnostic) Anaphylaxis         Review of Systems   Constitutional:  Negative for chills, diaphoresis, fatigue, fever and unexpected weight change.   HENT:  Negative for congestion, ear pain and sore throat.    Eyes:  Negative for pain.   Respiratory:  Negative for cough, shortness of breath, wheezing and stridor.    Cardiovascular:  Negative for chest pain, palpitations and leg swelling.   Gastrointestinal:  Negative for abdominal pain, blood in stool, constipation, diarrhea, nausea and vomiting.   Endocrine: Negative for polydipsia.   Genitourinary:  Negative for dysuria, flank pain, frequency, hematuria and urgency.   Musculoskeletal:  Negative for arthralgias, back pain, gait problem and myalgias.   Neurological:  Negative for dizziness, seizures, weakness, numbness and headaches.   Psychiatric/Behavioral:  Negative for behavioral problems. The patient is not nervous/anxious.          BP (!) 138/90 (Site: Left Upper Arm, Position: Sitting, Cuff Size: Small Adult)   Pulse 88   Temp 98.3 °F (36.8 °C) (Oral)   Resp 16   Ht 1.6 m (5' 3\")   Wt 95.8 kg (211 lb 3.2 oz)   SpO2 99%   BMI 37.41 kg/m²     Objective:   Physical Exam  Vitals and nursing note reviewed. Exam conducted with a chaperone present.   Constitutional:       General: She is not in acute distress.     Appearance: Normal appearance. She is well-developed. She is not ill-appearing or diaphoretic.   HENT:      Head: Normocephalic and atraumatic.   Eyes:      General: No scleral icterus.     Extraocular Movements: Extraocular movements intact.      Conjunctiva/sclera: Conjunctivae normal.      Pupils: Pupils are equal,

## 2025-03-05 ENCOUNTER — PREP FOR PROCEDURE (OUTPATIENT)
Age: 40
End: 2025-03-05

## 2025-03-05 DIAGNOSIS — E05.00 GRAVES' DISEASE: ICD-10-CM

## 2025-03-25 NOTE — PROGRESS NOTES
[facility]  Preoperative Instructions        Surgery Date April 2           Time of Arrival to be called on Tuesday April 1 between 2 and 5 pm  Contact#[phone]     On the day of your surgery, please report to Surgical Services Registration Desk and sign in at your designated time.  The Surgery Center is located to the right of the Emergency Room.     2. You must have someone with you to drive you home. You should not drive a car for 24 hours following surgery. Please make arrangements for a friend or family member to stay with you for the first 24 hours after your surgery.    3. Do not have anything to eat or drink (including water, gum, mints, coffee, juice) after midnight April 1??      .?This may not apply to medications prescribed by your physician. ?(Please note below the special instructions with medications to take the morning of your procedure.)    4. We recommend you do not drink any alcoholic beverages for 24 hours before and after your surgery.    5. Contact your surgeon's office for instructions on the following medications: non-steroidal anti-inflammatory drugs (i.e. Advil, Aleve), vitamins, and supplements. (Some surgeon's will want you to stop these medications prior to surgery and others may allow you to take them)  **If you are currently taking Plavix, Coumadin, Aspirin and/or other blood-thinning agents, contact your surgeon for instructions.** Your surgeon will partner with the physician prescribing these medications to determine if it is safe to stop or if you need to continue taking.  Please do not stop taking these medications without instructions from your surgeon    6. Wear comfortable clothes.  Wear glasses instead of contacts.  Do not bring any money or jewelry. Please bring picture ID, insurance card, and any prearranged co-payment or hospital payment.  Do not wear make-up, particularly mascara the morning of your surgery.  Do not wear nail polish, particularly

## 2025-04-01 ENCOUNTER — ANESTHESIA EVENT (OUTPATIENT)
Facility: HOSPITAL | Age: 40
End: 2025-04-01
Payer: COMMERCIAL

## 2025-04-02 ENCOUNTER — ANESTHESIA (OUTPATIENT)
Facility: HOSPITAL | Age: 40
End: 2025-04-02
Payer: COMMERCIAL

## 2025-04-02 ENCOUNTER — HOSPITAL ENCOUNTER (OUTPATIENT)
Facility: HOSPITAL | Age: 40
Discharge: HOME | End: 2025-04-03
Attending: SURGERY | Admitting: SURGERY
Payer: COMMERCIAL

## 2025-04-02 DIAGNOSIS — E05.00 GRAVES' DISEASE: Primary | ICD-10-CM

## 2025-04-02 LAB
ALBUMIN SERPL-MCNC: 4.2 G/DL (ref 3.9–4.9)
ALP SERPL-CCNC: 62 IU/L (ref 44–121)
ALT SERPL-CCNC: 10 IU/L (ref 0–32)
AST SERPL-CCNC: 16 IU/L (ref 0–40)
BASOPHILS # BLD AUTO: 0 X10E3/UL (ref 0–0.2)
BASOPHILS NFR BLD AUTO: 1 %
BILIRUB DIRECT SERPL-MCNC: 0.15 MG/DL (ref 0–0.4)
BILIRUB SERPL-MCNC: 0.4 MG/DL (ref 0–1.2)
CALCIUM SERPL-MCNC: 8.2 MG/DL (ref 8.5–10.1)
CALCIUM SERPL-MCNC: 8.6 MG/DL (ref 8.5–10.1)
CALCIUM SERPL-MCNC: 8.6 MG/DL (ref 8.5–10.1)
EOSINOPHIL # BLD AUTO: 0.1 X10E3/UL (ref 0–0.4)
EOSINOPHIL NFR BLD AUTO: 1 %
ERYTHROCYTE [DISTWIDTH] IN BLOOD BY AUTOMATED COUNT: 13.7 % (ref 11.7–15.4)
HCG UR QL: NEGATIVE
HCT VFR BLD AUTO: 40.4 % (ref 34–46.6)
HGB BLD-MCNC: 13.4 G/DL (ref 11.1–15.9)
IMM GRANULOCYTES # BLD AUTO: 0 X10E3/UL (ref 0–0.1)
IMM GRANULOCYTES NFR BLD AUTO: 0 %
LYMPHOCYTES # BLD AUTO: 2.3 X10E3/UL (ref 0.7–3.1)
LYMPHOCYTES NFR BLD AUTO: 35 %
MCH RBC QN AUTO: 28.6 PG (ref 26.6–33)
MCHC RBC AUTO-ENTMCNC: 33.2 G/DL (ref 31.5–35.7)
MCV RBC AUTO: 86 FL (ref 79–97)
MONOCYTES # BLD AUTO: 0.6 X10E3/UL (ref 0.1–0.9)
MONOCYTES NFR BLD AUTO: 10 %
NEUTROPHILS # BLD AUTO: 3.4 X10E3/UL (ref 1.4–7)
NEUTROPHILS NFR BLD AUTO: 53 %
PLATELET # BLD AUTO: 201 X10E3/UL (ref 150–450)
PROT SERPL-MCNC: 7 G/DL (ref 6–8.5)
RBC # BLD AUTO: 4.69 X10E6/UL (ref 3.77–5.28)
T3 SERPL-MCNC: 103 NG/DL (ref 71–180)
T4 FREE SERPL-MCNC: 0.45 NG/DL (ref 0.82–1.77)
TSH SERPL DL<=0.005 MIU/L-ACNC: 4.67 UIU/ML (ref 0.45–4.5)
TSI SER-ACNC: 1.17 IU/L (ref 0–0.55)
WBC # BLD AUTO: 6.4 X10E3/UL (ref 3.4–10.8)

## 2025-04-02 PROCEDURE — 6360000002 HC RX W HCPCS: Performed by: SURGERY

## 2025-04-02 PROCEDURE — 81025 URINE PREGNANCY TEST: CPT

## 2025-04-02 PROCEDURE — 7100000000 HC PACU RECOVERY - FIRST 15 MIN: Performed by: SURGERY

## 2025-04-02 PROCEDURE — 6360000002 HC RX W HCPCS: Performed by: STUDENT IN AN ORGANIZED HEALTH CARE EDUCATION/TRAINING PROGRAM

## 2025-04-02 PROCEDURE — 3700000001 HC ADD 15 MINUTES (ANESTHESIA): Performed by: SURGERY

## 2025-04-02 PROCEDURE — 2500000003 HC RX 250 WO HCPCS: Performed by: SURGERY

## 2025-04-02 PROCEDURE — 6360000002 HC RX W HCPCS

## 2025-04-02 PROCEDURE — 3600000004 HC SURGERY LEVEL 4 BASE: Performed by: SURGERY

## 2025-04-02 PROCEDURE — 3700000000 HC ANESTHESIA ATTENDED CARE: Performed by: SURGERY

## 2025-04-02 PROCEDURE — 2580000003 HC RX 258: Performed by: ANESTHESIOLOGY

## 2025-04-02 PROCEDURE — 7100000001 HC PACU RECOVERY - ADDTL 15 MIN: Performed by: SURGERY

## 2025-04-02 PROCEDURE — 36415 COLL VENOUS BLD VENIPUNCTURE: CPT

## 2025-04-02 PROCEDURE — 2500000003 HC RX 250 WO HCPCS

## 2025-04-02 PROCEDURE — 2709999900 HC NON-CHARGEABLE SUPPLY: Performed by: SURGERY

## 2025-04-02 PROCEDURE — 3600000014 HC SURGERY LEVEL 4 ADDTL 15MIN: Performed by: SURGERY

## 2025-04-02 PROCEDURE — 88307 TISSUE EXAM BY PATHOLOGIST: CPT

## 2025-04-02 PROCEDURE — 2720000010 HC SURG SUPPLY STERILE: Performed by: SURGERY

## 2025-04-02 PROCEDURE — 2580000003 HC RX 258

## 2025-04-02 PROCEDURE — 82310 ASSAY OF CALCIUM: CPT

## 2025-04-02 RX ORDER — SUCCINYLCHOLINE/SOD CL,ISO/PF 200MG/10ML
SYRINGE (ML) INTRAVENOUS
Status: DISCONTINUED | OUTPATIENT
Start: 2025-04-02 | End: 2025-04-02 | Stop reason: SDUPTHER

## 2025-04-02 RX ORDER — SODIUM CHLORIDE 0.9 % (FLUSH) 0.9 %
5-40 SYRINGE (ML) INJECTION PRN
Status: DISCONTINUED | OUTPATIENT
Start: 2025-04-02 | End: 2025-04-03 | Stop reason: HOSPADM

## 2025-04-02 RX ORDER — DEXMEDETOMIDINE HYDROCHLORIDE 100 UG/ML
INJECTION, SOLUTION INTRAVENOUS
Status: DISCONTINUED | OUTPATIENT
Start: 2025-04-02 | End: 2025-04-02 | Stop reason: SDUPTHER

## 2025-04-02 RX ORDER — SODIUM CHLORIDE, SODIUM LACTATE, POTASSIUM CHLORIDE, CALCIUM CHLORIDE 600; 310; 30; 20 MG/100ML; MG/100ML; MG/100ML; MG/100ML
INJECTION, SOLUTION INTRAVENOUS
Status: DISCONTINUED | OUTPATIENT
Start: 2025-04-02 | End: 2025-04-02 | Stop reason: SDUPTHER

## 2025-04-02 RX ORDER — IPRATROPIUM BROMIDE AND ALBUTEROL SULFATE 2.5; .5 MG/3ML; MG/3ML
1 SOLUTION RESPIRATORY (INHALATION)
Status: DISCONTINUED | OUTPATIENT
Start: 2025-04-02 | End: 2025-04-02 | Stop reason: HOSPADM

## 2025-04-02 RX ORDER — HYDROMORPHONE HYDROCHLORIDE 1 MG/ML
0.5 INJECTION, SOLUTION INTRAMUSCULAR; INTRAVENOUS; SUBCUTANEOUS EVERY 5 MIN PRN
Status: COMPLETED | OUTPATIENT
Start: 2025-04-02 | End: 2025-04-02

## 2025-04-02 RX ORDER — PHENYLEPHRINE HCL IN 0.9% NACL 0.4MG/10ML
SYRINGE (ML) INTRAVENOUS
Status: DISCONTINUED | OUTPATIENT
Start: 2025-04-02 | End: 2025-04-02 | Stop reason: SDUPTHER

## 2025-04-02 RX ORDER — OXYCODONE HYDROCHLORIDE 5 MG/1
5 TABLET ORAL EVERY 4 HOURS PRN
Refills: 0 | Status: DISCONTINUED | OUTPATIENT
Start: 2025-04-02 | End: 2025-04-03 | Stop reason: HOSPADM

## 2025-04-02 RX ORDER — PROCHLORPERAZINE EDISYLATE 5 MG/ML
5 INJECTION INTRAMUSCULAR; INTRAVENOUS
Status: DISCONTINUED | OUTPATIENT
Start: 2025-04-02 | End: 2025-04-02 | Stop reason: HOSPADM

## 2025-04-02 RX ORDER — SODIUM CHLORIDE 9 MG/ML
INJECTION, SOLUTION INTRAVENOUS PRN
Status: DISCONTINUED | OUTPATIENT
Start: 2025-04-02 | End: 2025-04-02 | Stop reason: HOSPADM

## 2025-04-02 RX ORDER — HYDROCHLOROTHIAZIDE 25 MG/1
12.5 TABLET ORAL DAILY
Status: DISCONTINUED | OUTPATIENT
Start: 2025-04-02 | End: 2025-04-03 | Stop reason: HOSPADM

## 2025-04-02 RX ORDER — LISINOPRIL 20 MG/1
20 TABLET ORAL DAILY
Status: DISCONTINUED | OUTPATIENT
Start: 2025-04-02 | End: 2025-04-03 | Stop reason: HOSPADM

## 2025-04-02 RX ORDER — METFORMIN HYDROCHLORIDE 500 MG/1
500 TABLET, EXTENDED RELEASE ORAL
Status: DISCONTINUED | OUTPATIENT
Start: 2025-04-02 | End: 2025-04-03 | Stop reason: HOSPADM

## 2025-04-02 RX ORDER — ONDANSETRON 4 MG/1
4 TABLET, ORALLY DISINTEGRATING ORAL EVERY 8 HOURS PRN
Status: DISCONTINUED | OUTPATIENT
Start: 2025-04-02 | End: 2025-04-03 | Stop reason: HOSPADM

## 2025-04-02 RX ORDER — SODIUM CHLORIDE 0.9 % (FLUSH) 0.9 %
5-40 SYRINGE (ML) INJECTION EVERY 12 HOURS SCHEDULED
Status: DISCONTINUED | OUTPATIENT
Start: 2025-04-02 | End: 2025-04-02 | Stop reason: HOSPADM

## 2025-04-02 RX ORDER — EPHEDRINE SULFATE/0.9% NACL/PF 25 MG/5 ML
SYRINGE (ML) INTRAVENOUS
Status: DISCONTINUED | OUTPATIENT
Start: 2025-04-02 | End: 2025-04-02 | Stop reason: SDUPTHER

## 2025-04-02 RX ORDER — SODIUM CHLORIDE, SODIUM LACTATE, POTASSIUM CHLORIDE, CALCIUM CHLORIDE 600; 310; 30; 20 MG/100ML; MG/100ML; MG/100ML; MG/100ML
INJECTION, SOLUTION INTRAVENOUS CONTINUOUS
Status: DISCONTINUED | OUTPATIENT
Start: 2025-04-02 | End: 2025-04-02 | Stop reason: HOSPADM

## 2025-04-02 RX ORDER — GLUCAGON 1 MG/ML
1 KIT INJECTION PRN
Status: DISCONTINUED | OUTPATIENT
Start: 2025-04-02 | End: 2025-04-02 | Stop reason: HOSPADM

## 2025-04-02 RX ORDER — LIDOCAINE HYDROCHLORIDE 20 MG/ML
INJECTION, SOLUTION EPIDURAL; INFILTRATION; INTRACAUDAL; PERINEURAL
Status: DISCONTINUED | OUTPATIENT
Start: 2025-04-02 | End: 2025-04-02 | Stop reason: SDUPTHER

## 2025-04-02 RX ORDER — NALOXONE HYDROCHLORIDE 0.4 MG/ML
INJECTION, SOLUTION INTRAMUSCULAR; INTRAVENOUS; SUBCUTANEOUS PRN
Status: DISCONTINUED | OUTPATIENT
Start: 2025-04-02 | End: 2025-04-02 | Stop reason: HOSPADM

## 2025-04-02 RX ORDER — OXYCODONE HYDROCHLORIDE 5 MG/1
5 TABLET ORAL EVERY 6 HOURS PRN
Qty: 12 TABLET | Refills: 0 | Status: SHIPPED | OUTPATIENT
Start: 2025-04-02 | End: 2025-04-05

## 2025-04-02 RX ORDER — SODIUM CHLORIDE 0.9 % (FLUSH) 0.9 %
5-40 SYRINGE (ML) INJECTION EVERY 12 HOURS SCHEDULED
Status: DISCONTINUED | OUTPATIENT
Start: 2025-04-02 | End: 2025-04-03 | Stop reason: HOSPADM

## 2025-04-02 RX ORDER — FENTANYL CITRATE 50 UG/ML
INJECTION, SOLUTION INTRAMUSCULAR; INTRAVENOUS
Status: DISCONTINUED | OUTPATIENT
Start: 2025-04-02 | End: 2025-04-02 | Stop reason: SDUPTHER

## 2025-04-02 RX ORDER — DEXAMETHASONE SODIUM PHOSPHATE 4 MG/ML
INJECTION, SOLUTION INTRA-ARTICULAR; INTRALESIONAL; INTRAMUSCULAR; INTRAVENOUS; SOFT TISSUE
Status: DISCONTINUED | OUTPATIENT
Start: 2025-04-02 | End: 2025-04-02 | Stop reason: SDUPTHER

## 2025-04-02 RX ORDER — ONDANSETRON 2 MG/ML
4 INJECTION INTRAMUSCULAR; INTRAVENOUS
Status: COMPLETED | OUTPATIENT
Start: 2025-04-02 | End: 2025-04-02

## 2025-04-02 RX ORDER — ONDANSETRON 2 MG/ML
INJECTION INTRAMUSCULAR; INTRAVENOUS
Status: DISCONTINUED | OUTPATIENT
Start: 2025-04-02 | End: 2025-04-02 | Stop reason: SDUPTHER

## 2025-04-02 RX ORDER — ONDANSETRON 2 MG/ML
4 INJECTION INTRAMUSCULAR; INTRAVENOUS EVERY 6 HOURS PRN
Status: DISCONTINUED | OUTPATIENT
Start: 2025-04-02 | End: 2025-04-03 | Stop reason: HOSPADM

## 2025-04-02 RX ORDER — HYDROMORPHONE HYDROCHLORIDE 1 MG/ML
0.25 INJECTION, SOLUTION INTRAMUSCULAR; INTRAVENOUS; SUBCUTANEOUS
Refills: 0 | Status: DISCONTINUED | OUTPATIENT
Start: 2025-04-02 | End: 2025-04-03 | Stop reason: HOSPADM

## 2025-04-02 RX ORDER — HYDROMORPHONE HYDROCHLORIDE 1 MG/ML
0.5 INJECTION, SOLUTION INTRAMUSCULAR; INTRAVENOUS; SUBCUTANEOUS
Refills: 0 | Status: DISCONTINUED | OUTPATIENT
Start: 2025-04-02 | End: 2025-04-03 | Stop reason: HOSPADM

## 2025-04-02 RX ORDER — DEXTROSE MONOHYDRATE 100 MG/ML
INJECTION, SOLUTION INTRAVENOUS CONTINUOUS PRN
Status: DISCONTINUED | OUTPATIENT
Start: 2025-04-02 | End: 2025-04-02 | Stop reason: HOSPADM

## 2025-04-02 RX ORDER — SODIUM CHLORIDE 9 MG/ML
INJECTION, SOLUTION INTRAVENOUS PRN
Status: DISCONTINUED | OUTPATIENT
Start: 2025-04-02 | End: 2025-04-03 | Stop reason: HOSPADM

## 2025-04-02 RX ORDER — LISINOPRIL AND HYDROCHLOROTHIAZIDE 12.5; 2 MG/1; MG/1
1 TABLET ORAL DAILY
Status: DISCONTINUED | OUTPATIENT
Start: 2025-04-03 | End: 2025-04-02

## 2025-04-02 RX ORDER — PROPOFOL 10 MG/ML
INJECTION, EMULSION INTRAVENOUS
Status: DISCONTINUED | OUTPATIENT
Start: 2025-04-02 | End: 2025-04-02 | Stop reason: SDUPTHER

## 2025-04-02 RX ORDER — MIDAZOLAM HYDROCHLORIDE 1 MG/ML
INJECTION, SOLUTION INTRAMUSCULAR; INTRAVENOUS
Status: DISCONTINUED | OUTPATIENT
Start: 2025-04-02 | End: 2025-04-02 | Stop reason: SDUPTHER

## 2025-04-02 RX ORDER — SODIUM CHLORIDE 0.9 % (FLUSH) 0.9 %
5-40 SYRINGE (ML) INJECTION PRN
Status: DISCONTINUED | OUTPATIENT
Start: 2025-04-02 | End: 2025-04-02 | Stop reason: HOSPADM

## 2025-04-02 RX ORDER — FENTANYL CITRATE 50 UG/ML
25 INJECTION, SOLUTION INTRAMUSCULAR; INTRAVENOUS EVERY 5 MIN PRN
Status: COMPLETED | OUTPATIENT
Start: 2025-04-02 | End: 2025-04-02

## 2025-04-02 RX ADMIN — FENTANYL CITRATE 25 MCG: 50 INJECTION INTRAMUSCULAR; INTRAVENOUS at 11:07

## 2025-04-02 RX ADMIN — Medication 120 MG: at 07:37

## 2025-04-02 RX ADMIN — FENTANYL CITRATE 50 MCG: 50 INJECTION, SOLUTION INTRAMUSCULAR; INTRAVENOUS at 07:35

## 2025-04-02 RX ADMIN — FENTANYL CITRATE 25 MCG: 50 INJECTION, SOLUTION INTRAMUSCULAR; INTRAVENOUS at 07:57

## 2025-04-02 RX ADMIN — SODIUM CHLORIDE, POTASSIUM CHLORIDE, SODIUM LACTATE AND CALCIUM CHLORIDE: 600; 310; 30; 20 INJECTION, SOLUTION INTRAVENOUS at 07:30

## 2025-04-02 RX ADMIN — Medication 40 MCG: at 09:03

## 2025-04-02 RX ADMIN — HYDROMORPHONE HYDROCHLORIDE 0.5 MG: 1 INJECTION, SOLUTION INTRAMUSCULAR; INTRAVENOUS; SUBCUTANEOUS at 10:25

## 2025-04-02 RX ADMIN — FENTANYL CITRATE 25 MCG: 50 INJECTION INTRAMUSCULAR; INTRAVENOUS at 10:55

## 2025-04-02 RX ADMIN — HYDROMORPHONE HYDROCHLORIDE 0.2 MG: 1 INJECTION, SOLUTION INTRAMUSCULAR; INTRAVENOUS; SUBCUTANEOUS at 09:00

## 2025-04-02 RX ADMIN — PHENYLEPHRINE HYDROCHLORIDE 10 MCG/MIN: 10 INJECTION INTRAVENOUS at 07:56

## 2025-04-02 RX ADMIN — DEXMEDETOMIDINE 4 MCG: 100 INJECTION, SOLUTION INTRAVENOUS at 07:47

## 2025-04-02 RX ADMIN — ONDANSETRON 4 MG: 2 INJECTION, SOLUTION INTRAMUSCULAR; INTRAVENOUS at 09:37

## 2025-04-02 RX ADMIN — FENTANYL CITRATE 25 MCG: 50 INJECTION INTRAMUSCULAR; INTRAVENOUS at 12:49

## 2025-04-02 RX ADMIN — LIDOCAINE HYDROCHLORIDE 100 MG: 20 INJECTION, SOLUTION EPIDURAL; INFILTRATION; INTRACAUDAL; PERINEURAL at 07:35

## 2025-04-02 RX ADMIN — FENTANYL CITRATE 25 MCG: 50 INJECTION, SOLUTION INTRAMUSCULAR; INTRAVENOUS at 08:02

## 2025-04-02 RX ADMIN — HYDROMORPHONE HYDROCHLORIDE 0.5 MG: 1 INJECTION, SOLUTION INTRAMUSCULAR; INTRAVENOUS; SUBCUTANEOUS at 20:26

## 2025-04-02 RX ADMIN — Medication 80 MCG: at 09:05

## 2025-04-02 RX ADMIN — EPHEDRINE SULFATE 5 MG: 5 INJECTION INTRAVENOUS at 09:05

## 2025-04-02 RX ADMIN — PROPOFOL 30 MCG/KG/MIN: 10 INJECTION, EMULSION INTRAVENOUS at 07:38

## 2025-04-02 RX ADMIN — MIDAZOLAM HYDROCHLORIDE 2 MG: 1 INJECTION, SOLUTION INTRAMUSCULAR; INTRAVENOUS at 07:28

## 2025-04-02 RX ADMIN — WATER 2000 MG: 1 INJECTION INTRAMUSCULAR; INTRAVENOUS; SUBCUTANEOUS at 07:45

## 2025-04-02 RX ADMIN — ONDANSETRON 4 MG: 2 INJECTION, SOLUTION INTRAMUSCULAR; INTRAVENOUS at 10:55

## 2025-04-02 RX ADMIN — SODIUM CHLORIDE, SODIUM LACTATE, POTASSIUM CHLORIDE, AND CALCIUM CHLORIDE: .6; .31; .03; .02 INJECTION, SOLUTION INTRAVENOUS at 06:36

## 2025-04-02 RX ADMIN — HYDROMORPHONE HYDROCHLORIDE 0.2 MG: 1 INJECTION, SOLUTION INTRAMUSCULAR; INTRAVENOUS; SUBCUTANEOUS at 08:31

## 2025-04-02 RX ADMIN — DEXMEDETOMIDINE 4 MCG: 100 INJECTION, SOLUTION INTRAVENOUS at 08:07

## 2025-04-02 RX ADMIN — PROPOFOL 30 MG: 10 INJECTION, EMULSION INTRAVENOUS at 08:02

## 2025-04-02 RX ADMIN — SODIUM CHLORIDE, PRESERVATIVE FREE 10 ML: 5 INJECTION INTRAVENOUS at 20:29

## 2025-04-02 RX ADMIN — Medication 80 MCG: at 09:17

## 2025-04-02 RX ADMIN — LIDOCAINE HYDROCHLORIDE 20 MG: 20 INJECTION, SOLUTION EPIDURAL; INFILTRATION; INTRACAUDAL; PERINEURAL at 09:45

## 2025-04-02 RX ADMIN — HYDROMORPHONE HYDROCHLORIDE 0.5 MG: 1 INJECTION, SOLUTION INTRAMUSCULAR; INTRAVENOUS; SUBCUTANEOUS at 10:51

## 2025-04-02 RX ADMIN — HYDROMORPHONE HYDROCHLORIDE 0.5 MG: 1 INJECTION, SOLUTION INTRAMUSCULAR; INTRAVENOUS; SUBCUTANEOUS at 15:09

## 2025-04-02 RX ADMIN — DEXAMETHASONE SODIUM PHOSPHATE 4 MG: 4 INJECTION INTRA-ARTICULAR; INTRALESIONAL; INTRAMUSCULAR; INTRAVENOUS; SOFT TISSUE at 07:52

## 2025-04-02 RX ADMIN — PROPOFOL 200 MG: 10 INJECTION, EMULSION INTRAVENOUS at 07:35

## 2025-04-02 RX ADMIN — FENTANYL CITRATE 25 MCG: 50 INJECTION INTRAMUSCULAR; INTRAVENOUS at 12:54

## 2025-04-02 ASSESSMENT — PAIN DESCRIPTION - ORIENTATION
ORIENTATION: ANTERIOR

## 2025-04-02 ASSESSMENT — PAIN DESCRIPTION - DESCRIPTORS
DESCRIPTORS: ACHING;DISCOMFORT
DESCRIPTORS: ACHING
DESCRIPTORS: ACHING
DESCRIPTORS: ACHING;DISCOMFORT
DESCRIPTORS: ACHING;DISCOMFORT

## 2025-04-02 ASSESSMENT — PAIN DESCRIPTION - ONSET
ONSET: PROGRESSIVE
ONSET: PROGRESSIVE

## 2025-04-02 ASSESSMENT — PAIN SCALES - GENERAL
PAINLEVEL_OUTOF10: 2
PAINLEVEL_OUTOF10: 7
PAINLEVEL_OUTOF10: 5
PAINLEVEL_OUTOF10: 10
PAINLEVEL_OUTOF10: 7
PAINLEVEL_OUTOF10: 10
PAINLEVEL_OUTOF10: 0
PAINLEVEL_OUTOF10: 7
PAINLEVEL_OUTOF10: 10

## 2025-04-02 ASSESSMENT — PAIN DESCRIPTION - LOCATION
LOCATION: NECK

## 2025-04-02 ASSESSMENT — LIFESTYLE VARIABLES: SMOKING_STATUS: 1

## 2025-04-02 ASSESSMENT — PAIN DESCRIPTION - FREQUENCY
FREQUENCY: INTERMITTENT
FREQUENCY: INTERMITTENT

## 2025-04-02 ASSESSMENT — PAIN - FUNCTIONAL ASSESSMENT
PAIN_FUNCTIONAL_ASSESSMENT: ACTIVITIES ARE NOT PREVENTED
PAIN_FUNCTIONAL_ASSESSMENT: ACTIVITIES ARE NOT PREVENTED

## 2025-04-02 ASSESSMENT — PAIN DESCRIPTION - PAIN TYPE
TYPE: SURGICAL PAIN
TYPE: SURGICAL PAIN

## 2025-04-02 NOTE — FLOWSHEET NOTE
04/02/25 0911   Family Communication    Relationship to Patient Parent    Phone Number Britt, [phone]   Family/Significant Other Update Called   Delivery Origin Nurse   Message Disposition Family present - message delivered   Family Communication   Family Update Message Procedure started;Patient stable

## 2025-04-02 NOTE — PROGRESS NOTES
Spiritual Health History and Assessment/Progress Note  [facility]    Pre-Op,  ,  ,      Name: Barbie Dover MRN: [id number]    Age: 39 y.o.     Sex: female   Language: English   [employer]: None   Graves' disease     Date: 4/2/2025            Total Time Calculated: 6 min              Spiritual Assessment began in [facility]        Referral/Consult From: Rounding   Encounter Overview/Reason: Pre-Op  Service Provided For: Patient    Hiral, Belief, Meaning:   Patient identifies as spiritual, is connected with a hiral tradition or spiritual practice, and has beliefs or practices that help with coping during difficult times  Family/Friends No family/friends present      Importance and Influence:  Patient has spiritual/personal beliefs that influence decisions regarding their health  Family/Friends No family/friends present    Community:  Patient is connected with a spiritual community and feels well-supported. Support system includes: Parent/s  Family/Friends No family/friends present    Assessment and Plan of Care:     Patient Interventions include: Affirmed coping skills/support systems  Family/Friends Interventions include: No family/friends present    Patient Plan of Care: Spiritual Care available upon further referral  Family/Friends Plan of Care: Spiritual Care available upon further referral    Electronically signed by MICHAEL Holliday on 4/2/2025 at 7:42 AM     Rev. MAGDA Holliday, MICHAEL  [facility]   Paging Service [phone])

## 2025-04-02 NOTE — BRIEF OP NOTE
Brief Postoperative Note      Patient: Barbie Dover  YOB: 1985  MRN: [id number]    Date of Procedure: 4/2/2025    Pre-Op Diagnosis Codes:      * Graves' disease [E05.00]    Post-Op Diagnosis: Post-Op Diagnosis Codes:     * Graves' disease [E05.00]       Procedure(s):  TOTAL THYROIDECTOMY    Surgeon(s):  Miguel Angel Horowitz MD    Assistant:  Surgical Assistant: Marilee Ruggiero    Anesthesia: General    Estimated Blood Loss (mL): less than 100     Complications: None    Specimens:   ID Type Source Tests Collected by Time Destination   1 : Total thyroid, stitch right upper pole Tissue Thyroid SURGICAL PATHOLOGY Miguel Angel Horowitz MD 4/2/2025 0930        Implants:  * No implants in log *      Drains: * No LDAs found *    Findings:  Infection Present At Time Of Surgery (PATOS) (choose all levels that have infection present):  No infection present  Other Findings: hypervascular enlarged gland    Electronically signed by Miguel Angel Horowitz MD on 4/2/2025 at 9:58 AM

## 2025-04-02 NOTE — INTERVAL H&P NOTE
Update History & Physical    The patient's History and Physical of March 4, 2025 was reviewed with the patient and I examined the patient. There was no change. The surgical site was confirmed by the patient and me.     Plan: The risks, benefits, expected outcome, and alternative to the recommended procedure have been discussed with the patient. Patient understands and wants to proceed with the procedure.     Electronically signed by Miguel Angel Horowitz MD on 4/2/2025 at 7:22 AM

## 2025-04-02 NOTE — ANESTHESIA PRE PROCEDURE
Department of Anesthesiology  Preprocedure Note       Name:  Barbie Dover   Age:  39 y.o.  :  1985                                          MRN:  [id number]         Date:  2025      Surgeon: Surgeon(s):  Miguel Angel Horowitz MD    Procedure: Procedure(s):  TOTAL THYROIDECTOMY    Medications prior to admission:   Prior to Admission medications    Medication Sig Start Date End Date Taking? Authorizing Provider   lisinopril-hydroCHLOROthiazide (PRINZIDE;ZESTORETIC) 20-12.5 MG per tablet Take 1 tablet by mouth daily 24  Yes Dav Serrano MD   methIMAzole (TAPAZOLE) 10 MG tablet Take 0.5 tablets by mouth daily 25   Maura Hodge MD   levothyroxine (SYNTHROID) 150 MCG tablet Take 1 tablet by mouth daily  Patient not taking: Reported on 3/25/2025 10/24/24   Maura Hodge MD   metFORMIN (GLUCOPHAGE-XR) 500 MG extended release tablet TAKE 1 TABLET BY MOUTH ONCE DAILY (WITH DINNER)  DAYS  Patient taking differently: TAKE 1 TABLET BY MOUTH ONCE DAILY (WITH DINNER)  DAYS  PCOS 24   Maura Hodge MD   nicotine (NICODERM CQ) 21 MG/24HR Place 1 patch onto the skin daily 4/30/24 10/24/24  Maura Hodge MD   EPINEPHrine (EPIPEN 2-ESTEE) 0.3 MG/0.3ML SOAJ injection Inject 0.3 mg intramuscularly as needed for Severe allergic reaction 24   Dav Serrano MD       Current medications:    Current Facility-Administered Medications   Medication Dose Route Frequency Provider Last Rate Last Admin    lactated ringers infusion   IntraVENous Continuous Daniel Pressley  mL/hr at 25 0636 New Bag at 25 0636    ceFAZolin (ANCEF) 2,000 mg in sterile water 20 mL IV syringe  2,000 mg IntraVENous Once Miguel Angel Horowitz MD           Allergies:    Allergies   Allergen Reactions    Peanut (Diagnostic) Anaphylaxis       Problem List:    Patient Active Problem List   Diagnosis Code    Graves disease E05.00    Essential hypertension I10    Obesity E66.9    Tobacco abuse

## 2025-04-02 NOTE — DISCHARGE INSTRUCTIONS
Discharge Instructions:  Thyroidectomy    Dr. Horowitz    Call for appointment for follow up in 1 week [phone]    Activity:    Walk regularly.  You may resume driving in 24 hours unless still requiring narcotics for pain.      Work:    You may return to work in 5 - 7 days to light activity. No lifting more than 10 pounds for one week.    Diet:    You may resume normal diet after 24 hours.  Anesthesia and narcotics may cause nausea and vomiting.  If persistent please call the office.    Call if you have numbness or tingling around lips or fingertips as this is a sign of low calcium.    Wound Care:    You have a special dressing called Dermabond.  It is okay to shower and let the water run over the incision but do not scrub the area or soak in a tub.  If you have a small amount of drainage you may place a dry bandage over the wound and change it daily.  If you experience a lot of drainage, develop redness around the wound, or a fever over 101 F occurs please call the office.      Medications:    Resume home medications as indicated on the Medical Reconciliation form.     Aspirin, Coumadin, and Plavix can be restarted on post operative day 2 if you were taking them preoperatively.      Pain medications:  Non steroidal antiinflammatories seem to work best for post surgical pain.  Try these first as prescribed.  A narcotic prescription will also be given for breakthrough pain.    Over the counter stool softeners and laxatives may be used if needed.    Do not hesitate to call with questions or concerns.

## 2025-04-03 VITALS
BODY MASS INDEX: 33.91 KG/M2 | HEART RATE: 94 BPM | SYSTOLIC BLOOD PRESSURE: 126 MMHG | OXYGEN SATURATION: 98 % | RESPIRATION RATE: 16 BRPM | TEMPERATURE: 98.8 F | DIASTOLIC BLOOD PRESSURE: 89 MMHG | WEIGHT: 211 LBS | HEIGHT: 66 IN

## 2025-04-03 LAB
ANION GAP SERPL CALC-SCNC: 7 MMOL/L (ref 2–12)
BUN SERPL-MCNC: 6 MG/DL (ref 6–20)
BUN/CREAT SERPL: 10 (ref 12–20)
CALCIUM SERPL-MCNC: 8 MG/DL (ref 8.5–10.1)
CALCIUM SERPL-MCNC: 8.4 MG/DL (ref 8.5–10.1)
CHLORIDE SERPL-SCNC: 103 MMOL/L (ref 97–108)
CO2 SERPL-SCNC: 26 MMOL/L (ref 21–32)
CREAT SERPL-MCNC: 0.63 MG/DL (ref 0.55–1.02)
GLUCOSE SERPL-MCNC: 95 MG/DL (ref 65–100)
POTASSIUM SERPL-SCNC: 3 MMOL/L (ref 3.5–5.1)
SODIUM SERPL-SCNC: 136 MMOL/L (ref 136–145)

## 2025-04-03 PROCEDURE — 36415 COLL VENOUS BLD VENIPUNCTURE: CPT

## 2025-04-03 PROCEDURE — 82310 ASSAY OF CALCIUM: CPT

## 2025-04-03 PROCEDURE — 6370000000 HC RX 637 (ALT 250 FOR IP): Performed by: SURGERY

## 2025-04-03 PROCEDURE — 80048 BASIC METABOLIC PNL TOTAL CA: CPT

## 2025-04-03 RX ORDER — CALCIUM CARBONATE 1000 MG/1
1000 TABLET, CHEWABLE ORAL 2 TIMES DAILY
Qty: 60 TABLET | Refills: 1 | Status: SHIPPED | OUTPATIENT
Start: 2025-04-03 | End: 2025-06-02

## 2025-04-03 RX ORDER — CALCITRIOL 0.25 UG/1
0.25 CAPSULE, LIQUID FILLED ORAL 2 TIMES DAILY
Qty: 60 CAPSULE | Refills: 1 | Status: SHIPPED | OUTPATIENT
Start: 2025-04-03 | End: 2025-06-02

## 2025-04-03 RX ORDER — CALCITRIOL 0.25 UG/1
0.25 CAPSULE, LIQUID FILLED ORAL DAILY
Status: DISCONTINUED | OUTPATIENT
Start: 2025-04-03 | End: 2025-04-03 | Stop reason: HOSPADM

## 2025-04-03 RX ORDER — POTASSIUM CHLORIDE 1500 MG/1
40 TABLET, EXTENDED RELEASE ORAL ONCE
Status: COMPLETED | OUTPATIENT
Start: 2025-04-03 | End: 2025-04-03

## 2025-04-03 RX ORDER — CALCIUM CARBONATE 500 MG/1
1000 TABLET, CHEWABLE ORAL 2 TIMES DAILY
Status: DISCONTINUED | OUTPATIENT
Start: 2025-04-03 | End: 2025-04-03 | Stop reason: HOSPADM

## 2025-04-03 RX ADMIN — HYDROCHLOROTHIAZIDE 12.5 MG: 25 TABLET ORAL at 10:10

## 2025-04-03 RX ADMIN — POTASSIUM CHLORIDE 40 MEQ: 1500 TABLET, EXTENDED RELEASE ORAL at 10:10

## 2025-04-03 RX ADMIN — OXYCODONE 5 MG: 5 TABLET ORAL at 06:08

## 2025-04-03 RX ADMIN — CALCITRIOL CAPSULES 0.25 MCG 0.25 MCG: 0.25 CAPSULE ORAL at 06:09

## 2025-04-03 RX ADMIN — LISINOPRIL 20 MG: 20 TABLET ORAL at 10:10

## 2025-04-03 RX ADMIN — CALCIUM CARBONATE (ANTACID) CHEW TAB 500 MG 1000 MG: 500 CHEW TAB at 06:09

## 2025-04-03 RX ADMIN — OXYCODONE 5 MG: 5 TABLET ORAL at 13:12

## 2025-04-03 ASSESSMENT — PAIN SCALES - GENERAL
PAINLEVEL_OUTOF10: 2
PAINLEVEL_OUTOF10: 8

## 2025-04-03 ASSESSMENT — PAIN DESCRIPTION - DESCRIPTORS: DESCRIPTORS: ACHING

## 2025-04-03 ASSESSMENT — PAIN DESCRIPTION - LOCATION: LOCATION: NECK

## 2025-04-03 ASSESSMENT — PAIN DESCRIPTION - ORIENTATION: ORIENTATION: ANTERIOR

## 2025-04-03 NOTE — OP NOTE
[facility]              [address] [place]  [address]                            OPERATIVE REPORT      PATIENT NAME: JOSE MACHUCA             : 1985  MED REC NO: [id number]                       ROOM: [id number]  ACCOUNT NO: [id number]                       ADMIT DATE: 2025  PROVIDER: Miguel Angel Zendejas MD    DATE OF SERVICE:  2025    PREOPERATIVE DIAGNOSES:  Symptomatic Graves hyperthyroidism.    POSTOPERATIVE DIAGNOSES:  Symptomatic Graves hyperthyroidism.    PROCEDURES PERFORMED:  Total thyroidectomy.    SURGEON:  Miguel Angel Zendejas MD    ASSISTANT:  Marilee Ruggiero SA    ANESTHESIA:  General.    ESTIMATED BLOOD LOSS:  100 mL.    SPECIMENS REMOVED:  Total thyroid stitch at right upper pole.    INTRAOPERATIVE FINDINGS:  Infections:  No infection at the time of surgery.    Other Findings:  A very hypervascular gland as well as a pyramidal lobe going superiorly.     COMPLICATIONS:  None.    IMPLANTS:  None.    INDICATIONS:  Brief History:  The patient is a 39-year-old with refractory Graves disease.  Options were discussed and she has elected to undergo thyroidectomy.  She understands the risks and benefits and wished to proceed, and now presents for that.    DESCRIPTION OF PROCEDURE:  The patient was taken to the operating room, placed on the operating room table in the supine position, and underwent general anesthesia.  A roll was placed underneath the shoulders.  Neck was placed in mild hyperextension.  A neural integrity monitor tube was inserted into intubation.  The leads were placed on the chest wall.  The neck was then prepped and draped in usual sterile fashion.  After appropriate time-out and antibiotics were given, 0.5% Marcaine with epinephrine was infiltrated to the skin and subcutaneous tissue.  In the lower portion of the neck, a 5 cm incision was made along the Madeline lines and electrocautery was used to go through the  MD Dav Serrano MD

## 2025-04-03 NOTE — PROGRESS NOTES
DISCHARGE NOTE FROM SURGICAL-TELEMETRY NURSE    Patient determined to be stable for discharge by attending provider. I have reviewed the discharge instructions and follow-up appointments with the Patient. They verbalized understanding and all questions were answered to their satisfaction. No complaints or further questions were expressed.      Medications sent to pharmacy Appropriate educational materials and medication side effect teaching were provided.      PIV were removed prior to discharge.     Patient did not discharge with any line, jimenez, or drain.    All personal items collected during admission were returned to the patient prior to discharge.    Post-op patient: Yes - Patient given post-op discharge kit and instructed on use.      Myah Watson RN

## 2025-04-03 NOTE — PROGRESS NOTES
End of Shift Note    Bedside shift change report given to Myah RODGERS (oncoming nurse) by Veronica Bonilla LPN (offgoing nurse).  Report included the following information SBAR, Intake/Output, MAR, Accordion, Recent Results, and Med Rec Status    Shift worked:  7p-7a     Shift summary and any significant changes:    Pt c/o pain x2, See MAR for given medications. Labs completed. No complaints of numbness or tingling. Pt voided x2. Swelling noted on neck incision site and ice placed on incision. Pt was coughed up 10 ml of bloody mucousy sputum, MD aware ( Sample at bedside.) No respiratory distress, SOB or difficulty swallowing. Calcium was 8.0, given tums and calcitriol.      Concerns for physician to address:  none     Zone phone for oncoming shift:   [phone]       Activity:  Level of Assistance: Independent  Number times ambulated in hallways past shift: 2  Number of times OOB to chair past shift: 0    Cardiac:   Cardiac Monitoring: No      Cardiac Rhythm: Sinus rhythm    Access:  Current line(s): PIV     Genitourinary:   Urinary Status: Voiding    Respiratory:   O2 Device: None (Room air)  Chronic home O2 use?: NO  Incentive spirometer at bedside: YES    GI:  Last BM (including prior to admit): 04/01/25  Current diet:  ADULT DIET; Regular; 3 carb choices (45 gm/meal)  Passing flatus: YES    Pain Management:   Patient states pain is manageable on current regimen: YES    Skin:  Abhijit Scale Score: 21  Interventions: Wound Offloading (Prevention Methods): Repositioning    Patient Safety:  Fall Risk: Nursing Judgement-Fall Risk High(Add Comments): No  Fall Risk Interventions  Nursing Judgement-Fall Risk High(Add Comments): No  Toilet Every 2 Hours-In Advance of Need: Yes  Hourly Visual Checks: Awake, In bed  Fall Visual Posted: Socks  Room Door Open: Deferred to promote rest  Alarm On: Other (Comment) (patient refused bed alarm, education provided. Pt up ab thelma and steady on feet.)  Patient Moved Closer to Nursing Station:

## 2025-04-03 NOTE — ANESTHESIA POSTPROCEDURE EVALUATION
Department of Anesthesiology  Postprocedure Note    Patient: Barbie Dover  MRN: [id number]  YOB: 1985  Date of evaluation: 4/3/2025    Procedure Summary       Date: 04/02/25 [facility] MAIN OR    Anesthesia Start: 0730 Anesthesia Stop: 0959    Procedure: TOTAL THYROIDECTOMY (Neck) Diagnosis:       Graves' disease      (Graves' disease [E05.00])    Providers: Miguel Angel Horowitz MD Responsible Provider: Gerson Enrique MD    Anesthesia Type: General ASA Status: 2            Anesthesia Type: General    Andrew Phase I: Andrew Score: 10    Andrew Phase II:      Anesthesia Post Evaluation    Patient location during evaluation: PACU  Patient participation: complete - patient participated  Level of consciousness: awake and alert  Airway patency: patent  Nausea & Vomiting: no nausea  Cardiovascular status: hemodynamically stable  Respiratory status: acceptable  Hydration status: euvolemic  Multimodal analgesia pain management approach  Pain management: adequate        No notable events documented.

## 2025-04-03 NOTE — PROGRESS NOTES
Admit Date: 2025    POD 1 Day Post-Op    Procedure:  Procedure(s):  TOTAL THYROIDECTOMY      Assessment:   Principal Problem:    Graves' disease  Active Problems:    Graves disease  Resolved Problems:    * No resolved hospital problems. *    Feels ok but sore  No paresthesias  Calcium down to 8  hypokalemia      Plan/Recommendations/Medical Decision Makin.  Replete hypokalemia  2.  Start calcitriol and calcium  3.  Recheck calcium late morning  4.  Reg diet  5.  Home later pending calcium    Subjective:     Patient has complaints of pain.     Objective:     Blood pressure (!) 131/93, pulse 92, temperature 99 °F (37.2 °C), resp. rate 16, height 1.676 m (5' 6\"), weight 95.7 kg (211 lb), last menstrual period 2025, SpO2 99%.    Temp (24hrs), Av.9 °F (36.6 °C), Min:97 °F (36.1 °C), Max:99 °F (37.2 °C)      Physical Exam:  Neck: no adenopathy, no carotid bruit, no JVD, supple, symmetrical, trachea midline, and incision CDI, mild neck swelling, no dysphonia, Cvostek negative    Labs:   Recent Results (from the past 48 hours)   Hepatic Function Panel    Collection Time: 25 12:53 PM   Result Value Ref Range    Total Protein 7.0 6.0 - 8.5 g/dL    Albumin 4.2 3.9 - 4.9 g/dL    Total Bilirubin 0.4 0.0 - 1.2 mg/dL    Bilirubin, Direct 0.15 0.00 - 0.40 mg/dL    Alkaline Phosphatase 62 44 - 121 IU/L    AST 16 0 - 40 IU/L    ALT 10 0 - 32 IU/L   Thyroid Stimulating Immunoglobulin    Collection Time: 25 12:53 PM   Result Value Ref Range    TSI 1.17 (H) 0.00 - 0.55 IU/L   T3    Collection Time: 25 12:53 PM   Result Value Ref Range    T3, Total 103 71 - 180 ng/dL   T4, Free    Collection Time: 25 12:53 PM   Result Value Ref Range    T4 Free 0.45 (L) 0.82 - 1.77 ng/dL   TSH    Collection Time: 25 12:53 PM   Result Value Ref Range    TSH 4.670 (H) 0.450 - 4.500 uIU/mL   CBC with Auto Differential    Collection Time: 25 12:53 PM   Result Value Ref Range    WBC 6.4 3.4 - 10.8  x10E3/uL    RBC 4.69 3.77 - 5.28 x10E6/uL    Hemoglobin 13.4 11.1 - 15.9 g/dL    Hematocrit 40.4 34.0 - 46.6 %    MCV 86 79 - 97 fL    MCH 28.6 26.6 - 33.0 pg    MCHC 33.2 31.5 - 35.7 g/dL    RDW 13.7 11.7 - 15.4 %    Platelets 201 150 - 450 x10E3/uL    Neutrophils % 53 Not Estab. %    Lymphocytes % 35 Not Estab. %    Monocytes % 10 Not Estab. %    Eosinophils % 1 Not Estab. %    Basophils % 1 Not Estab. %    Neutrophils Absolute 3.4 1.4 - 7.0 x10E3/uL    Lymphocytes Absolute 2.3 0.7 - 3.1 x10E3/uL    Monocytes Absolute 0.6 0.1 - 0.9 x10E3/uL    Eosinophils Absolute 0.1 0.0 - 0.4 x10E3/uL    Basophils Absolute 0.0 0.0 - 0.2 x10E3/uL    Immature Granulocytes % 0 Not Estab. %    Immature Grans (Abs) 0.0 0.0 - 0.1 x10E3/uL   POC Pregnancy Urine Qual    Collection Time: 04/02/25  6:36 AM   Result Value Ref Range    Preg Test, Ur Negative NEG     Calcium    Collection Time: 04/02/25 10:09 AM   Result Value Ref Range    Calcium 8.6 8.5 - 10.1 MG/DL   Calcium    Collection Time: 04/02/25  4:42 PM   Result Value Ref Range    Calcium 8.6 8.5 - 10.1 MG/DL   Calcium    Collection Time: 04/02/25  9:56 PM   Result Value Ref Range    Calcium 8.2 (L) 8.5 - 10.1 MG/DL   Basic Metabolic Panel    Collection Time: 04/03/25  4:00 AM   Result Value Ref Range    Sodium 136 136 - 145 mmol/L    Potassium 3.0 (L) 3.5 - 5.1 mmol/L    Chloride 103 97 - 108 mmol/L    CO2 26 21 - 32 mmol/L    Anion Gap 7 2 - 12 mmol/L    Glucose 95 65 - 100 mg/dL    BUN 6 6 - 20 MG/DL    Creatinine 0.63 0.55 - 1.02 MG/DL    BUN/Creatinine Ratio 10 (L) 12 - 20      Est, Glom Filt Rate >90 >60 ml/min/1.73m2    Calcium 8.0 (L) 8.5 - 10.1 MG/DL       Data Review images and reports reviewed

## 2025-04-03 NOTE — PROGRESS NOTES
End of Shift Note    Bedside shift change report given to Veronica NUNN (oncoming nurse) by Armida Rivera RN (offgoing nurse).  Report included the following information SBAR, Kardex, Intake/Output, and MAR    Shift worked:  2P-7P     Shift summary and any significant changes:    Pt admitted to unit. Patient received IV dilaudid for pain. Patient voided twice. Ice pack applied to neck for extra pain relief. Patient standby assist.     Patient refused oral medications due to throat pain     Concerns for physician to address:      Zone phone for oncoming shift:         Active Consults:  None    Length of Stay:  Expected LOS:   Actual LOS: 0      Armida Rivera RN

## 2025-04-16 ENCOUNTER — OFFICE VISIT (OUTPATIENT)
Age: 40
End: 2025-04-16

## 2025-04-16 VITALS
DIASTOLIC BLOOD PRESSURE: 81 MMHG | HEIGHT: 66 IN | RESPIRATION RATE: 16 BRPM | TEMPERATURE: 98.2 F | HEART RATE: 80 BPM | WEIGHT: 209.8 LBS | SYSTOLIC BLOOD PRESSURE: 116 MMHG | OXYGEN SATURATION: 98 % | BODY MASS INDEX: 33.72 KG/M2

## 2025-04-16 DIAGNOSIS — E89.0 POST-SURGICAL HYPOTHYROIDISM: Primary | ICD-10-CM

## 2025-04-16 PROCEDURE — 99024 POSTOP FOLLOW-UP VISIT: CPT | Performed by: SURGERY

## 2025-04-16 ASSESSMENT — PATIENT HEALTH QUESTIONNAIRE - PHQ9
1. LITTLE INTEREST OR PLEASURE IN DOING THINGS: NOT AT ALL
SUM OF ALL RESPONSES TO PHQ QUESTIONS 1-9: 0
SUM OF ALL RESPONSES TO PHQ QUESTIONS 1-9: 0
2. FEELING DOWN, DEPRESSED OR HOPELESS: NOT AT ALL
SUM OF ALL RESPONSES TO PHQ QUESTIONS 1-9: 0
SUM OF ALL RESPONSES TO PHQ QUESTIONS 1-9: 0

## 2025-04-16 NOTE — PROGRESS NOTES
Surgery  Follow up    Procedure: total thyroidectomy  OR date:  4/2/2025  Path:    Total thyroid, thyroidectomy:        Compatible with Graves' disease.   Negative for malignancy.     S I feel fine, no paresthesias,  reports some voice fatigue and slight raspiness but otherwise feels well.  Also went back to work yesterday and developed some neck swelling    /81 (BP Site: Left Upper Arm, Patient Position: Sitting, BP Cuff Size: Small Adult)   Pulse 80   Temp 98.2 °F (36.8 °C) (Temporal)   Resp 16   Ht 1.676 m (5' 6\")   Wt 95.2 kg (209 lb 12.8 oz)   LMP 03/11/2025 (Approximate)   SpO2 98%   BMI 33.86 kg/m²     O Incisions healing well without infection   Medium sized swelling but soft and no ecchymosis   Mild dysphonia   Cvostek negative    A/P Doing well   Tolerating levothyroxine   Taking calcitriol daily and TUMS BID (not around time of levothyroxine)   RTW already   Check TSH, Free T4, calcium and intactPTH in 5 weeks   RTC 6 weeks     Miguel Angel Horowitz MD FACS

## 2025-04-16 NOTE — PROGRESS NOTES
Identified pt with two pt identifiers (name and ). Reviewed chart in preparation for visit and have obtained necessary documentation.    Barbie Dover is a 39 y.o. female  Chief Complaint   Patient presents with    Follow-up     S/P 2025 total thyroidectomy, neck swelling     /81 (BP Site: Left Upper Arm, Patient Position: Sitting, BP Cuff Size: Small Adult)   Pulse 80   Temp 98.2 °F (36.8 °C) (Temporal)   Resp 16   Ht 1.676 m (5' 6\")   Wt 95.2 kg (209 lb 12.8 oz)   LMP 2025 (Approximate)   SpO2 98%   BMI 33.86 kg/m²     1. Have you been to the ER, urgent care clinic since your last visit?  Hospitalized since your last visit?no    2. Have you seen or consulted any other health care providers outside of the [facility] since your last visit?  Include any pap smears or colon screening. no

## 2025-05-31 LAB
CALCIUM SERPL-MCNC: 8.7 MG/DL (ref 8.7–10.2)
PTH-INTACT SERPL-MCNC: 16 PG/ML (ref 15–65)
T4 FREE SERPL-MCNC: 1.54 NG/DL (ref 0.82–1.77)
TSH SERPL DL<=0.005 MIU/L-ACNC: 0.47 UIU/ML (ref 0.45–4.5)

## 2025-06-02 ENCOUNTER — OFFICE VISIT (OUTPATIENT)
Age: 40
End: 2025-06-02

## 2025-06-02 VITALS
SYSTOLIC BLOOD PRESSURE: 131 MMHG | RESPIRATION RATE: 18 BRPM | HEIGHT: 66 IN | BODY MASS INDEX: 33.97 KG/M2 | DIASTOLIC BLOOD PRESSURE: 88 MMHG | WEIGHT: 211.4 LBS | OXYGEN SATURATION: 97 % | HEART RATE: 79 BPM

## 2025-06-02 DIAGNOSIS — Z48.89 ENCOUNTER FOR POSTOPERATIVE CARE: Primary | ICD-10-CM

## 2025-06-02 PROCEDURE — 99024 POSTOP FOLLOW-UP VISIT: CPT | Performed by: SURGERY

## 2025-06-02 ASSESSMENT — PATIENT HEALTH QUESTIONNAIRE - PHQ9
SUM OF ALL RESPONSES TO PHQ QUESTIONS 1-9: 0
1. LITTLE INTEREST OR PLEASURE IN DOING THINGS: NOT AT ALL
2. FEELING DOWN, DEPRESSED OR HOPELESS: NOT AT ALL
SUM OF ALL RESPONSES TO PHQ QUESTIONS 1-9: 0

## 2025-06-02 NOTE — PROGRESS NOTES
Identified pt with two pt identifiers (name and ). Reviewed chart in preparation for visit and have obtained necessary documentation.    Barbie Dover is a 39 y.o. female Post-Op Check ( po 6wk total thyroidectomy )  .    Vitals:    25 1041   BP: 131/88   BP Site: Left Upper Arm   Patient Position: Sitting   BP Cuff Size: Large Adult   Pulse: 79   Resp: 18   SpO2: 97%   Weight: 95.9 kg (211 lb 6.4 oz)   Height: 1.676 m (5' 5.98\")          1. Have you been to the ER, urgent care clinic since your last visit?  Hospitalized since your last visit?  no     2. Have you seen or consulted any other health care providers outside of the [facility] since your last visit?  Include any pap smears or colon screening.  no

## 2025-06-02 NOTE — PROGRESS NOTES
Surgery  Follow up    Procedure: total thyroidectomy  OR date:  4/2/2025  Path:    Total thyroid, thyroidectomy:        Compatible with Graves' disease.   Negative for malignancy.     S I feel fine, no paresthesias,  voice improving.  Mild jitteriness but overall feels much better    /88 (BP Site: Left Upper Arm, Patient Position: Sitting, BP Cuff Size: Large Adult)   Pulse 79   Resp 18   Ht 1.676 m (5' 5.98\")   Wt 95.9 kg (211 lb 6.4 oz)   LMP 05/15/2025 (Exact Date)   SpO2 97%   BMI 34.14 kg/m²     O Incisions healing well without infection but early keloid   Medium sized swelling but soft and no ecchymosis   Mild dysphonia   Cvostek negative     Latest Reference Range & Units 05/30/25 15:35   Calcium 8.7 - 10.2 mg/dL 8.7   Parathyroid Hormone 15 - 65 pg/mL 16   TSH, 3rd Generation 0.450 - 4.500 uIU/mL 0.473   T4 Free 0.82 - 1.77 ng/dL 1.54     A/P Doing well   Tolerating levothyroxine   Reduce calcitriol to 0.25 mcg per day for two weeks and then stop   Continue calcium supplements for another 4 weeks (do not take around levothyroxine   RTW already   Defer reduction in levothyroxine to Maura Hodge MD    RTC prn    Miguel Angel Horowitz MD FACS

## 2025-06-04 RX ORDER — NICOTINE 21 MG/24HR
PATCH, TRANSDERMAL 24 HOURS TRANSDERMAL
Qty: 42 PATCH | Refills: 9 | OUTPATIENT
Start: 2025-06-04

## 2025-06-15 DIAGNOSIS — I10 PRIMARY HYPERTENSION: ICD-10-CM

## 2025-06-17 RX ORDER — LISINOPRIL AND HYDROCHLOROTHIAZIDE 12.5; 2 MG/1; MG/1
1 TABLET ORAL DAILY
Qty: 90 TABLET | Refills: 0 | OUTPATIENT
Start: 2025-06-17

## 2025-06-25 RX ORDER — METFORMIN HYDROCHLORIDE 500 MG/1
TABLET, EXTENDED RELEASE ORAL
Qty: 90 TABLET | Refills: 0 | Status: SHIPPED | OUTPATIENT
Start: 2025-06-25

## 2025-07-08 ENCOUNTER — TELEPHONE (OUTPATIENT)
Age: 40
End: 2025-07-08

## 2025-07-08 DIAGNOSIS — I10 PRIMARY HYPERTENSION: ICD-10-CM

## 2025-07-08 NOTE — TELEPHONE ENCOUNTER
Sent pt a [employer] message informing her that per Dr. Hodge, no labs are needed for her appt.

## 2025-07-08 NOTE — TELEPHONE ENCOUNTER
7/8/25 9:54 am     Pt called asking if they needed to get labs done for F/U appt tomorrow 7/8 they are currently at [facility] and they have no order     Pt can be reached at [phone]

## 2025-07-09 ENCOUNTER — OFFICE VISIT (OUTPATIENT)
Age: 40
End: 2025-07-09
Payer: MEDICAID

## 2025-07-09 VITALS
HEART RATE: 91 BPM | DIASTOLIC BLOOD PRESSURE: 84 MMHG | HEIGHT: 66 IN | OXYGEN SATURATION: 99 % | BODY MASS INDEX: 33.91 KG/M2 | RESPIRATION RATE: 16 BRPM | WEIGHT: 211 LBS | SYSTOLIC BLOOD PRESSURE: 136 MMHG

## 2025-07-09 DIAGNOSIS — E05.00 THYROTOXICOSIS WITH DIFFUSE GOITER WITHOUT THYROTOXIC CRISIS OR STORM: Primary | ICD-10-CM

## 2025-07-09 PROCEDURE — 99214 OFFICE O/P EST MOD 30 MIN: CPT | Performed by: INTERNAL MEDICINE

## 2025-07-09 PROCEDURE — G2211 COMPLEX E/M VISIT ADD ON: HCPCS | Performed by: INTERNAL MEDICINE

## 2025-07-09 PROCEDURE — 3075F SYST BP GE 130 - 139MM HG: CPT | Performed by: INTERNAL MEDICINE

## 2025-07-09 PROCEDURE — 3079F DIAST BP 80-89 MM HG: CPT | Performed by: INTERNAL MEDICINE

## 2025-07-09 RX ORDER — LISINOPRIL AND HYDROCHLOROTHIAZIDE 12.5; 2 MG/1; MG/1
1 TABLET ORAL DAILY
Qty: 60 TABLET | Refills: 0 | Status: SHIPPED | OUTPATIENT
Start: 2025-07-09

## 2025-07-09 RX ORDER — LEVOTHYROXINE SODIUM 150 UG/1
150 TABLET ORAL DAILY
Qty: 90 TABLET | Refills: 3 | Status: SHIPPED | OUTPATIENT
Start: 2025-07-09

## 2025-07-09 RX ORDER — NICOTINE 21 MG/24HR
1 PATCH, TRANSDERMAL 24 HOURS TRANSDERMAL DAILY
Qty: 30 PATCH | Refills: 11 | Status: SHIPPED | OUTPATIENT
Start: 2025-07-09 | End: 2025-08-20

## 2025-07-09 NOTE — PROGRESS NOTES
Chief Complaint   Patient presents with    Follow-up        History of Present Illness: Barbie Dover is a  39 y.o.female with a past medical hx significant for HTN, headaches presenting in referral  from Dav Serrano MD for Graves'.      Went to have a physical in December who noticed she had a large thyroid. Reports that she's never noticed thyroid enlargement until December. Works as schoolbus . Notes tremor, has had that for a few months. Thought she was just nervous. Has gained  40 lbs- since September- before this started was 145, now 185 - is 6\"6. Just started taking biotin 1 week ago- is taking 1 2500mcg of biotin. Was taking B and C complex vitamins- b12. No iodine. No CT scans with contrast. Does endorse some pain when turns  to the left, numbness in the R cheek. Chokes on liquids.      Is interested in pursuing fertility. Going back to school on Feb 22nd- end of March.  Experienced an ectopic pregnancy 12 years ago and has been trying to conceive for the past 5 years without success.  Has seen fertility specialists regarding this.   Menstrual periods are very irregular.  Is actively pursuing fertility at this time.      03/17/2021: Has been taking 4 a day- has been doing that since I sent in rx. Doesn't hurt when she moves her hand, still has a little shake.  It is not as bad. Still forgets everything. PCP recently started lisinopril/hctz- was not told number. 153/101 in the office- does not check at home.      04/22/2021: Switched over to PTU 04/14, repeat labs are due May 3rd. Boyfriend has semena nalysis pending next week.       06/28/2021:Hasn't been taking med 3x daily- got back on track last week. Forgot to email me back. Went back to repro when she was on a cycle- waiting of boyfriend to do the semen analysis, labs.       07/27/2022: Still interested in pursuing fertility, previous relationship did end.  Missing the evening dose of PTU frequently.  Not taking  a prenatal

## 2025-07-09 NOTE — PROGRESS NOTES
Chief Complaint   Patient presents with    Follow-up     /84   Pulse 91   Resp 16   Ht 1.676 m (5' 5.98\")   Wt 95.7 kg (211 lb)   SpO2 99%   BMI 34.08 kg/m²   1. Have you been to the ER, urgent care clinic since your last visit?  Hospitalized since your last visit?No    2. Have you seen or consulted any other health care providers outside of the [facility] since your last visit?  Include any pap smears or colon screening. No

## 2025-07-09 NOTE — TELEPHONE ENCOUNTER
Patient comment: I know I need to see him first but my appointment isn't until September and I'm out of refills now.     Last appointment: 4/24/24  Next appointment: 9/12/25  Previous refill encounter(s): 4/24/24    Requested Prescriptions     Pending Prescriptions Disp Refills    lisinopril-hydroCHLOROthiazide (PRINZIDE;ZESTORETIC) 20-12.5 MG per tablet 90 tablet 0     Sig: Take 1 tablet by mouth daily         For Pharmacy Admin Tracking Only    Program: Medication Refill  CPA in place:    Recommendation Provided To:   Intervention Detail: New Rx: 1, reason: Patient Preference  Intervention Accepted By:   Gap Closed?:    Time Spent (min): 5

## 2025-07-12 LAB
ALBUMIN SERPL-MCNC: 4.3 G/DL (ref 3.9–4.9)
CALCIUM SERPL-MCNC: 8.3 MG/DL (ref 8.7–10.2)
T4 FREE SERPL-MCNC: 1.92 NG/DL (ref 0.82–1.77)
TSH SERPL DL<=0.005 MIU/L-ACNC: 0.18 UIU/ML (ref 0.45–4.5)

## 2025-07-15 LAB
ALBUMIN SERPL-MCNC: 4.3 G/DL (ref 3.9–4.9)
CALCIUM SERPL-MCNC: 8.3 MG/DL (ref 8.7–10.2)
SPECIMEN STATUS REPORT: NORMAL
T4 FREE SERPL-MCNC: 1.92 NG/DL (ref 0.82–1.77)
TSH SERPL DL<=0.005 MIU/L-ACNC: 0.18 UIU/ML (ref 0.45–4.5)

## 2025-07-16 RX ORDER — NICOTINE 21 MG/24HR
PATCH, TRANSDERMAL 24 HOURS TRANSDERMAL
Qty: 42 PATCH | Refills: 9 | Status: SHIPPED | OUTPATIENT
Start: 2025-07-16

## 2025-07-18 ENCOUNTER — RESULTS FOLLOW-UP (OUTPATIENT)
Age: 40
End: 2025-07-18

## 2025-07-18 DIAGNOSIS — I10 ESSENTIAL (PRIMARY) HYPERTENSION: ICD-10-CM

## 2025-07-18 DIAGNOSIS — E05.00 THYROTOXICOSIS WITH DIFFUSE GOITER WITHOUT THYROTOXIC CRISIS OR STORM: Primary | ICD-10-CM

## 2025-07-18 RX ORDER — CALCITRIOL 0.5 UG/1
0.5 CAPSULE, LIQUID FILLED ORAL DAILY
Qty: 90 CAPSULE | Refills: 3 | Status: SHIPPED | OUTPATIENT
Start: 2025-07-18

## 2025-07-18 RX ORDER — LEVOTHYROXINE SODIUM 150 UG/1
150 TABLET ORAL DAILY
Qty: 90 TABLET | Refills: 3 | Status: SHIPPED
Start: 2025-07-18

## 2025-07-18 NOTE — TELEPHONE ENCOUNTER
See letter    Daria Renee it took so long for me to get back to you with these results- they took 6 days to come to my in basket- ANYWAY, here's what I want you taking:    Levothyroxine 150mcg ONLY SIX days per week - current dose of 7 days per week is too high  Calcium carbonate (extra strength tums with lunch OR dinner)- no change  Add calcitriol 0.50mg with the tums at lunch OR dinner    Repeat these labs a week before you see me back- they're ordered. LMK If you have questions!

## (undated) DEVICE — PROBE 8225101 5PK STD PRASS FL TIP ROHS

## (undated) DEVICE — SUTURE VICRYL SZ 4-0 L18IN ABSRB UD L13MM P-3 3/8 CIR PRIM J494H

## (undated) DEVICE — SUTURE PERMA-HAND SZ 2-0 L30IN NONABSORBABLE BLK L26MM SH K833H

## (undated) DEVICE — SUTURE VICRYL + SZ 3-0 L27IN ABSRB UD L26MM SH 1/2 CIR VCP416H

## (undated) DEVICE — SPONGE GZ W4XL4IN COT RADPQ HIGHLY ABSRB STERILE

## (undated) DEVICE — CLIP INT SM WIDE WECK RED TI TRNSVRS GRV CHEVRON SHP W/ PERCIS TIP 24 PER PK

## (undated) DEVICE — LIQUIBAND RAPID ADHESIVE 36/CS 0.8ML: Brand: MEDLINE

## (undated) DEVICE — AGENT HEMOSTATIC SURG ORIGINAL ABS 4X8IN LOOSE KNIT 12/CA

## (undated) DEVICE — BLADE ES ELASTOMERIC COAT INSUL DURABLE BEND UPTO 90DEG

## (undated) DEVICE — MAJOR LAPAROTOMY-MRMC: Brand: MEDLINE INDUSTRIES, INC.

## (undated) DEVICE — KIT,1200CC CANISTER,3/16"X6' TUBING: Brand: MEDLINE INDUSTRIES, INC.

## (undated) DEVICE — MAGNETIC INSTR DRAPE 20X16: Brand: MEDLINE INDUSTRIES, INC.

## (undated) DEVICE — HYPODERMIC SAFETY NEEDLE: Brand: MONOJECT

## (undated) DEVICE — SUTURE VICRYL SZ 2-0 L27IN ABSRB UD L26MM SH 1/2 CIR J417H

## (undated) DEVICE — SHEET,T,THYROID,STERILE: Brand: MEDLINE

## (undated) DEVICE — SOLUTION IRRIG 1000ML 09% SOD CHL USP PIC PLAS CONTAINER

## (undated) DEVICE — PENCIL SMK EVAC ALL IN 1 DSGN ENH VISIBILITY IMPROVED AIR

## (undated) DEVICE — GLOVE ORANGE PI 7 1/2   MSG9075

## (undated) DEVICE — SYRINGE MED 10ML LUERLOCK TIP W/O SFTY DISP

## (undated) DEVICE — SUTURE PERMAHAND SZ 2-0 L30IN NONABSORBABLE BLK SILK W/O A305H

## (undated) DEVICE — CLIP LIG M BLU TI HRT SHP WIRE HORZ 24 CLIPS/PK 25PK/CA

## (undated) DEVICE — APPLICATOR MEDICATED 10.5 CC SOLUTION HI LT ORNG CHLORAPREP

## (undated) DEVICE — GOWN,SIRUS,NONRNF,SETINSLV,2XL,18/CS: Brand: MEDLINE

## (undated) DEVICE — PAD,NON-ADHERENT,3X8,STERILE,LF,1/PK: Brand: MEDLINE